# Patient Record
Sex: MALE | Race: WHITE | NOT HISPANIC OR LATINO | Employment: OTHER | URBAN - METROPOLITAN AREA
[De-identification: names, ages, dates, MRNs, and addresses within clinical notes are randomized per-mention and may not be internally consistent; named-entity substitution may affect disease eponyms.]

---

## 2020-07-09 ENCOUNTER — APPOINTMENT (OUTPATIENT)
Dept: LAB | Facility: HOSPITAL | Age: 65
End: 2020-07-09
Attending: PHYSICIAN ASSISTANT
Payer: MEDICARE

## 2020-07-09 ENCOUNTER — HOSPITAL ENCOUNTER (OUTPATIENT)
Dept: RADIOLOGY | Facility: CLINIC | Age: 65
Discharge: HOME | End: 2020-07-09
Attending: PHYSICIAN ASSISTANT
Payer: MEDICARE

## 2020-07-09 ENCOUNTER — OFFICE VISIT (OUTPATIENT)
Dept: FAMILY MEDICINE | Facility: CLINIC | Age: 65
End: 2020-07-09
Payer: MEDICARE

## 2020-07-09 VITALS
HEIGHT: 70 IN | BODY MASS INDEX: 36.8 KG/M2 | TEMPERATURE: 98 F | HEART RATE: 68 BPM | WEIGHT: 257.06 LBS | DIASTOLIC BLOOD PRESSURE: 110 MMHG | SYSTOLIC BLOOD PRESSURE: 176 MMHG

## 2020-07-09 DIAGNOSIS — M79.2 NEURITIS OF UPPER EXTREMITY: ICD-10-CM

## 2020-07-09 DIAGNOSIS — F17.200 TOBACCO USE DISORDER: ICD-10-CM

## 2020-07-09 DIAGNOSIS — Z12.5 SCREENING FOR MALIGNANT NEOPLASM OF PROSTATE: ICD-10-CM

## 2020-07-09 DIAGNOSIS — Z11.59 ENCOUNTER FOR HEPATITIS C SCREENING TEST FOR LOW RISK PATIENT: ICD-10-CM

## 2020-07-09 DIAGNOSIS — J30.2 SEASONAL ALLERGIES: ICD-10-CM

## 2020-07-09 DIAGNOSIS — I10 ESSENTIAL HYPERTENSION: Primary | ICD-10-CM

## 2020-07-09 DIAGNOSIS — R30.0 DYSURIA: ICD-10-CM

## 2020-07-09 LAB
BASOPHILS # BLD AUTO: 0.09 K/UL (ref 0–0.2)
BASOPHILS NFR BLD: 1 % (ref 0–1.9)
BILIRUB SERPL-MCNC: ABNORMAL MG/DL
BLOOD URINE, POC: ABNORMAL
CLARITY, POC UA: ABNORMAL
COLOR, POC UA: ABNORMAL
DIFFERENTIAL METHOD: ABNORMAL
EOSINOPHIL # BLD AUTO: 0.4 K/UL (ref 0–0.5)
EOSINOPHIL NFR BLD: 4.2 % (ref 0–8)
ERYTHROCYTE [DISTWIDTH] IN BLOOD BY AUTOMATED COUNT: 13.7 % (ref 11.5–14.5)
GLUCOSE UR QL STRIP: NORMAL
HCT VFR BLD AUTO: 49.6 % (ref 40–54)
HGB BLD-MCNC: 15.8 G/DL (ref 14–18)
IMM GRANULOCYTES # BLD AUTO: 0.02 K/UL (ref 0–0.04)
IMM GRANULOCYTES NFR BLD AUTO: 0.2 % (ref 0–0.5)
KETONES UR QL STRIP: ABNORMAL
LEUKOCYTE ESTERASE URINE, POC: ABNORMAL
LYMPHOCYTES # BLD AUTO: 3.2 K/UL (ref 1–4.8)
LYMPHOCYTES NFR BLD: 35.3 % (ref 18–48)
MCH RBC QN AUTO: 31.2 PG (ref 27–31)
MCHC RBC AUTO-ENTMCNC: 31.9 G/DL (ref 32–36)
MCV RBC AUTO: 98 FL (ref 82–98)
MONOCYTES # BLD AUTO: 0.8 K/UL (ref 0.3–1)
MONOCYTES NFR BLD: 9.4 % (ref 4–15)
NEUTROPHILS # BLD AUTO: 4.5 K/UL (ref 1.8–7.7)
NEUTROPHILS NFR BLD: 49.9 % (ref 38–73)
NITRITE, POC UA: ABNORMAL
NRBC BLD-RTO: 0 /100 WBC
PH, POC UA: 6
PLATELET # BLD AUTO: 210 K/UL (ref 150–350)
PMV BLD AUTO: 11.8 FL (ref 9.2–12.9)
PROTEIN, POC: ABNORMAL
RBC # BLD AUTO: 5.06 M/UL (ref 4.6–6.2)
SPECIFIC GRAVITY, POC UA: 1.01
UROBILINOGEN, POC UA: 1
WBC # BLD AUTO: 8.97 K/UL (ref 3.9–12.7)

## 2020-07-09 PROCEDURE — 36415 PR COLLECTION VENOUS BLOOD,VENIPUNCTURE: ICD-10-PCS | Mod: S$GLB,,, | Performed by: PHYSICIAN ASSISTANT

## 2020-07-09 PROCEDURE — 36415 COLL VENOUS BLD VENIPUNCTURE: CPT | Mod: S$GLB,,, | Performed by: PHYSICIAN ASSISTANT

## 2020-07-09 PROCEDURE — 72050 X-RAY EXAM NECK SPINE 4/5VWS: CPT | Mod: S$GLB,,, | Performed by: RADIOLOGY

## 2020-07-09 PROCEDURE — 84443 ASSAY THYROID STIM HORMONE: CPT

## 2020-07-09 PROCEDURE — 73130 X-RAY EXAM OF HAND: CPT | Mod: LT,S$GLB,, | Performed by: RADIOLOGY

## 2020-07-09 PROCEDURE — 84439 ASSAY OF FREE THYROXINE: CPT

## 2020-07-09 PROCEDURE — 73130 X-RAY EXAM OF HAND: CPT | Mod: RT,S$GLB,, | Performed by: RADIOLOGY

## 2020-07-09 PROCEDURE — 80061 LIPID PANEL: CPT

## 2020-07-09 PROCEDURE — 99204 OFFICE O/P NEW MOD 45 MIN: CPT | Mod: 25,S$GLB,, | Performed by: PHYSICIAN ASSISTANT

## 2020-07-09 PROCEDURE — 99204 PR OFFICE/OUTPT VISIT, NEW, LEVL IV, 45-59 MIN: ICD-10-PCS | Mod: 25,S$GLB,, | Performed by: PHYSICIAN ASSISTANT

## 2020-07-09 PROCEDURE — 81002 POCT URINE DIPSTICK WITHOUT MICROSCOPE: ICD-10-PCS | Mod: S$GLB,,, | Performed by: PHYSICIAN ASSISTANT

## 2020-07-09 PROCEDURE — 84481 FREE ASSAY (FT-3): CPT

## 2020-07-09 PROCEDURE — 81002 URINALYSIS NONAUTO W/O SCOPE: CPT | Mod: S$GLB,,, | Performed by: PHYSICIAN ASSISTANT

## 2020-07-09 PROCEDURE — 80053 COMPREHEN METABOLIC PANEL: CPT

## 2020-07-09 PROCEDURE — 73130 XR HAND COMPLETE 3 VIEW LEFT: ICD-10-PCS | Mod: LT,S$GLB,, | Performed by: RADIOLOGY

## 2020-07-09 PROCEDURE — 72050 XR CERVICAL SPINE COMPLETE 5 VIEW: ICD-10-PCS | Mod: S$GLB,,, | Performed by: RADIOLOGY

## 2020-07-09 PROCEDURE — 86803 HEPATITIS C AB TEST: CPT

## 2020-07-09 PROCEDURE — 87086 URINE CULTURE/COLONY COUNT: CPT

## 2020-07-09 PROCEDURE — 84153 ASSAY OF PSA TOTAL: CPT

## 2020-07-09 PROCEDURE — 85025 COMPLETE CBC W/AUTO DIFF WBC: CPT

## 2020-07-09 RX ORDER — OLMESARTAN MEDOXOMIL 40 MG/1
40 TABLET ORAL DAILY
Qty: 90 TABLET | Refills: 3 | Status: SHIPPED | OUTPATIENT
Start: 2020-07-09 | End: 2020-09-03

## 2020-07-09 RX ORDER — LEVOCETIRIZINE DIHYDROCHLORIDE 5 MG/1
5 TABLET, FILM COATED ORAL NIGHTLY
Qty: 90 TABLET | Refills: 3 | Status: SHIPPED | OUTPATIENT
Start: 2020-07-09 | End: 2020-09-25

## 2020-07-09 RX ORDER — ASPIRIN 81 MG/1
81 TABLET ORAL DAILY
Status: ON HOLD | COMMUNITY
End: 2023-09-12 | Stop reason: HOSPADM

## 2020-07-09 NOTE — PROGRESS NOTES
Subjective:       Patient ID: Bossman Duke is a 64 y.o. male.    Chief Complaint: Establish Care    Patient is a 65 yo male coming in today to establish care. He has several issues to discuss.     Hypertension  This is a chronic problem. The current episode started more than 1 year ago. The problem has been waxing and waning since onset. The problem is uncontrolled (He has been off medication for over a year. ). Associated symptoms include neck pain. Pertinent negatives include no anxiety, blurred vision, chest pain, headaches, malaise/fatigue, orthopnea, palpitations, peripheral edema, PND, shortness of breath or sweats. There are no associated agents to hypertension. Risk factors for coronary artery disease include family history, obesity, male gender, sedentary lifestyle and smoking/tobacco exposure. Past treatments include ACE inhibitors. Compliance problems include exercise, psychosocial issues and diet.    Neurologic Problem  The patient's primary symptoms include focal sensory loss and focal weakness. Primary symptoms comment: complains of loss of sensation from his bilateral forearms to his hands. The current episode started more than 1 month ago. The neurological problem developed insidiously. The problem has been gradually worsening since onset. There was upper extremity focality noted. Associated symptoms include neck pain. Pertinent negatives include no abdominal pain, chest pain, fatigue, headaches, palpitations or shortness of breath. Treatments tried: neurontin  The treatment provided no relief. (Has a history of neck surgery)   Dysuria   This is a new problem. The current episode started in the past 7 days. The problem has been waxing and waning. Pertinent negatives include no behavior changes, chills, discharge, flank pain, sweats, bubble bath use or constipation. Associated symptoms comments: Complains of a strong urine smell. His past medical history is significant for hypertension. Has a history  of neck surgery   URI   This is a new problem. The current episode started more than 1 month ago. The problem has been waxing and waning. There has been no fever. Associated symptoms include congestion, dysuria, neck pain, sinus pain and sneezing. Pertinent negatives include no abdominal pain, chest pain or headaches. He has tried nothing for the symptoms.     Past Medical History:   Diagnosis Date    Hypertension     PUD (peptic ulcer disease)        Review of Systems   Constitutional: Negative for activity change, chills, fatigue and malaise/fatigue.   HENT: Positive for nasal congestion, postnasal drip, sinus pressure/congestion and sneezing.    Eyes: Negative for blurred vision.   Respiratory: Negative for chest tightness and shortness of breath.    Cardiovascular: Negative for chest pain, palpitations, orthopnea and PND.   Gastrointestinal: Negative for abdominal pain and constipation.   Genitourinary: Positive for dysuria. Negative for flank pain.   Musculoskeletal: Positive for neck pain.   Neurological: Positive for focal weakness. Negative for headaches.         Objective:      Physical Exam  Vitals signs and nursing note reviewed.   Constitutional:       General: He is not in acute distress.     Appearance: Normal appearance. He is obese. He is not ill-appearing, toxic-appearing or diaphoretic.   HENT:      Head: Normocephalic and atraumatic.      Right Ear: Tympanic membrane, ear canal and external ear normal. There is no impacted cerumen.      Left Ear: Tympanic membrane, ear canal and external ear normal. There is no impacted cerumen.      Nose: Congestion present. No rhinorrhea.      Mouth/Throat:      Mouth: Mucous membranes are moist.      Pharynx: No oropharyngeal exudate or posterior oropharyngeal erythema.   Neck:      Musculoskeletal: No muscular tenderness.   Cardiovascular:      Rate and Rhythm: Normal rate and regular rhythm.      Heart sounds: No murmur. No friction rub. No gallop.     Pulmonary:      Effort: Pulmonary effort is normal. No respiratory distress.      Breath sounds: No stridor. No wheezing, rhonchi or rales.   Chest:      Chest wall: No tenderness.   Abdominal:      General: There is no distension.      Palpations: There is no mass.      Tenderness: There is no abdominal tenderness. There is no right CVA tenderness, left CVA tenderness, guarding or rebound.      Hernia: No hernia is present.   Musculoskeletal:         General: No swelling or tenderness.      Right hand: Normal.      Left hand: Normal.   Lymphadenopathy:      Cervical: No cervical adenopathy.   Skin:     General: Skin is warm and dry.   Neurological:      Mental Status: He is alert.         Assessment:       1. Essential hypertension    2. Encounter for hepatitis C screening test for low risk patient    3. Neuritis of upper extremity    4. Screening for malignant neoplasm of prostate    5. Dysuria    6. Seasonal allergies    7. Tobacco use disorder        Plan:       Essential hypertension uncontrolled  -     olmesartan (BENICAR) 40 MG tablet; Take 1 tablet (40 mg total) by mouth once daily.  Dispense: 90 tablet; Refill: 3  -     Comprehensive metabolic panel; Future; Expected date: 07/09/2020  -     Lipid Panel; Future; Expected date: 07/09/2020  -     TSH; Future; Expected date: 07/09/2020  -     T3, free; Future; Expected date: 07/09/2020  -     T4, free; Future; Expected date: 07/09/2020  -     CBC auto differential; Future; Expected date: 07/09/2020  The patient is asked to make an attempt to improve diet and exercise patterns to aid in medical management of this problem.    Encounter for hepatitis C screening test for low risk patient  -     Hepatitis C Antibody; Future; Expected date: 07/09/2020    Neuritis of upper extremity  -     X-Ray Hand 3 view Left; Future; Expected date: 07/09/2020  -     X-Ray Hand 3 view Right; Future; Expected date: 07/09/2020  -     X-Ray Cervical Spine Complete 5 view; Future;  Expected date: 07/09/2020  -     EMG W/ ULTRASOUND AND NERVE CONDUCTION TEST 2 Extremities; Future    Screening for malignant neoplasm of prostate  -     PSA, Screening; Future; Expected date: 07/09/2020    Dysuria  -     Urine culture  -     POCT urine dipstick without microscope    Seasonal allergies  -     levocetirizine (XYZAL) 5 MG tablet; Take 1 tablet (5 mg total) by mouth every evening.  Dispense: 90 tablet; Refill: 3    Tobacco use disorder  Patient counciled on smoking sucessation.

## 2020-07-09 NOTE — PROGRESS NOTES
Venipuncture performed with 23 gauge butterfly, x's 1 attempt,  to R Antecubital vein.  Specimens collected per orders.      Pressure dressing applied to site, instructed patient to remove dressing in 10-15 minutes, OK to re-adjust dressing if pressure causing any discomfort, to observe closely for numbness and/or discoloration to hand or fingers, and to notify provider if bleeding persists after applying constant pressure lasting 30 minutes.

## 2020-07-10 ENCOUNTER — TELEPHONE (OUTPATIENT)
Dept: FAMILY MEDICINE | Facility: CLINIC | Age: 65
End: 2020-07-10

## 2020-07-10 LAB
ALBUMIN SERPL BCP-MCNC: 3.9 G/DL (ref 3.5–5.2)
ALP SERPL-CCNC: 91 U/L (ref 55–135)
ALT SERPL W/O P-5'-P-CCNC: 39 U/L (ref 10–44)
ANION GAP SERPL CALC-SCNC: 9 MMOL/L (ref 8–16)
AST SERPL-CCNC: 29 U/L (ref 10–40)
BACTERIA UR CULT: NO GROWTH
BILIRUB SERPL-MCNC: 0.7 MG/DL (ref 0.1–1)
BUN SERPL-MCNC: 12 MG/DL (ref 8–23)
CALCIUM SERPL-MCNC: 9.7 MG/DL (ref 8.7–10.5)
CHLORIDE SERPL-SCNC: 102 MMOL/L (ref 95–110)
CHOLEST SERPL-MCNC: 217 MG/DL (ref 120–199)
CHOLEST/HDLC SERPL: 3.8 {RATIO} (ref 2–5)
CO2 SERPL-SCNC: 28 MMOL/L (ref 23–29)
COMPLEXED PSA SERPL-MCNC: 2.1 NG/ML (ref 0–4)
CREAT SERPL-MCNC: 0.9 MG/DL (ref 0.5–1.4)
EST. GFR  (AFRICAN AMERICAN): >60 ML/MIN/1.73 M^2
EST. GFR  (NON AFRICAN AMERICAN): >60 ML/MIN/1.73 M^2
GLUCOSE SERPL-MCNC: 93 MG/DL (ref 70–110)
HCV AB SERPL QL IA: NEGATIVE
HDLC SERPL-MCNC: 57 MG/DL (ref 40–75)
HDLC SERPL: 26.3 % (ref 20–50)
LDLC SERPL CALC-MCNC: 132.6 MG/DL (ref 63–159)
NONHDLC SERPL-MCNC: 160 MG/DL
POTASSIUM SERPL-SCNC: 4.5 MMOL/L (ref 3.5–5.1)
PROT SERPL-MCNC: 7.7 G/DL (ref 6–8.4)
SODIUM SERPL-SCNC: 139 MMOL/L (ref 136–145)
T3FREE SERPL-MCNC: 3.6 PG/ML (ref 2.3–4.2)
T4 FREE SERPL-MCNC: 0.79 NG/DL (ref 0.71–1.51)
TRIGL SERPL-MCNC: 137 MG/DL (ref 30–150)
TSH SERPL DL<=0.005 MIU/L-ACNC: 3.17 UIU/ML (ref 0.4–4)

## 2020-07-10 NOTE — TELEPHONE ENCOUNTER
----- Message from Noreen Patino sent at 7/10/2020 12:17 PM CDT -----  Type:  Patient Returning Call    Who Called:  Patient  Who Left Message for Patient:  Cynthia  Does the patient know what this is regarding?:  Urine test results  Best Call Back Number:  [phone]  Additional Information:

## 2020-07-11 ENCOUNTER — TELEPHONE (OUTPATIENT)
Dept: FAMILY MEDICINE | Facility: CLINIC | Age: 65
End: 2020-07-11

## 2020-07-11 NOTE — TELEPHONE ENCOUNTER
----- Message from Red Manuel sent at 7/10/2020  9:05 AM CDT -----  Type:  Patient Returning Call    Who Called:  Patient  Who Left Message for Patient:  Cynthia  Does the patient know what this is regarding?:    Best Call Back Number:  [phone]  Additional Information:

## 2020-07-14 ENCOUNTER — TELEPHONE (OUTPATIENT)
Dept: FAMILY MEDICINE | Facility: CLINIC | Age: 65
End: 2020-07-14

## 2020-07-14 NOTE — TELEPHONE ENCOUNTER
----- Message from Bossman Damon III, PA-C sent at 7/13/2020  7:21 AM CDT -----  Bossman Duke    The lab work shows the cholesterol to be a little elevated. The rest of the blood work is within an acceptable range. I suggest to focus on a heart healthy diet and weight loss. Follow up as scheduled.

## 2020-07-14 NOTE — TELEPHONE ENCOUNTER
Spoke with patient and results reviewed per Bossman Damon III. He verbalized understanding and has no further questions at this time.

## 2020-07-16 ENCOUNTER — TELEPHONE (OUTPATIENT)
Dept: FAMILY MEDICINE | Facility: CLINIC | Age: 65
End: 2020-07-16

## 2020-07-16 NOTE — TELEPHONE ENCOUNTER
----- Message from Sigrid Hernadez sent at 7/15/2020  1:52 PM CDT -----  Contact: pt  Type: Needs Medical Advice    Who Called:  Pt  Best Call Back Number: [phone]  Additional Information: Requesting a call back regarding pt blood pressure  Please Advise ---Thank you

## 2020-07-23 ENCOUNTER — OFFICE VISIT (OUTPATIENT)
Dept: FAMILY MEDICINE | Facility: CLINIC | Age: 65
End: 2020-07-23
Payer: MEDICARE

## 2020-07-23 VITALS
BODY MASS INDEX: 37.25 KG/M2 | OXYGEN SATURATION: 96 % | SYSTOLIC BLOOD PRESSURE: 144 MMHG | WEIGHT: 259.56 LBS | DIASTOLIC BLOOD PRESSURE: 92 MMHG | HEART RATE: 75 BPM | TEMPERATURE: 99 F

## 2020-07-23 DIAGNOSIS — I10 ESSENTIAL HYPERTENSION: Primary | ICD-10-CM

## 2020-07-23 DIAGNOSIS — Z12.11 SCREENING FOR COLON CANCER: ICD-10-CM

## 2020-07-23 PROCEDURE — 99214 OFFICE O/P EST MOD 30 MIN: CPT | Mod: S$GLB,,, | Performed by: PHYSICIAN ASSISTANT

## 2020-07-23 PROCEDURE — 99214 PR OFFICE/OUTPT VISIT, EST, LEVL IV, 30-39 MIN: ICD-10-PCS | Mod: S$GLB,,, | Performed by: PHYSICIAN ASSISTANT

## 2020-07-23 RX ORDER — AMLODIPINE BESYLATE 5 MG/1
5 TABLET ORAL DAILY
Qty: 90 TABLET | Refills: 1 | Status: SHIPPED | OUTPATIENT
Start: 2020-07-23 | End: 2020-09-03

## 2020-07-23 NOTE — PROGRESS NOTES
Subjective:       Patient ID: Bossman Duke is a 64 y.o. male.    Chief Complaint: Follow-up (2 week f/u)    Patient is a 63 yo male here for follow up. He was recently put on Benicar 40 mg. He BP has improved, but not at goal yet. He appears to be tolerating his medication     Hypertension  This is a chronic problem. The current episode started more than 1 year ago. The problem has been gradually improving since onset. The problem is uncontrolled. Pertinent negatives include no anxiety, blurred vision, chest pain, malaise/fatigue, neck pain, orthopnea, palpitations, peripheral edema, PND, shortness of breath or sweats. There are no associated agents to hypertension.     Past Medical History:   Diagnosis Date    Hypertension     PUD (peptic ulcer disease)        Review of Systems   Constitutional: Negative for chills, fatigue and malaise/fatigue.   Eyes: Negative for blurred vision.   Respiratory: Negative for chest tightness and shortness of breath.    Cardiovascular: Negative for chest pain, palpitations, orthopnea and PND.   Gastrointestinal: Negative for abdominal pain.   Musculoskeletal: Negative for neck pain.         Objective:      Physical Exam  Vitals signs reviewed.   Constitutional:       General: He is not in acute distress.     Appearance: He is not ill-appearing, toxic-appearing or diaphoretic.   Cardiovascular:      Rate and Rhythm: Normal rate and regular rhythm.      Heart sounds: No murmur. No friction rub. No gallop.    Pulmonary:      Effort: No respiratory distress.      Breath sounds: No stridor. No wheezing, rhonchi or rales.   Chest:      Chest wall: No tenderness.   Abdominal:      General: Abdomen is flat.      Palpations: Abdomen is soft.      Tenderness: There is no abdominal tenderness.   Neurological:      Mental Status: He is alert.         Assessment:       1. Essential hypertension    2. Screening for colon cancer        Plan:       Essential hypertension  amLODIPine (NORVASC) 5  MG tablet; Take 1 tablet (5 mg total) by mouth once daily.  Dispense: 90 tablet; Refill: 1    Reviewed Care GAPS    Screening for colon cancer  -     Fecal Immunochemical Test (iFOBT); Future; Expected date: 07/23/2020

## 2020-08-06 ENCOUNTER — CLINICAL SUPPORT (OUTPATIENT)
Dept: FAMILY MEDICINE | Facility: CLINIC | Age: 65
End: 2020-08-06
Payer: MEDICARE

## 2020-08-06 VITALS — HEART RATE: 64 BPM | DIASTOLIC BLOOD PRESSURE: 88 MMHG | SYSTOLIC BLOOD PRESSURE: 132 MMHG

## 2020-08-06 NOTE — PROGRESS NOTES
Bossman Hameedette 65 y.o. male is here today for Blood Pressure check.   History of HTN yes.    Review of patient's allergies indicates:  No Known Allergies  Creatinine   Date Value Ref Range Status   07/09/2020 0.9 0.5 - 1.4 mg/dL Final     Sodium   Date Value Ref Range Status   07/09/2020 139 136 - 145 mmol/L Final     Potassium   Date Value Ref Range Status   07/09/2020 4.5 3.5 - 5.1 mmol/L Final   ]  Patient verifies taking blood pressure medications on a regular basis at the same time of the day.     Current Outpatient Medications:     amLODIPine (NORVASC) 5 MG tablet, Take 1 tablet (5 mg total) by mouth once daily., Disp: 90 tablet, Rfl: 1    aspirin (ECOTRIN) 81 MG EC tablet, Take 81 mg by mouth once daily., Disp: , Rfl:     levocetirizine (XYZAL) 5 MG tablet, Take 1 tablet (5 mg total) by mouth every evening., Disp: 90 tablet, Rfl: 3    olmesartan (BENICAR) 40 MG tablet, Take 1 tablet (40 mg total) by mouth once daily., Disp: 90 tablet, Rfl: 3  Does patient have record of home blood pressure readings no. Readings have been averaging N/A.   Last dose of blood pressure medication was taken at 7:30am.  Patient is asymptomatic.   Complains of nothing.    138/88, 68 pulse    Blood pressure reading after 15 minutes was 132/88, Pulse 65.  Bossman Damon PA-C notified.

## 2020-09-02 ENCOUNTER — TELEPHONE (OUTPATIENT)
Dept: FAMILY MEDICINE | Facility: CLINIC | Age: 65
End: 2020-09-02

## 2020-09-02 NOTE — TELEPHONE ENCOUNTER
----- Message from Juani Calderón sent at 9/2/2020 12:11 PM CDT -----  Contact: Patient  Type: Needs Medical Advice  Who Called: patient  Symptoms (please be specific):    How long has patient had these symptoms:    Pharmacy name and phone #:    Best Call Back Number: [phone]  Additional Information: requesting a call back regarding blood pressure medication

## 2020-09-02 NOTE — TELEPHONE ENCOUNTER
Patient called and says he was feeling very weak and dizzy for a couple days. He stopped taking the benicar for 2 days and is feeling much better when not taking it. Asking if there is something else he can try. He asked to come in for a bp check tomorrow. Pt scheduled for nurse visit.

## 2020-09-03 ENCOUNTER — CLINICAL SUPPORT (OUTPATIENT)
Dept: FAMILY MEDICINE | Facility: CLINIC | Age: 65
End: 2020-09-03
Payer: MEDICARE

## 2020-09-03 ENCOUNTER — TELEPHONE (OUTPATIENT)
Dept: FAMILY MEDICINE | Facility: CLINIC | Age: 65
End: 2020-09-03

## 2020-09-03 VITALS — SYSTOLIC BLOOD PRESSURE: 120 MMHG | HEART RATE: 81 BPM | DIASTOLIC BLOOD PRESSURE: 80 MMHG

## 2020-09-03 PROCEDURE — 99499 UNLISTED E&M SERVICE: CPT | Mod: S$GLB,,, | Performed by: PHYSICIAN ASSISTANT

## 2020-09-03 PROCEDURE — 99499 NO LOS: ICD-10-PCS | Mod: S$GLB,,, | Performed by: PHYSICIAN ASSISTANT

## 2020-09-03 RX ORDER — AMLODIPINE BESYLATE 10 MG/1
10 TABLET ORAL DAILY
Qty: 90 TABLET | Refills: 1 | Status: SHIPPED | OUTPATIENT
Start: 2020-09-03 | End: 2020-09-27 | Stop reason: SINTOL

## 2020-09-03 NOTE — TELEPHONE ENCOUNTER
Patient changed his blood pressure medication to Norvasc 10 mg daily and stopped the Benicar due to side effects of dizziness.

## 2020-09-03 NOTE — PROGRESS NOTES
Bossman Leilani 65 y.o. male is here today for Blood Pressure check.   History of HTN yes.    Review of patient's allergies indicates:  No Known Allergies  Creatinine   Date Value Ref Range Status   07/09/2020 0.9 0.5 - 1.4 mg/dL Final     Sodium   Date Value Ref Range Status   07/09/2020 139 136 - 145 mmol/L Final     Potassium   Date Value Ref Range Status   07/09/2020 4.5 3.5 - 5.1 mmol/L Final   ]  Patient verifies taking blood pressure medications on a regular basis at the same time of the day.     Current Outpatient Medications:     amLODIPine (NORVASC) 5 MG tablet, Take 1 tablet (5 mg total) by mouth once daily., Disp: 90 tablet, Rfl: 1    aspirin (ECOTRIN) 81 MG EC tablet, Take 81 mg by mouth once daily., Disp: , Rfl:     levocetirizine (XYZAL) 5 MG tablet, Take 1 tablet (5 mg total) by mouth every evening., Disp: 90 tablet, Rfl: 3    olmesartan (BENICAR) 40 MG tablet, Take 1 tablet (40 mg total) by mouth once daily., Disp: 90 tablet, Rfl: 3 (NOT TAKING)  Does patient have record of home blood pressure readings no. Readings have been averaging N/A.   Last dose of blood pressure medication was taken at 8:30.  Patient is asymptomatic.   Complains of N/a.    BP: 120/80 , Pulse: 81 .    .  KIMBERLY Zhang notified.       Patient was seen today in nurse's visit for a bp check. Patient states he is currently not taking olmesartan 40mg he states his last dose of this medication was 3 days ago. Patient then states he is currently taking Amlodipine 5mg twice daily. Provider notified.

## 2020-09-23 ENCOUNTER — TELEPHONE (OUTPATIENT)
Dept: FAMILY MEDICINE | Facility: CLINIC | Age: 65
End: 2020-09-23

## 2020-09-23 NOTE — TELEPHONE ENCOUNTER
----- Message from Jayson Orourke sent at 9/23/2020  9:41 AM CDT -----  Type: Needs Medical Advice    Who Called:  Patient  Best Call Back Number: [phone]  Additional Information: Patient would like to discuss swelling in feet. Please call to advise. Thanks!

## 2020-09-23 NOTE — TELEPHONE ENCOUNTER
Spoke with patient. States that he has been swelling in his feet and legs and around his abd. States he is also having SOB on exertion. States he started noticed the SOB the beginning of last week. Was changed from Amlodipine 5 BID to 10 once a day at last office visit. States swelling does not resolve upon leg elevation. Offered patient an appt for tomorrow with NP . Patient declined appt stating that he will wait for Bossman on Friday. Advised to call clinic if symptoms worsen.

## 2020-09-24 ENCOUNTER — TELEPHONE (OUTPATIENT)
Dept: FAMILY MEDICINE | Facility: CLINIC | Age: 65
End: 2020-09-24

## 2020-09-24 NOTE — TELEPHONE ENCOUNTER
----- Message from Noreen Patino sent at 9/23/2020  3:43 PM CDT -----  Type: Needs Medical Advice    Who Called:  Patient  Best Call Back Number: [phone]  Additional Information:  Patient needs to speak with nurse concerning issue he is experiencing with medication: amLODIPine (NORVASC) 10 MG tablet/causing swelling in legs and occasional shortness of breath (mostly when exerts himself)please call back to advise.

## 2020-09-25 ENCOUNTER — HOSPITAL ENCOUNTER (OUTPATIENT)
Dept: RADIOLOGY | Facility: CLINIC | Age: 65
Discharge: HOME | End: 2020-09-25
Attending: PHYSICIAN ASSISTANT
Payer: MEDICARE

## 2020-09-25 ENCOUNTER — TELEPHONE (OUTPATIENT)
Dept: FAMILY MEDICINE | Facility: CLINIC | Age: 65
End: 2020-09-25

## 2020-09-25 ENCOUNTER — OFFICE VISIT (OUTPATIENT)
Dept: FAMILY MEDICINE | Facility: CLINIC | Age: 65
End: 2020-09-25
Payer: MEDICARE

## 2020-09-25 VITALS
HEART RATE: 75 BPM | WEIGHT: 275.44 LBS | DIASTOLIC BLOOD PRESSURE: 94 MMHG | BODY MASS INDEX: 39.43 KG/M2 | OXYGEN SATURATION: 97 % | HEIGHT: 70 IN | SYSTOLIC BLOOD PRESSURE: 154 MMHG | TEMPERATURE: 98 F

## 2020-09-25 DIAGNOSIS — I10 ESSENTIAL HYPERTENSION: ICD-10-CM

## 2020-09-25 DIAGNOSIS — R06.02 SHORTNESS OF BREATH: ICD-10-CM

## 2020-09-25 DIAGNOSIS — R06.02 SHORTNESS OF BREATH: Primary | ICD-10-CM

## 2020-09-25 PROCEDURE — 99214 OFFICE O/P EST MOD 30 MIN: CPT | Mod: S$GLB,,, | Performed by: PHYSICIAN ASSISTANT

## 2020-09-25 PROCEDURE — 80053 COMPREHEN METABOLIC PANEL: CPT

## 2020-09-25 PROCEDURE — 85025 COMPLETE CBC W/AUTO DIFF WBC: CPT

## 2020-09-25 PROCEDURE — 99214 PR OFFICE/OUTPT VISIT, EST, LEVL IV, 30-39 MIN: ICD-10-PCS | Mod: S$GLB,,, | Performed by: PHYSICIAN ASSISTANT

## 2020-09-25 PROCEDURE — 83880 ASSAY OF NATRIURETIC PEPTIDE: CPT

## 2020-09-25 PROCEDURE — 71046 XR CHEST PA AND LATERAL: ICD-10-PCS | Mod: S$GLB,,, | Performed by: RADIOLOGY

## 2020-09-25 PROCEDURE — 71046 X-RAY EXAM CHEST 2 VIEWS: CPT | Mod: S$GLB,,, | Performed by: RADIOLOGY

## 2020-09-25 PROCEDURE — 36415 COLL VENOUS BLD VENIPUNCTURE: CPT

## 2020-09-25 RX ORDER — AZITHROMYCIN 250 MG/1
TABLET, FILM COATED ORAL
Qty: 6 TABLET | Refills: 0 | Status: SHIPPED | OUTPATIENT
Start: 2020-09-25 | End: 2020-10-05 | Stop reason: ALTCHOICE

## 2020-09-25 RX ORDER — FUROSEMIDE 40 MG/1
40 TABLET ORAL 2 TIMES DAILY
Qty: 30 TABLET | Refills: 1 | Status: SHIPPED | OUTPATIENT
Start: 2020-09-25 | End: 2020-10-29

## 2020-09-25 NOTE — PATIENT INSTRUCTIONS
STOP AMLODIPINE    START LASIX 40 MG. TAKE 2 TABS TODAY AND TOMORROW. FOLLOW UP WITH ME Sunday. I SUGGEST TO GO TO THE ER OR CALL 911 IF THE SHORTNESS OF BREATH GETS WORSE OR YOU HAVE ANY CHEST PAIN.         Shortness of Breath (Dyspnea)  Shortness of breath is the feeling that you can't catch your breath or get enough air. It is also known as dyspnea.  Dyspnea can be caused by many different conditions. They include:  · Acute asthma attack.  · Worsening of chronic lung diseases such as chronic bronchitis and emphysema.  · Heart failure. This is when weak heart muscle allows extra fluid to collect in the lungs.  · Panic attacks or anxiety. Fear can cause rapid breathing (hyperventilation).  · Pneumonia, or an infection in the lung tissue.  · Exposure to toxic substances, fumes, smoke, or certain medicines.  · Blood clot in the lung (pulmonary embolism). This is often from a piece of blood clot in a deep vein of the leg (deep vein thrombosis) that breaks off and travels to the lungs.  · Heart attack or heart-related chest pain (angina).  · Anemia.  · Collapsed lung (pneumothorax).  · Dehydration.  · Pregnancy.  Based on your visit today, the exact cause of your shortness of breath is not certain. Your tests dont show any of the serious causes of dyspnea. You may need other tests to find out if you have a serious problem. Its important to watch for any new symptoms or symptoms that get worse. Follow up with your healthcare provider as directed.  Home care  Follow these tips to take care of yourself at home:  · When your symptoms are better, go back to your usual activities.  · If you smoke, you should stop. Join a quit-smoking program or ask your healthcare provider for help.  · Eat a healthy diet and get plenty of sleep.  · Get regular exercise. Talk with your healthcare provider before starting to exercise, especially if you have other medical problems.  · Cut down on the amount of caffeine and stimulants you  consume.  Follow-up care  Follow up with your healthcare provider, or as advised.  If tests were done, you will be told if your treatment needs to be changed. You can call as directed for the results.  (Note: If an X-ray was taken, a specialist will review it. You will be notified of any new findings that may affect your care.)  Call 911 or get immediate medical care  Shortness of breath may be a sign of a serious medical problem. For example, it may be a problem with your heart or lungs. Call 911 if you have worsening shortness of breath or trouble breathing, especially with any of the symptoms below:  · You are confused or its difficult to wake you.  · You faint or lose consciousness.  · You have a fast heartbeat, or your heartbeat is irregular.  · You are coughing up blood.  · You have pain in your chest, arm, shoulder, neck, or upper back.  · You break out in a sweat.  When to seek medical advice  Call your healthcare provider right away if any of these occur:  · Slight shortness of breath or wheezing  · Redness, pain or swelling in your leg, arm, or other body area  · Swelling in both legs or ankles  · Fast weight gain  · Dizziness or weakness  · Fever of 100.4ºF (38ºC) or higher, or as directed by your healthcare provider  Date Last Reviewed: 9/13/2015 © 2000-2017 [employer]. [address] [place] [address]. All rights reserved. This information is not intended as a substitute for professional medical care. Always follow your healthcare professional's instructions.

## 2020-09-25 NOTE — TELEPHONE ENCOUNTER
Called patient to see how he was doing and get more information on symptoms. Per MIGUEL ÁNGEL Damon PA-C patient may need to be seen in ER if he is experiencing SOB. Advised patient to call clinic back.

## 2020-09-25 NOTE — PROGRESS NOTES
Subjective:       Patient ID: Bossman Duke is a 65 y.o. male.    Chief Complaint: Shortness of Breath and Swelling (since starting Bp medication )    Shortness of Breath  This is a new problem. The current episode started 1 to 4 weeks ago. The problem occurs constantly. The problem has been gradually worsening. Associated symptoms include leg swelling, PND and sputum production. Pertinent negatives include no abdominal pain, chest pain, claudication, coryza, ear pain, hemoptysis, leg pain, orthopnea, rhinorrhea, syncope or wheezing. The symptoms are aggravated by any activity (Has been on Norvasc 10 mg for HTN). He has tried nothing for the symptoms. His past medical history is significant for allergies.     Past Medical History:   Diagnosis Date    Hypertension     PUD (peptic ulcer disease)        Review of Systems   Constitutional: Positive for activity change. Negative for appetite change.   HENT: Negative for ear pain, postnasal drip and rhinorrhea.    Respiratory: Positive for cough, sputum production and shortness of breath. Negative for apnea, hemoptysis, choking, chest tightness, wheezing and stridor.    Cardiovascular: Positive for leg swelling and PND. Negative for chest pain, palpitations, orthopnea, claudication, syncope and claudication.   Gastrointestinal: Negative for abdominal pain.   Musculoskeletal: Negative for leg pain.         Objective:      Physical Exam  Vitals signs reviewed.   Constitutional:       General: He is not in acute distress.     Appearance: He is obese. He is ill-appearing. He is not toxic-appearing or diaphoretic.   HENT:      Head: Normocephalic and atraumatic.   Cardiovascular:      Rate and Rhythm: Normal rate and regular rhythm.      Pulses: Normal pulses.      Heart sounds: Normal heart sounds. No murmur. No friction rub. No gallop.    Pulmonary:      Effort: No respiratory distress.      Breath sounds: No stridor. Wheezing, rhonchi and rales present.   Chest:       Chest wall: No tenderness.   Abdominal:      General: There is distension.      Tenderness: There is no abdominal tenderness.   Musculoskeletal:      Right lower leg: Edema present.      Left lower leg: Edema present.      Comments: Plus 2 to plus 3 bilateral edema   Neurological:      Mental Status: He is alert.         Assessment:       1. Shortness of breath    2. Essential hypertension        Plan:       Shortness of breath  -     X-Ray Chest PA And Lateral; Future; Expected date: 09/25/2020  No acute finding seen on the chest x-ray    -     IN OFFICE EKG 12-LEAD (to Muse) Findings: NSR  -     Comprehensive metabolic panel; Future; Expected date: 09/25/2020  -     CBC auto differential; Future; Expected date: 09/25/2020  -     Brain Natriuretic Peptide; Future; Expected date: 09/25/2020    Essential hypertension  -     Comprehensive metabolic panel; Future; Expected date: 09/25/2020  -     CBC auto differential; Future; Expected date: 09/25/2020  -     Brain Natriuretic Peptide; Future; Expected date: 09/25/2020    Other orders  -     azithromycin (Z-ADDIS) 250 MG tablet; Follow instructions on pack.  Dispense: 6 tablet; Refill: 0  -     furosemide (LASIX) 40 MG tablet; Take 1 tablet (40 mg total) by mouth 2 (two) times daily.  Dispense: 30 tablet; Refill: 1

## 2020-09-26 LAB
ALBUMIN SERPL BCP-MCNC: 3.5 G/DL (ref 3.5–5.2)
ALP SERPL-CCNC: 109 U/L (ref 55–135)
ALT SERPL W/O P-5'-P-CCNC: 39 U/L (ref 10–44)
ANION GAP SERPL CALC-SCNC: 8 MMOL/L (ref 8–16)
AST SERPL-CCNC: 28 U/L (ref 10–40)
BASOPHILS # BLD AUTO: 0.09 K/UL (ref 0–0.2)
BASOPHILS NFR BLD: 0.8 % (ref 0–1.9)
BILIRUB SERPL-MCNC: 0.5 MG/DL (ref 0.1–1)
BNP SERPL-MCNC: <10 PG/ML (ref 0–99)
BUN SERPL-MCNC: 11 MG/DL (ref 8–23)
CALCIUM SERPL-MCNC: 9.3 MG/DL (ref 8.7–10.5)
CHLORIDE SERPL-SCNC: 103 MMOL/L (ref 95–110)
CO2 SERPL-SCNC: 29 MMOL/L (ref 23–29)
CREAT SERPL-MCNC: 0.8 MG/DL (ref 0.5–1.4)
DIFFERENTIAL METHOD: ABNORMAL
EOSINOPHIL # BLD AUTO: 0.6 K/UL (ref 0–0.5)
EOSINOPHIL NFR BLD: 5.5 % (ref 0–8)
ERYTHROCYTE [DISTWIDTH] IN BLOOD BY AUTOMATED COUNT: 13.9 % (ref 11.5–14.5)
EST. GFR  (AFRICAN AMERICAN): >60 ML/MIN/1.73 M^2
EST. GFR  (NON AFRICAN AMERICAN): >60 ML/MIN/1.73 M^2
GLUCOSE SERPL-MCNC: 111 MG/DL (ref 70–110)
HCT VFR BLD AUTO: 44.4 % (ref 40–54)
HGB BLD-MCNC: 14.1 G/DL (ref 14–18)
IMM GRANULOCYTES # BLD AUTO: 0.04 K/UL (ref 0–0.04)
IMM GRANULOCYTES NFR BLD AUTO: 0.4 % (ref 0–0.5)
LYMPHOCYTES # BLD AUTO: 2.7 K/UL (ref 1–4.8)
LYMPHOCYTES NFR BLD: 24.7 % (ref 18–48)
MCH RBC QN AUTO: 31.2 PG (ref 27–31)
MCHC RBC AUTO-ENTMCNC: 31.8 G/DL (ref 32–36)
MCV RBC AUTO: 98 FL (ref 82–98)
MONOCYTES # BLD AUTO: 1 K/UL (ref 0.3–1)
MONOCYTES NFR BLD: 8.9 % (ref 4–15)
NEUTROPHILS # BLD AUTO: 6.4 K/UL (ref 1.8–7.7)
NEUTROPHILS NFR BLD: 59.7 % (ref 38–73)
NRBC BLD-RTO: 0 /100 WBC
PLATELET # BLD AUTO: 271 K/UL (ref 150–350)
PMV BLD AUTO: 12.7 FL (ref 9.2–12.9)
POTASSIUM SERPL-SCNC: 4.7 MMOL/L (ref 3.5–5.1)
PROT SERPL-MCNC: 7.6 G/DL (ref 6–8.4)
RBC # BLD AUTO: 4.52 M/UL (ref 4.6–6.2)
SODIUM SERPL-SCNC: 140 MMOL/L (ref 136–145)
WBC # BLD AUTO: 10.73 K/UL (ref 3.9–12.7)

## 2020-09-27 ENCOUNTER — OFFICE VISIT (OUTPATIENT)
Dept: FAMILY MEDICINE | Facility: CLINIC | Age: 65
End: 2020-09-27
Payer: MEDICARE

## 2020-09-27 VITALS
SYSTOLIC BLOOD PRESSURE: 154 MMHG | OXYGEN SATURATION: 96 % | BODY MASS INDEX: 38.37 KG/M2 | DIASTOLIC BLOOD PRESSURE: 96 MMHG | WEIGHT: 268 LBS | HEIGHT: 70 IN | HEART RATE: 67 BPM | TEMPERATURE: 99 F

## 2020-09-27 DIAGNOSIS — R60.9 EDEMA, UNSPECIFIED TYPE: Primary | ICD-10-CM

## 2020-09-27 DIAGNOSIS — R07.89 RIGHT-SIDED CHEST WALL PAIN: ICD-10-CM

## 2020-09-27 DIAGNOSIS — I10 ESSENTIAL HYPERTENSION: ICD-10-CM

## 2020-09-27 PROCEDURE — 99214 PR OFFICE/OUTPT VISIT, EST, LEVL IV, 30-39 MIN: ICD-10-PCS | Mod: S$GLB,,, | Performed by: PHYSICIAN ASSISTANT

## 2020-09-27 PROCEDURE — 99214 OFFICE O/P EST MOD 30 MIN: CPT | Mod: S$GLB,,, | Performed by: PHYSICIAN ASSISTANT

## 2020-09-27 RX ORDER — HYDROCODONE BITARTRATE AND ACETAMINOPHEN 7.5; 325 MG/1; MG/1
1 TABLET ORAL EVERY 8 HOURS PRN
Qty: 20 TABLET | Refills: 0 | Status: SHIPPED | OUTPATIENT
Start: 2020-09-27 | End: 2020-10-05

## 2020-09-27 RX ORDER — METOPROLOL SUCCINATE 50 MG/1
50 TABLET, EXTENDED RELEASE ORAL DAILY
Qty: 30 TABLET | Refills: 0 | Status: SHIPPED | OUTPATIENT
Start: 2020-09-27 | End: 2020-10-05

## 2020-09-27 NOTE — PROGRESS NOTES
Subjective:       Patient ID: Bossman Duke is a 65 y.o. male.    Chief Complaint: Follow-up (fluid overload )    Bossman Duke is a 66 yo male who recently saw me for pronounced edema of the lower ext and upper ext. He was put on lasix and is feeling slightly better. His Norvasc 10 mg was also stopped. His cough has improved.     Follow-up  The problem has been gradually improving. Associated symptoms include chest pain. Pertinent negatives include no abdominal pain, coughing, diaphoresis, fatigue or fever. The symptoms are aggravated by bending and exertion.   Chest Pain   This is a new (patent feel of a ladder and injured his righ chest wall) problem. The current episode started in the past 7 days. The onset quality is sudden. The problem occurs constantly. The problem has been waxing and waning. Pain location: right chest wall. The pain is at a severity of 7/10. The pain is moderate. The quality of the pain is described as sharp and stabbing. Associated symptoms include shortness of breath and sputum production. Pertinent negatives include no abdominal pain, back pain, claudication, cough, diaphoresis, exertional chest pressure, fever or irregular heartbeat.   His past medical history is significant for hypertension.     Patient Active Problem List   Diagnosis    Tobacco use disorder    Seasonal allergies    Essential hypertension     Review of Systems   Constitutional: Positive for activity change. Negative for appetite change, diaphoresis, fatigue and fever.   Respiratory: Positive for sputum production and shortness of breath. Negative for cough.    Cardiovascular: Positive for chest pain and leg swelling. Negative for claudication.   Gastrointestinal: Negative for abdominal pain.   Musculoskeletal: Negative for back pain.         Objective:      Physical Exam  Vitals signs reviewed.   Constitutional:       General: He is not in acute distress.     Appearance: Normal appearance. He is obese. He is not  ill-appearing, toxic-appearing or diaphoretic.   Cardiovascular:      Rate and Rhythm: Normal rate and regular rhythm.      Heart sounds: No murmur. No friction rub. No gallop.    Pulmonary:      Effort: Pulmonary effort is normal. No respiratory distress.      Breath sounds: Normal breath sounds. No stridor. No wheezing, rhonchi or rales.   Chest:      Chest wall: Tenderness present.   Neurological:      Mental Status: He is alert.         Lab Results   Component Value Date    WBC 10.73 09/25/2020    HGB 14.1 09/25/2020    HCT 44.4 09/25/2020    MCV 98 09/25/2020     09/25/2020     CMP  Sodium   Date Value Ref Range Status   09/25/2020 140 136 - 145 mmol/L Final     Potassium   Date Value Ref Range Status   09/25/2020 4.7 3.5 - 5.1 mmol/L Final     Chloride   Date Value Ref Range Status   09/25/2020 103 95 - 110 mmol/L Final     CO2   Date Value Ref Range Status   09/25/2020 29 23 - 29 mmol/L Final     Glucose   Date Value Ref Range Status   09/25/2020 111 (H) 70 - 110 mg/dL Final     BUN, Bld   Date Value Ref Range Status   09/25/2020 11 8 - 23 mg/dL Final     Creatinine   Date Value Ref Range Status   09/25/2020 0.8 0.5 - 1.4 mg/dL Final     Calcium   Date Value Ref Range Status   09/25/2020 9.3 8.7 - 10.5 mg/dL Final     Total Protein   Date Value Ref Range Status   09/25/2020 7.6 6.0 - 8.4 g/dL Final     Albumin   Date Value Ref Range Status   09/25/2020 3.5 3.5 - 5.2 g/dL Final     Total Bilirubin   Date Value Ref Range Status   09/25/2020 0.5 0.1 - 1.0 mg/dL Final     Comment:     For infants and newborns, interpretation of results should be based  on gestational age, weight and in agreement with clinical  observations.  Premature Infant recommended reference ranges:  Up to 24 hours.............<8.0 mg/dL  Up to 48 hours............<12.0 mg/dL  3-5 days..................<15.0 mg/dL  6-29 days.................<15.0 mg/dL       Alkaline Phosphatase   Date Value Ref Range Status   09/25/2020 109 55  135  U/L Final     AST   Date Value Ref Range Status   09/25/2020 28 10 - 40 U/L Final     ALT   Date Value Ref Range Status   09/25/2020 39 10 - 44 U/L Final     Anion Gap   Date Value Ref Range Status   09/25/2020 8 8 - 16 mmol/L Final     eGFR if    Date Value Ref Range Status   09/25/2020 >60.0 >60 mL/min/1.73 m^2 Final     eGFR if non    Date Value Ref Range Status   09/25/2020 >60.0 >60 mL/min/1.73 m^2 Final     Comment:     Calculation used to obtain the estimated glomerular filtration  rate (eGFR) is the CKD-EPI equation.        Lab Results   Component Value Date    CHOL 217 (H) 07/09/2020     Lab Results   Component Value Date    HDL 57 07/09/2020     Lab Results   Component Value Date    LDLCALC 132.6 07/09/2020     Lab Results   Component Value Date    TRIG 137 07/09/2020     Lab Results   Component Value Date    CHOLHDL 26.3 07/09/2020     No results found for: LABA1C, HGBA1C     Brain Natriuretic Peptide  Order: [id number]  Status:  Final result   Visible to patient:  No (not released) Next appt:  10/05/2020 at 09:20 AM in Family Medicine (Bossman Damon III, PA-C) Dx:  Shortness of breath; Essential hypert...   Ref Range & Units 2d ago   BNP 0 - 99 pg/mL <10    Comment: Values of less than 100 pg/ml are consistent with non-CHF populations.   Resulting Agency  [facility]         Specimen Collected: 09/25/20 10:11 Last Resulted: 09/26/20 02:22 Lab Flowsheet Order Details View Encounter Lab and Collection Details Routing Result History              X-Ray Chest PA And Lateral  Order: [id number]  Status:  Final result   Visible to patient:  No (not released) Next appt:  10/05/2020 at 09:20 AM in Family Medicine (Bossman Damon III, PA-C) Dx:  Shortness of breath  Details    Reading Physician Reading Date Result Priority   Stuart Constantino MD  [phone] 9/25/2020 STAT      Narrative & Impression     EXAMINATION:  XR CHEST PA AND LATERAL     CLINICAL HISTORY:  Shortness of  breath     TECHNIQUE:  PA and lateral views of the chest were performed.     COMPARISON:  None     FINDINGS:  The heart is not enlarged and the trachea is midline.  There is a linear opacity extending from the right lateral margin of the trachea to the pleural surface superiorly suggesting either fibrotic scar or azygos lobe.  The remainder of the lungs appear clear with no confluent airspace or pleural opacity.  The bony structures are intact.  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     Impression:     No evidence of acute chest disease.        Electronically signed by: Stuart Constantino  Date:                                            09/25/2020  Time:                                           09:50                Assessment:       1. Edema, unspecified type      2 HTN  2) chest wall pain  Plan:       Edema, unspecified type  Improving with lasix.   Will decrease to 40 mg daily for 7 days and follow up with me    2 HTN uncontrolled  -     metoprolol succinate (TOPROL-XL) 50 MG 24 hr tablet; Take 1 tablet (50 mg total) by mouth once daily.  Dispense: 30 tablet; Refill: 0      -For chest wall pain: HYDROcodone-acetaminophen (NORCO) 7.5-325 mg per tablet; Take 1 tablet by mouth every 8 (eight) hours as needed for Pain.  Dispense: 20 tablet; Refill: 0

## 2020-10-05 ENCOUNTER — OFFICE VISIT (OUTPATIENT)
Dept: FAMILY MEDICINE | Facility: CLINIC | Age: 65
End: 2020-10-05
Payer: MEDICARE

## 2020-10-05 VITALS
HEART RATE: 66 BPM | BODY MASS INDEX: 39.39 KG/M2 | WEIGHT: 275.13 LBS | HEIGHT: 70 IN | OXYGEN SATURATION: 96 % | TEMPERATURE: 98 F | SYSTOLIC BLOOD PRESSURE: 136 MMHG | DIASTOLIC BLOOD PRESSURE: 96 MMHG

## 2020-10-05 DIAGNOSIS — R06.00 DYSPNEA, UNSPECIFIED TYPE: Primary | ICD-10-CM

## 2020-10-05 PROCEDURE — 99214 PR OFFICE/OUTPT VISIT, EST, LEVL IV, 30-39 MIN: ICD-10-PCS | Mod: S$GLB,,, | Performed by: PHYSICIAN ASSISTANT

## 2020-10-05 PROCEDURE — 99214 OFFICE O/P EST MOD 30 MIN: CPT | Mod: S$GLB,,, | Performed by: PHYSICIAN ASSISTANT

## 2020-10-05 RX ORDER — METOPROLOL SUCCINATE 50 MG/1
100 TABLET, EXTENDED RELEASE ORAL DAILY
Qty: 30 TABLET | Refills: 1 | Status: SHIPPED | OUTPATIENT
Start: 2020-10-05 | End: 2020-10-29

## 2020-10-05 NOTE — PROGRESS NOTES
Subjective:       Patient ID: Bossman Duke is a 65 y.o. male.    Chief Complaint: Follow-up (BP medication change )    Shortness of Breath  This is a recurrent problem. The current episode started 1 to 4 weeks ago. The problem occurs constantly. The problem has been unchanged. Associated symptoms include leg swelling. Pertinent negatives include no abdominal pain, chest pain, claudication, coryza, ear pain, fever, headaches, hemoptysis, leg pain, neck pain, orthopnea, PND, rash, rhinorrhea, sore throat, sputum production, swollen glands, syncope, vomiting or wheezing. The symptoms are aggravated by any activity. Treatments tried: lasix. The treatment provided mild relief.     Review of Systems   Constitutional: Negative for fever.   HENT: Negative for ear pain, rhinorrhea and sore throat.    Respiratory: Positive for shortness of breath. Negative for hemoptysis, sputum production, chest tightness and wheezing.    Cardiovascular: Positive for leg swelling. Negative for chest pain, orthopnea, claudication, syncope and PND.   Gastrointestinal: Negative for abdominal pain and vomiting.   Musculoskeletal: Negative for leg pain and neck pain.   Integumentary:  Negative for rash.   Neurological: Negative for headaches.         Objective:      Physical Exam  Vitals signs and nursing note reviewed.   Constitutional:       General: He is not in acute distress.     Appearance: He is obese. He is not ill-appearing, toxic-appearing or diaphoretic.   Cardiovascular:      Rate and Rhythm: Normal rate and regular rhythm.      Heart sounds: No murmur. No friction rub. No gallop.    Pulmonary:      Effort: Pulmonary effort is normal. No respiratory distress.      Breath sounds: No stridor. No wheezing, rhonchi or rales.   Chest:      Chest wall: No tenderness.   Abdominal:      General: Abdomen is flat. There is distension.      Palpations: There is no mass.      Tenderness: There is no abdominal tenderness. There is no right CVA  tenderness, left CVA tenderness, guarding or rebound.      Hernia: No hernia is present.   Musculoskeletal:         General: Swelling present.   Neurological:      Mental Status: He is alert.         Assessment:       1. Dyspnea, unspecified type        Plan:       Dyspnea, unspecified type  -     Cancel: Ambulatory referral/consult to Cardiology; Future; Expected date: 10/12/2020  -     Ambulatory referral/consult to Cardiology; Future; Expected date: 10/12/2020    Other orders  -     metoprolol succinate (TOPROL-XL) 50 MG 24 hr tablet; Take 2 tablets (100 mg total) by mouth once daily.  Dispense: 30 tablet; Refill: 1

## 2020-10-23 ENCOUNTER — NURSE TRIAGE (OUTPATIENT)
Dept: ADMINISTRATIVE | Facility: CLINIC | Age: 65
End: 2020-10-23

## 2020-10-23 NOTE — TELEPHONE ENCOUNTER
He needs to be seen today or go the  ER. Also he needs to the Cardiologist like I asked him to last visit.

## 2020-10-23 NOTE — TELEPHONE ENCOUNTER
Called patient, no answer, left voicemail to go to ER if still having trouble breathing or call to make an appt to be seen.

## 2020-10-23 NOTE — TELEPHONE ENCOUNTER
Brother calling for patient initially call was for SOB by front end. Brother reports no sob just new medication Metoprolol causing breathing problems and still elevated BP. BP runs on average 160/100.     Patient denies swelling currently. Patient reports since he slacked off medication breathing improved. BP this morning. Asked patient to give me blood pressure reading and I can hear brother int he background stating that its high and that why it is not recording a reading.    I advised patient I can not schedule a new appointment for him like his brother is requesting because one I hear his BP is high and sounds SOB on the phone. I advised he can refuse the triage but I have to advise he go to the ER which brother stated was not needed earlier in the call. Transferred to the appointment scheduling, but advised patient he should be seen in the ED and that I can not make a sooner appointment for him.     Reason for Disposition   Patient sounds very sick or weak to the triager    Protocols used: DIFFICULT CALLER-A-

## 2020-10-26 ENCOUNTER — TELEPHONE (OUTPATIENT)
Dept: FAMILY MEDICINE | Facility: CLINIC | Age: 65
End: 2020-10-26

## 2020-10-26 NOTE — TELEPHONE ENCOUNTER
----- Message from Kori Landeros, Patient Care Assistant sent at 10/26/2020 12:06 PM CDT -----  Regarding: speak to ailin the nurse  Contact: patient  Type: Needs Medical Advice  Who Called:  patient  Symptoms (please be specific):  na  How long has patient had these symptoms:  na  Pharmacy name and phone #:    [id number] [address] [address]  Phone: [phone] Fax: [phone]      Best Call Back Number: [phone]    Additional Information: patient states he needs some potasium pills prescribed  to him , would like for ailin to call him back . Please call for advise, thanks

## 2020-10-26 NOTE — TELEPHONE ENCOUNTER
Spoke with patient. States he went to the ED due to BP. BP meds changed to lisinopril-hctz. States he was told by the ER Dr he will need a Rx for potassium. Advised patient that HCTZ is a potassium sparing diuretic so it should not cause a decrease in his potassium. Patient insistent that he needs potassium Rx per ER DR. No notes from visit to ED available. Potassium WNL. Please advise

## 2020-10-27 ENCOUNTER — TELEPHONE (OUTPATIENT)
Dept: FAMILY MEDICINE | Facility: CLINIC | Age: 65
End: 2020-10-27

## 2020-10-27 NOTE — TELEPHONE ENCOUNTER
Called patient to verify if he is still taking lasix. No answer, left voicemail to call clinic back.

## 2020-10-27 NOTE — TELEPHONE ENCOUNTER
If he is taking lasix and lisinopril- HCTZ daily, he will likely need potassium. Is he still taking lasix?

## 2020-10-27 NOTE — TELEPHONE ENCOUNTER
----- Message from Carolann Webber sent at 10/27/2020 12:05 PM CDT -----  Regarding: returning call  Contact: patient  Type:  Patient Returning Call    Who Called:  patient  Who Left Message for Patient:  not sure  Does the patient know what this is regarding?:  potassium pills  Best Call Back Number:  [phone] (home)   Additional Information:  Patient needs to clarify why he needs potassium. Please call patient. Thanks!

## 2020-10-29 ENCOUNTER — OFFICE VISIT (OUTPATIENT)
Dept: FAMILY MEDICINE | Facility: CLINIC | Age: 65
End: 2020-10-29
Payer: MEDICARE

## 2020-10-29 VITALS
BODY MASS INDEX: 39.79 KG/M2 | TEMPERATURE: 98 F | WEIGHT: 268.63 LBS | DIASTOLIC BLOOD PRESSURE: 86 MMHG | HEIGHT: 69 IN | HEART RATE: 80 BPM | SYSTOLIC BLOOD PRESSURE: 139 MMHG

## 2020-10-29 DIAGNOSIS — I10 HYPERTENSION, UNSPECIFIED TYPE: Primary | ICD-10-CM

## 2020-10-29 PROCEDURE — 99214 PR OFFICE/OUTPT VISIT, EST, LEVL IV, 30-39 MIN: ICD-10-PCS | Mod: S$GLB,,, | Performed by: PHYSICIAN ASSISTANT

## 2020-10-29 PROCEDURE — 99214 OFFICE O/P EST MOD 30 MIN: CPT | Mod: S$GLB,,, | Performed by: PHYSICIAN ASSISTANT

## 2020-10-29 NOTE — PROGRESS NOTES
Subjective:       Patient ID: Bossman Duke is a 65 y.o. male.    Chief Complaint: No chief complaint on file.    Bossman Duke is an 64 yo male who recently went to [facility] for elevated blood pressure. Cardiac and DVT/PE work up was negative. His Toprol and lasix was stopped. Clonidine and lisinopril/hct was added. He is supposed to see cardiology in an week.      Hypertension  This is a chronic problem. The current episode started more than 1 year ago. The problem has been waxing and waning since onset. The problem is controlled. Pertinent negatives include no anxiety, blurred vision, chest pain, headaches, malaise/fatigue, neck pain, orthopnea, palpitations, peripheral edema, PND, shortness of breath or sweats. There are no associated agents to hypertension. Risk factors for coronary artery disease include family history, obesity and male gender. Past treatments include ACE inhibitors and diuretics. The current treatment provides moderate improvement. Compliance problems include diet and exercise.      Past Medical History:   Diagnosis Date    Hypertension     PUD (peptic ulcer disease)        Review of Systems   Constitutional: Negative for activity change, appetite change and malaise/fatigue.   Eyes: Negative for blurred vision.   Respiratory: Negative for chest tightness and shortness of breath.    Cardiovascular: Negative for chest pain, palpitations, orthopnea, leg swelling and PND.   Gastrointestinal: Negative for abdominal pain.   Musculoskeletal: Negative for neck pain.   Neurological: Negative for headaches.         Objective:      Physical Exam  Vitals signs and nursing note reviewed.   Constitutional:       General: He is not in acute distress.     Appearance: He is not ill-appearing, toxic-appearing or diaphoretic.   Cardiovascular:      Rate and Rhythm: Normal rate and regular rhythm.      Pulses: Normal pulses.      Heart sounds: Normal heart sounds. No murmur. No friction rub. No gallop.     Pulmonary:      Effort: Pulmonary effort is normal. No respiratory distress.      Breath sounds: Normal breath sounds. No stridor. No wheezing, rhonchi or rales.   Chest:      Chest wall: No tenderness.   Abdominal:      General: Abdomen is flat. Bowel sounds are normal. There is no distension.      Palpations: Abdomen is soft. There is no mass.      Tenderness: There is no abdominal tenderness. There is no right CVA tenderness, left CVA tenderness, guarding or rebound.      Hernia: No hernia is present.   Musculoskeletal:         General: No swelling.   Neurological:      Mental Status: He is alert.         Assessment:       1. Hypertension, unspecified type        Plan:       Hypertension, unspecified type       Continue with the current plans, follow up with Cardiology as scheduled, follow up with me as scheduled.

## 2020-11-10 ENCOUNTER — PATIENT MESSAGE (OUTPATIENT)
Dept: ADMINISTRATIVE | Facility: OTHER | Age: 65
End: 2020-11-10

## 2020-11-10 ENCOUNTER — OFFICE VISIT (OUTPATIENT)
Dept: CARDIOLOGY | Facility: CLINIC | Age: 65
End: 2020-11-10
Payer: MEDICARE

## 2020-11-10 ENCOUNTER — TELEPHONE (OUTPATIENT)
Dept: FAMILY MEDICINE | Facility: CLINIC | Age: 65
End: 2020-11-10

## 2020-11-10 VITALS
HEIGHT: 70 IN | HEART RATE: 81 BPM | BODY MASS INDEX: 38.03 KG/M2 | WEIGHT: 265.63 LBS | SYSTOLIC BLOOD PRESSURE: 160 MMHG | DIASTOLIC BLOOD PRESSURE: 104 MMHG

## 2020-11-10 DIAGNOSIS — R06.02 SOB (SHORTNESS OF BREATH): ICD-10-CM

## 2020-11-10 DIAGNOSIS — F17.200 TOBACCO USE DISORDER: ICD-10-CM

## 2020-11-10 DIAGNOSIS — I10 ESSENTIAL HYPERTENSION: Primary | ICD-10-CM

## 2020-11-10 PROCEDURE — 99999 PR PBB SHADOW E&M-EST. PATIENT-LVL III: ICD-10-PCS | Mod: PBBFAC,,, | Performed by: INTERNAL MEDICINE

## 2020-11-10 PROCEDURE — 99204 PR OFFICE/OUTPT VISIT, NEW, LEVL IV, 45-59 MIN: ICD-10-PCS | Mod: S$PBB,,, | Performed by: INTERNAL MEDICINE

## 2020-11-10 PROCEDURE — 99204 OFFICE O/P NEW MOD 45 MIN: CPT | Mod: S$PBB,,, | Performed by: INTERNAL MEDICINE

## 2020-11-10 PROCEDURE — 99213 OFFICE O/P EST LOW 20 MIN: CPT | Mod: PBBFAC,PO | Performed by: INTERNAL MEDICINE

## 2020-11-10 PROCEDURE — 99999 PR PBB SHADOW E&M-EST. PATIENT-LVL III: CPT | Mod: PBBFAC,,, | Performed by: INTERNAL MEDICINE

## 2020-11-10 RX ORDER — LISINOPRIL AND HYDROCHLOROTHIAZIDE 12.5; 2 MG/1; MG/1
1 TABLET ORAL 2 TIMES DAILY
Qty: 180 TABLET | Refills: 3 | Status: SHIPPED | OUTPATIENT
Start: 2020-11-10 | End: 2021-01-26

## 2020-11-10 NOTE — TELEPHONE ENCOUNTER
Patient just saw cardiologist for elevated BP. Can we get him in for a Nursing BP check in 2 weeks?

## 2020-11-10 NOTE — LETTER
November 10, 2020      Bossman Damon III, NGOC  [address] [facility] [address]           [facility]  [address] [place] [address]  Phone: [phone]          Patient: Bossman Duke   MR Number: [id number]   YOB: 1955   Date of Visit: 11/10/2020       Dear Bossman Damon III:    Thank you for referring Bossman Duke to me for evaluation. Attached you will find relevant portions of my assessment and plan of care.    If you have questions, please do not hesitate to call me. I look forward to following Bossman Duke along with you.    Sincerely,    Jl Neil MD    Enclosure  CC:  No Recipients    If you would like to receive this communication electronically, please contact [email or web address] or [phone] to request more information on [employer] access.    For providers and/or their staff who would like to refer a patient to [facility], please contact us through our one-stop-shop provider referral line, [facility], at [phone].    If you feel you have received this communication in error or would no longer like to receive these types of communications, please e-mail [email or web address]

## 2020-11-10 NOTE — PROGRESS NOTES
.  Subjective:    Patient ID:  Bossman Duke is a 65 y.o. male who presents for follow-up of hypertension    HPI  He comes with difficult to control BP, SOB and tiredness for the last few months, no chest pain    Review of Systems   Constitution: Negative for decreased appetite, malaise/fatigue, weight gain and weight loss.   Cardiovascular: Negative for chest pain, dyspnea on exertion, leg swelling, palpitations and syncope.   Respiratory: Negative for cough and shortness of breath.    Gastrointestinal: Negative.    Neurological: Negative for weakness.   All other systems reviewed and are negative.       Objective:      Physical Exam   Constitutional: He is oriented to person, place, and time. He appears well-developed and well-nourished.   HENT:   Head: Normocephalic.   Eyes: Pupils are equal, round, and reactive to light.   Neck: Normal range of motion. Neck supple. No JVD present. Carotid bruit is not present. No thyromegaly present.   Cardiovascular: Normal rate, regular rhythm, normal heart sounds, intact distal pulses and normal pulses. PMI is not displaced. Exam reveals no gallop.   No murmur heard.  Pulmonary/Chest: Effort normal and breath sounds normal.   Abdominal: Soft. Normal appearance. He exhibits no mass. There is no hepatosplenomegaly. There is no abdominal tenderness.   Musculoskeletal: Normal range of motion.         General: No edema.   Neurological: He is alert and oriented to person, place, and time. He has normal strength and normal reflexes. No sensory deficit.   Skin: Skin is warm and intact.   Psychiatric: He has a normal mood and affect.   Nursing note and vitals reviewed.        Assessment:       1. Essential hypertension    2. Tobacco use disorder    3. SOB (shortness of breath)         Plan:   Quit smoking  Increase lisinopril/hct to bid  Regular exercise program  Weight loss  Echo, nuke; call with results  6 m f/u with pilo heredia

## 2020-11-11 ENCOUNTER — PATIENT OUTREACH (OUTPATIENT)
Dept: OTHER | Facility: OTHER | Age: 65
End: 2020-11-11
Payer: MEDICARE

## 2020-11-11 NOTE — PROGRESS NOTES
Digital Medicine: Health  Introduction    Introduced Bossman Duke to Digital Medicine. Discussed health  role and recommended lifestyle modifications.    The history is provided by the patient.           Additional Enrollment Details: Patient is a 65 year old year old male who is newly enrolled in the Digital Medicine Program. Reviewed details of digital coaching and explained automated text messages.    Patient takes medication in the morning and evening     HYPERTENSION  Explained hypertension digital medicine goals including BP goal less than or equal to 130/80mmHg, improved convenience of BP management and reduced risk of heart attack, kidney failure, stroke, eye disease, dementia, and death.      Explained non-pharmacologic therapies like low salt diet and physical activity can reduce blood pressure. .      Explained that we expect patient to submit several blood pressure readings per week at random times of the day, but at least 30 minutes after taking blood pressure medications. Instructed patient not to allow anyone else to use their blood pressure monitor and phone as data submitted is directly entered into medical record. Reviewed and confirmed appropriate blood pressure monitoring technique.         Patient's BP goal is less than or equal to 130/80.Patient's BP average is 160/95 mmHg, which is above goal, per 2017 ACC/AHA Hypertension Guidelines.    Explained to the patient that the Digital Medicine team is not available for emergencies. Advised patient call [facility] ([phone] or [phone]) or [phone] if needed.                 Diet-Not assessed          Physical Activity-Not assessed    Medication Adherence-Medication Adherence not addressed.      Substance, Sleep, Stress-Not assessed      Provided patient education.  Reviewed Device Techniques.     Patient verbalizes understanding.      Explained the importance of self-monitoring and medication adherence. Encouraged the patient to  communicate with their health  for lifestyle modifications to help improve or maintain a healthy lifestyle.        Sent link to [facility] webpages and my contact information via [employer] for future questions.        Explained to the patient that the Digital Medicine team is not available for emergencies. Advised patient call [facility] ([phone] or [phone]) or [phone] if needed.            There are no preventive care reminders to display for this patient.      Last 5 Patient Entered Readings                                      Current 30 Day Average: 160/95     Recent Readings 11/11/2020 11/10/2020    SBP (mmHg) 166 153    DBP (mmHg) 100 90    Pulse 72 74

## 2020-11-11 NOTE — LETTER
November 11, 2020     Bossman Duke  [address] [facility] [place] [address]       Dear Bossman,    Welcome to [facility]! Our goal is to make care effective, proactive and convenient by using data you send us from home to better treat your chronic conditions.                My name is Marsha Lee, and I am your dedicated Digital Medicine clinician. As an expert in medication management, I will help ensure that the medications you are taking continue to provide the intended benefits and help you reach your goals. You can reach me directly at [phone] or by sending me a message directly through your [facility] account.      I am Jovana Kaiser and I will be your health . My job is to help you identify lifestyle changes to improve your disease control. We will talk about nutrition, exercise, and other ways you may be able to adjust your current habits to better your health. Additionally, we will help ensure you are completing the tests and screenings that are necessary to help manage your conditions. You can reach me directly at [phone] or by sending me a message directly through your [facility] account.    Most importantly, YOU are at the center of this team. Together, we will work to improve your overall health and encourage you to meet your goals for a healthier lifestyle.     What we expect from YOU:  · Please take frequent home blood pressure measurements. We ask that you take at least 1 blood pressure reading per week, but more information will better help us get you know you. Be sure you rest for a few minutes before taking the reading in a quiet, comfortable place.     Be available to receive phone calls or [employer] messages, when appropriate, from your care team. Please let us know if there are any specific days or times that work best for us to reach you via phone.     Complete routine tests and screenings. Dont worry, we will help keep you on track!           What you should expect  from your Digital Medicine Care Team:   We will work with you to create a personalized plan of care and provide you with encouragement and education, including regarding lifestyle changes, that could help you manage your disease states.     We will adjust your current medications, if needed, and continue to monitor your long-term progress.     We will provide you and your physician with monthly progress reports after you have been in the program for more than 30 days.     We will send you reminders through [employer] and text messages to help ensure you do not miss any testing deadlines to help manage your disease states.    You will be able to reach us by phone or through your [facility] account by clicking our names under Care Team on the right side of the home screen.    We look forward to working with you to help manage your health,    Sincerely,    Your Digital Medicine Team    Please visit our websites to learn more:   · Hypertension: [email or web address]      Remember, we are not available for emergencies. If you have an emergency, please contact your doctors office directly or call [facility] ([phone] or [phone]) or [phone].

## 2020-11-24 ENCOUNTER — CLINICAL SUPPORT (OUTPATIENT)
Dept: FAMILY MEDICINE | Facility: CLINIC | Age: 65
End: 2020-11-24
Payer: MEDICARE

## 2020-11-24 ENCOUNTER — PATIENT OUTREACH (OUTPATIENT)
Dept: OTHER | Facility: OTHER | Age: 65
End: 2020-11-24

## 2020-11-24 VITALS — DIASTOLIC BLOOD PRESSURE: 78 MMHG | SYSTOLIC BLOOD PRESSURE: 132 MMHG

## 2020-11-24 DIAGNOSIS — I10 HYPERTENSION, UNSPECIFIED TYPE: Primary | ICD-10-CM

## 2020-11-24 PROCEDURE — 99499 UNLISTED E&M SERVICE: CPT | Mod: S$GLB,,, | Performed by: PHYSICIAN ASSISTANT

## 2020-11-24 PROCEDURE — 99499 NO LOS: ICD-10-PCS | Mod: S$GLB,,, | Performed by: PHYSICIAN ASSISTANT

## 2020-11-24 NOTE — PROGRESS NOTES
Digital Medicine: Clinician Introduction    Bossman Duke is a 65 y.o. male who is newly enrolled in the Digital Medicine Clinic.    Patient returned my call to welcome him to the [facility] program.    The history is provided by the patient.      Review of patient's allergies indicates:   -- Benicar (olmesartan) -- Other (See Comments)    --  Gave him severe fatigue and muscle cramps.   -- Metoprolol -- Shortness Of Breath  Completed Medication Reconciliation      HYPERTENSION  Explained hypertension digital medicine goals including BP goal less than or equal to 130/80mmHg, improved convenience of BP management and reduced risk of heart attack, kidney failure, stroke, eye disease, dementia, and death.     Explained non-pharmacologic therapies like low salt diet and physical activity can reduce blood pressure.       Explained that we expect patient to submit several blood pressure readings per week at random times of the day, but at least 30 minutes after taking blood pressure medications. Instructed patient not to allow anyone else to use their blood pressure monitor and phone as data submitted is directly entered into medical record. Reviewed and confirmed appropriate blood pressure monitoring technique.         Reviewed signs/symptoms of hypertension (headache, changes in vision, chest pain, shortness of breath)   Reviewed signs/symptoms of hypotension (lightheaded, dizziness, weakness)     Patient's BP goal is less than or equal to 130/80. Patients BP average is 144/91 mmHg, which is above goal, per 2017 ACC/AHA Hypertension Guidelines. Patient attributes current blood pressure to: Smoking history.   He is currently reducing his cigarette use and is now consuming six cigarettes a day. He plans to continue to reduce his intake on his own. He did not want a referral to our Smoking Cessation Program and knows this is an option at anytime if he feels the need.        Last 5 Patient Entered Readings                                       Current 30 Day Average: 144/91     Recent Readings 11/24/2020 11/24/2020 11/23/2020 11/23/2020 11/23/2020    SBP (mmHg) 148 141 151 155 186    DBP (mmHg) 85 79 90 96 99    Pulse 72 75 74 70 74                Depression Screening  Bossman Duke did not answer the depression screening. He is not in treatment for depression   Denies depression symptoms.    Sleep Apnea Screening  Patient not previously diagnosed with BIB   Denies BIB symptoms. 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     Medication Affordability Screening  Did not address medication affordability screening.     Medication Adherence-Medication adherence was assessed.  Patient continue taking medication as prescribed.            ASSESSMENT(S)  Patients BP average is 144/91 mmHg, which is above goal. Patient's BP goal is less than or equal to 130/80.     Hypertension Plan  Continue current therapy.  Patient wanted to maintain his current HTN medication regimen until he undergoes his cardiovascular work up on 12/9.     Addressed patient questions and patient has my contact information if needed prior to next outreach. Patient verbalizes understanding.      Sent link to [facility] webpages and my contact information via [employer] for future questions.        Explained to the patient that the Digital Medicine team is not available for emergencies. Advised patient call [facility] ([phone] or [phone]) or [phone] if needed.            There are no preventive care reminders to display for this patient.       Current Medication Regimen:  Hypertension Medications                       lisinopriL-hydrochlorothiazide (PRINZIDE,ZESTORETIC) 20-12.5 mg per tablet Take 1 tablet by mouth 2 (two) times a day.

## 2020-11-24 NOTE — PROGRESS NOTES
Bossman Duke 65 y.o. male is here today for Blood Pressure check.   History of HTN yes.    Review of patient's allergies indicates:   Allergen Reactions    Benicar [olmesartan] Other (See Comments)     Gave him severe fatigue and muscle cramps.     Metoprolol Shortness Of Breath     Creatinine   Date Value Ref Range Status   10/23/2020 0.70 0.50 - 1.40 mg/dL Final     Sodium   Date Value Ref Range Status   10/23/2020 136 136 - 145 mmol/L Final     Potassium   Date Value Ref Range Status   10/23/2020 4.2 3.5 - 5.1 mmol/L Final   ]  Patient verifies taking blood pressure medications on a regular basis at the same time of the day.     Current Outpatient Medications:     aspirin (ECOTRIN) 81 MG EC tablet, Take 81 mg by mouth once daily., Disp: , Rfl:     cloNIDine (CATAPRES) 0.1 MG tablet, Take 1 tablet (0.1 mg total) by mouth once daily. Take one tablet po q 1hr for SBP greater than 160 and DBP greater than 90, Disp: 30 tablet, Rfl: 0    Lactobac no.41/Bifidobact no.7 (PROBIOTIC-10 ORAL), Take by mouth., Disp: , Rfl:     lisinopriL-hydrochlorothiazide (PRINZIDE,ZESTORETIC) 20-12.5 mg per tablet, Take 1 tablet by mouth once daily., Disp: 90 tablet, Rfl: 3    lisinopriL-hydrochlorothiazide (PRINZIDE,ZESTORETIC) 20-12.5 mg per tablet, Take 1 tablet by mouth 2 (two) times a day., Disp: 180 tablet, Rfl: 3    mecobalamin (B12 ACTIVE ORAL), Take by mouth., Disp: , Rfl:   Does patient have record of home blood pressure readings yes. Readings have been averaging 160/95 per digital med last note. Home readings inconsistent.   Last dose of blood pressure medication was taken at this morning .  Patient is asymptomatic.   Complains of no symptoms .  States he took his BP meds at 8am.        Blood pressure reading after 15 minutes was 140/80,.Rechecked after 10 minutes, 132/78.    Bossman Damon notified.

## 2020-11-24 NOTE — PROGRESS NOTES
DANIELB to welcome him to the [facility] program and discuss his increase in lisinopril/HCTZ to BID by his PCP on 11/10

## 2020-12-09 ENCOUNTER — CLINICAL SUPPORT (OUTPATIENT)
Dept: CARDIOLOGY | Facility: CLINIC | Age: 65
End: 2020-12-09
Attending: INTERNAL MEDICINE
Payer: MEDICARE

## 2020-12-09 ENCOUNTER — HOSPITAL ENCOUNTER (OUTPATIENT)
Dept: RADIOLOGY | Facility: HOSPITAL | Age: 65
Discharge: HOME | End: 2020-12-09
Attending: INTERNAL MEDICINE
Payer: MEDICARE

## 2020-12-09 VITALS — BODY MASS INDEX: 37.94 KG/M2 | WEIGHT: 265 LBS | HEIGHT: 70 IN

## 2020-12-09 VITALS — WEIGHT: 265 LBS | BODY MASS INDEX: 37.94 KG/M2 | HEIGHT: 70 IN

## 2020-12-09 DIAGNOSIS — R06.02 SOB (SHORTNESS OF BREATH): ICD-10-CM

## 2020-12-09 DIAGNOSIS — F17.200 TOBACCO USE DISORDER: ICD-10-CM

## 2020-12-09 DIAGNOSIS — I10 ESSENTIAL HYPERTENSION: ICD-10-CM

## 2020-12-09 LAB
CV STRESS BASE HR: 72 BPM
DIASTOLIC BLOOD PRESSURE: 82 MMHG
NUC REST EJECTION FRACTION: 60
OHS CV CPX 1 MINUTE RECOVERY HEART RATE: 96 BPM
OHS CV CPX 85 PERCENT MAX PREDICTED HEART RATE MALE: 132
OHS CV CPX MAX PREDICTED HEART RATE: 155
OHS CV CPX PATIENT IS FEMALE: 0
OHS CV CPX PATIENT IS MALE: 1
OHS CV CPX PEAK DIASTOLIC BLOOD PRESSURE: 88 MMHG
OHS CV CPX PEAK HEAR RATE: 106 BPM
OHS CV CPX PEAK RATE PRESSURE PRODUCT: NORMAL
OHS CV CPX PEAK SYSTOLIC BLOOD PRESSURE: 162 MMHG
OHS CV CPX PERCENT MAX PREDICTED HEART RATE ACHIEVED: 68
OHS CV CPX RATE PRESSURE PRODUCT PRESENTING: 9720
SYSTOLIC BLOOD PRESSURE: 135 MMHG

## 2020-12-09 PROCEDURE — 99211 OFF/OP EST MAY X REQ PHY/QHP: CPT | Mod: PBBFAC,25,PO

## 2020-12-09 PROCEDURE — 99211 OFF/OP EST MAY X REQ PHY/QHP: CPT | Mod: PBBFAC,25,27,PO

## 2020-12-09 PROCEDURE — 78452 HT MUSCLE IMAGE SPECT MULT: CPT | Mod: 26,,, | Performed by: INTERNAL MEDICINE

## 2020-12-09 PROCEDURE — 93016 CV STRESS TEST SUPVJ ONLY: CPT | Mod: ,,, | Performed by: INTERNAL MEDICINE

## 2020-12-09 PROCEDURE — 99999 PR PBB SHADOW E&M-EST. PATIENT-LVL I: ICD-10-PCS | Mod: PBBFAC,,,

## 2020-12-09 PROCEDURE — 99999 PR PBB SHADOW E&M-EST. PATIENT-LVL I: CPT | Mod: PBBFAC,,,

## 2020-12-09 PROCEDURE — 93306 TTE W/DOPPLER COMPLETE: CPT | Mod: PBBFAC,PO | Performed by: INTERNAL MEDICINE

## 2020-12-09 PROCEDURE — 93018 CV STRESS TEST I&R ONLY: CPT | Mod: ,,, | Performed by: INTERNAL MEDICINE

## 2020-12-09 PROCEDURE — 78452 STRESS TEST WITH MYOCARDIAL PERFUSION (CUPID ONLY): ICD-10-PCS | Mod: 26,,, | Performed by: INTERNAL MEDICINE

## 2020-12-09 PROCEDURE — 93306 ECHO (CUPID ONLY): ICD-10-PCS | Mod: 26,S$PBB,, | Performed by: INTERNAL MEDICINE

## 2020-12-09 PROCEDURE — 93017 CV STRESS TEST TRACING ONLY: CPT | Mod: PO

## 2020-12-09 PROCEDURE — 93018 PR CARDIAC STRESS TST,INTERP/REPT ONLY: ICD-10-PCS | Mod: ,,, | Performed by: INTERNAL MEDICINE

## 2020-12-09 PROCEDURE — 93016 STRESS TEST WITH MYOCARDIAL PERFUSION (CUPID ONLY): ICD-10-PCS | Mod: ,,, | Performed by: INTERNAL MEDICINE

## 2020-12-10 ENCOUNTER — PATIENT OUTREACH (OUTPATIENT)
Dept: OTHER | Facility: OTHER | Age: 65
End: 2020-12-10

## 2020-12-10 DIAGNOSIS — I10 ESSENTIAL HYPERTENSION: Primary | ICD-10-CM

## 2020-12-11 LAB
ASCENDING AORTA: 2.98 CM
AV INDEX (PROSTH): 0.74
AV MEAN GRADIENT: 4 MMHG
AV PEAK GRADIENT: 7 MMHG
AV VALVE AREA: 3.27 CM2
AV VELOCITY RATIO: 0.69
BSA FOR ECHO PROCEDURE: 2.44 M2
CV ECHO LV RWT: 0.48 CM
DOP CALC AO PEAK VEL: 1.36 M/S
DOP CALC AO VTI: 26.64 CM
DOP CALC LVOT AREA: 4.4 CM2
DOP CALC LVOT DIAMETER: 2.38 CM
DOP CALC LVOT PEAK VEL: 0.94 M/S
DOP CALC LVOT STROKE VOLUME: 87.24 CM3
DOP CALCLVOT PEAK VEL VTI: 19.62 CM
E WAVE DECELERATION TIME: 185.55 MSEC
E/A RATIO: 0.88
E/E' RATIO: 8.15 M/S
ECHO LV POSTERIOR WALL: 1.04 CM (ref 0.6–1.1)
FRACTIONAL SHORTENING: 38 % (ref 28–44)
INTERVENTRICULAR SEPTUM: 1.09 CM (ref 0.6–1.1)
IVRT: 125.59 MSEC
LA MAJOR: 4.6 CM
LA MINOR: 4.2 CM
LA WIDTH: 2.78 CM
LEFT ATRIUM SIZE: 2.81 CM
LEFT ATRIUM VOLUME INDEX: 12.4 ML/M2
LEFT ATRIUM VOLUME: 29.16 CM3
LEFT INTERNAL DIMENSION IN SYSTOLE: 2.7 CM (ref 2.1–4)
LEFT VENTRICLE DIASTOLIC VOLUME INDEX: 35.83 ML/M2
LEFT VENTRICLE DIASTOLIC VOLUME: 84.29 ML
LEFT VENTRICLE MASS INDEX: 67 G/M2
LEFT VENTRICLE SYSTOLIC VOLUME INDEX: 11.5 ML/M2
LEFT VENTRICLE SYSTOLIC VOLUME: 27.05 ML
LEFT VENTRICULAR INTERNAL DIMENSION IN DIASTOLE: 4.33 CM (ref 3.5–6)
LEFT VENTRICULAR MASS: 157.35 G
LV LATERAL E/E' RATIO: 7.57 M/S
LV SEPTAL E/E' RATIO: 8.83 M/S
MV A" WAVE DURATION": 9.51 MSEC
MV PEAK A VEL: 0.6 M/S
MV PEAK E VEL: 0.53 M/S
PISA TR MAX VEL: 2.05 M/S
PULM VEIN S/D RATIO: 1.5
PV PEAK D VEL: 0.44 M/S
PV PEAK S VEL: 0.66 M/S
RA MAJOR: 4.44 CM
RA PRESSURE: 3 MMHG
RA WIDTH: 3.06 CM
RIGHT VENTRICULAR END-DIASTOLIC DIMENSION: 3.64 CM
RV TISSUE DOPPLER FREE WALL SYSTOLIC VELOCITY 1 (APICAL 4 CHAMBER VIEW): 14.03 CM/S
SINUS: 3.34 CM
STJ: 2.82 CM
TDI LATERAL: 0.07 M/S
TDI SEPTAL: 0.06 M/S
TDI: 0.07 M/S
TR MAX PG: 17 MMHG
TRICUSPID ANNULAR PLANE SYSTOLIC EXCURSION: 2.74 CM
TV REST PULMONARY ARTERY PRESSURE: 20 MMHG

## 2020-12-11 RX ORDER — NIFEDIPINE 30 MG/1
30 TABLET, EXTENDED RELEASE ORAL NIGHTLY
Qty: 30 TABLET | Refills: 5 | Status: SHIPPED | OUTPATIENT
Start: 2020-12-11 | End: 2021-01-07 | Stop reason: SDUPTHER

## 2020-12-11 NOTE — PROGRESS NOTES
Digital Medicine: Clinician Follow-Up    Patient called to discuss his cardiovascular work-up the past two days and discuss adding nifedipine.    The history is provided by the patient.      Review of patient's allergies indicates:   -- Benicar (olmesartan) -- Other (See Comments)    --  Gave him severe fatigue and muscle cramps.   -- Metoprolol -- Shortness Of Breath  Follow-up reason(s): routine follow up.     Hypertension    Patient's blood pressure is stable.   Patient is not experiencing signs/symptoms of hypotension.  Patient is not experiencing signs/symptoms of hypertension.      Additional Follow-up details: He is working to reduce his cigarette intake. His goal is to complete cessation.     His heart work-up came back normal. He is ready to make further HTN medication changes.    He has a stationary bike that he plans to use three times a week for 15 minutes.          Last 5 Patient Entered Readings                                      Current 30 Day Average: 139/90     Recent Readings 12/8/2020 12/8/2020 12/7/2020 12/6/2020 12/6/2020    SBP (mmHg) 146 146 139 148 153    DBP (mmHg) 91 92 88 113 100    Pulse 70 71 75 77 83                 Depression Screening  Did not address depression screening.    Sleep Apnea Screening    Did not address sleep apnea screening.     Medication Affordability Screening  Did not address medication affordability screening.     Medication Adherence-Medication adherence was assessed.          ASSESSMENT(S)  Patients BP average is 139/90 mmHg, which is above goal. Patient's BP goal is less than or equal to 130/80.     He starting using the proper BP technique.      Hypertension Plan  Hypertension Medication Change. Add nifedipine 30mg nightly.   Provided patient education.       Addressed patient questions and patient has my contact information if needed prior to next outreach. Patient verbalizes understanding.             There are no preventive care reminders to display for  this patient.  There are no preventive care reminders to display for this patient.      Hypertension Medications             lisinopriL-hydrochlorothiazide (PRINZIDE,ZESTORETIC) 20-12.5 mg per tablet Take 1 tablet by mouth 2 (two) times a day.

## 2020-12-22 ENCOUNTER — PATIENT OUTREACH (OUTPATIENT)
Dept: OTHER | Facility: OTHER | Age: 65
End: 2020-12-22

## 2020-12-22 ENCOUNTER — OFFICE VISIT (OUTPATIENT)
Dept: FAMILY MEDICINE | Facility: CLINIC | Age: 65
End: 2020-12-22
Payer: MEDICARE

## 2020-12-22 ENCOUNTER — HOSPITAL ENCOUNTER (OUTPATIENT)
Dept: RADIOLOGY | Facility: CLINIC | Age: 65
Discharge: HOME | End: 2020-12-22
Attending: PHYSICIAN ASSISTANT
Payer: MEDICARE

## 2020-12-22 VITALS
BODY MASS INDEX: 39.29 KG/M2 | TEMPERATURE: 98 F | HEART RATE: 90 BPM | DIASTOLIC BLOOD PRESSURE: 74 MMHG | SYSTOLIC BLOOD PRESSURE: 138 MMHG | WEIGHT: 273.81 LBS | OXYGEN SATURATION: 95 %

## 2020-12-22 DIAGNOSIS — M79.641 HAND PAIN, RIGHT: Primary | ICD-10-CM

## 2020-12-22 DIAGNOSIS — Z13.6 SCREENING FOR AAA (ABDOMINAL AORTIC ANEURYSM): ICD-10-CM

## 2020-12-22 DIAGNOSIS — M79.641 HAND PAIN, RIGHT: ICD-10-CM

## 2020-12-22 PROCEDURE — 99214 PR OFFICE/OUTPT VISIT, EST, LEVL IV, 30-39 MIN: ICD-10-PCS | Mod: S$GLB,,, | Performed by: PHYSICIAN ASSISTANT

## 2020-12-22 PROCEDURE — 73130 XR HAND COMPLETE 3 VIEW RIGHT: ICD-10-PCS | Mod: RT,S$GLB,, | Performed by: RADIOLOGY

## 2020-12-22 PROCEDURE — 73130 X-RAY EXAM OF HAND: CPT | Mod: RT,S$GLB,, | Performed by: RADIOLOGY

## 2020-12-22 PROCEDURE — 99214 OFFICE O/P EST MOD 30 MIN: CPT | Mod: S$GLB,,, | Performed by: PHYSICIAN ASSISTANT

## 2020-12-22 RX ORDER — MELOXICAM 15 MG/1
15 TABLET ORAL DAILY
Qty: 30 TABLET | Refills: 0 | Status: SHIPPED | OUTPATIENT
Start: 2020-12-22 | End: 2021-01-21

## 2020-12-22 NOTE — PROGRESS NOTES
Subjective:       Patient ID: Bossman Duke is a 65 y.o. male.    Chief Complaint: Wrist Pain (right wrist pain )    Hand Pain   The incident occurred more than 1 week ago. There was no injury mechanism. The pain is present in the right hand and right wrist. The quality of the pain is described as aching and shooting. The pain radiates to the right arm. The pain is moderate. The pain has been fluctuating since the incident. Associated symptoms include muscle weakness, numbness and tingling. Pertinent negatives include no chest pain. The symptoms are aggravated by movement, lifting and palpation. He has tried immobilization for the symptoms. The treatment provided mild relief.     Past Medical History:   Diagnosis Date    Hypertension     PUD (peptic ulcer disease)      Review of Systems   Constitutional: Negative for activity change and appetite change.   Respiratory: Negative for chest tightness and shortness of breath.    Cardiovascular: Negative for chest pain.   Gastrointestinal: Negative for abdominal pain.   Neurological: Positive for tingling and numbness.         Objective:      Physical Exam  Vitals signs reviewed.   Constitutional:       General: He is not in acute distress.     Appearance: Normal appearance. He is obese. He is not ill-appearing, toxic-appearing or diaphoretic.   Cardiovascular:      Rate and Rhythm: Normal rate and regular rhythm.      Heart sounds: No murmur. No friction rub. No gallop.    Pulmonary:      Effort: Pulmonary effort is normal. No respiratory distress.      Breath sounds: Normal breath sounds. No stridor. No wheezing, rhonchi or rales.   Chest:      Chest wall: No tenderness.   Musculoskeletal:      Right wrist: He exhibits decreased range of motion and tenderness.      Right hand: He exhibits tenderness.        Hands:    Neurological:      Mental Status: He is alert.         Assessment:       1. Hand pain, right    2. Screening for AAA (abdominal aortic aneurysm)         Plan:       Hand pain, right  -     X-Ray Hand 3 view Right; Future; Expected date: 12/22/2020    Screening for AAA (abdominal aortic aneurysm)  -     US Abdominal Aorta; Future; Expected date: 12/22/2020    Other orders  -     meloxicam (MOBIC) 15 MG tablet; Take 1 tablet (15 mg total) by mouth once daily.  Dispense: 30 tablet; Refill: 0

## 2020-12-22 NOTE — PATIENT INSTRUCTIONS
Acute Pain, Uncertain Cause  Pain can be caused by many conditions that range from very minor to very serious. In some cases, though, pain comes and goes with no apparent cause.  We were not able to find the exact cause for your pain. At this time there is no sign of any serious illness causing your pain. More tests may be needed to determine the cause. In many cases, pain like this goes away by itself.  Home care  Take any medicines as prescribed. If another medicine was not prescribed for pain, you can take an over-the-counter pain medicine such as ibuprofen or acetaminophen. Use these as directed on the label.    Follow-up care  Follow up with your healthcare provider or our staff as directed.  When to seek medical advice  Call your healthcare provider for any of the following:  · Pain changes in pattern  · Pain doesn't lessen or gets worse  · New symptoms appear  · Fever of 100.4ºF (38ºC) or higher, or as directed by your healthcare provider  Date Last Reviewed: 7/26/2015  © 5074-8475 [employer]. [address] [place] [address], [place] [address]. All rights reserved. This information is not intended as a substitute for professional medical care. Always follow your healthcare professional's instructions.

## 2020-12-23 NOTE — PROGRESS NOTES
Digital Medicine: Clinician Follow-Up    The history is provided by the patient.      Review of patient's allergies indicates:   -- Benicar (olmesartan) -- Other (See Comments)    --  Gave him severe fatigue and muscle cramps.   -- Metoprolol -- Shortness Of Breath  Follow-up reason(s): addressing patient questions/concerns.     Hypertension    Patient's blood pressure readings are labile.   Patient is not experiencing signs/symptoms of hypotension.  Patient is not experiencing signs/symptoms of hypertension.      Additional Follow-up details: Patient stated he is having trouble breathing and more worn out that he is experiencing since starting nifedipine a couple weeks ago. He denies any other symptoms such as chest pain or SOB. He is having arthritis pain and is starting Mobic PRN.     He has an abdominal ultrasound scheduled next week.          Last 5 Patient Entered Readings                                      Current 30 Day Average: 136/85     Recent Readings 12/23/2020 12/22/2020 12/21/2020 12/21/2020 12/21/2020    SBP (mmHg) 161 135 145 143 161    DBP (mmHg) 67 77 74 82 104    Pulse 81 84 95 92 94                 Depression Screening  Did not address depression screening.    Sleep Apnea Screening    Did not address sleep apnea screening.     Medication Affordability Screening  Did not address medication affordability screening.     Medication Adherence-Medication adherence was assessed.          ASSESSMENT(S)  Patients BP average is 136/85 mmHg, which is above goal. Patient's BP goal is less than or equal to 130/80.     Hypertension Plan  Additional monitoring needed. Will anticipate a higher BP since he is starting Mobic.  Continue current therapy.  Provided patient education. Keep me informed on any other symptoms.       Addressed patient questions and patient has my contact information if needed prior to next outreach. Patient verbalizes understanding.             There are no preventive care reminders  to display for this patient.  There are no preventive care reminders to display for this patient.      Hypertension Medications             lisinopriL-hydrochlorothiazide (PRINZIDE,ZESTORETIC) 20-12.5 mg per tablet Take 1 tablet by mouth 2 (two) times a day.    NIFEdipine (PROCARDIA-XL) 30 MG (OSM) 24 hr tablet Take 1 tablet (30 mg total) by mouth every evening.

## 2020-12-29 ENCOUNTER — HOSPITAL ENCOUNTER (OUTPATIENT)
Dept: RADIOLOGY | Facility: HOSPITAL | Age: 65
Discharge: HOME | End: 2020-12-29
Attending: PHYSICIAN ASSISTANT
Payer: MEDICARE

## 2020-12-29 DIAGNOSIS — Z13.6 SCREENING FOR AAA (ABDOMINAL AORTIC ANEURYSM): ICD-10-CM

## 2020-12-29 PROCEDURE — 76775 US ABDOMINAL AORTA: ICD-10-PCS | Mod: 26,,, | Performed by: RADIOLOGY

## 2020-12-29 PROCEDURE — 76775 US EXAM ABDO BACK WALL LIM: CPT | Mod: TC,PO

## 2020-12-29 PROCEDURE — 76775 US EXAM ABDO BACK WALL LIM: CPT | Mod: 26,,, | Performed by: RADIOLOGY

## 2020-12-30 ENCOUNTER — TELEPHONE (OUTPATIENT)
Dept: FAMILY MEDICINE | Facility: CLINIC | Age: 65
End: 2020-12-30

## 2020-12-30 NOTE — TELEPHONE ENCOUNTER
----- Message from Red Manuel sent at 12/30/2020 10:03 AM CST -----  Type: Needs Medical Advice  Who Called:  Patient    Best Call Back Number: [phone]  Additional Information: Pt states that he would like a callback regarding his test results

## 2021-01-04 ENCOUNTER — TELEPHONE (OUTPATIENT)
Dept: FAMILY MEDICINE | Facility: CLINIC | Age: 66
End: 2021-01-04

## 2021-01-04 DIAGNOSIS — M25.50 ARTHRALGIA, UNSPECIFIED JOINT: Primary | ICD-10-CM

## 2021-01-21 ENCOUNTER — OFFICE VISIT (OUTPATIENT)
Dept: FAMILY MEDICINE | Facility: CLINIC | Age: 66
End: 2021-01-21
Payer: MEDICARE

## 2021-01-21 VITALS
TEMPERATURE: 98 F | DIASTOLIC BLOOD PRESSURE: 76 MMHG | WEIGHT: 273.25 LBS | HEART RATE: 64 BPM | BODY MASS INDEX: 39.12 KG/M2 | HEIGHT: 70 IN | SYSTOLIC BLOOD PRESSURE: 120 MMHG

## 2021-01-21 DIAGNOSIS — J44.1 COPD EXACERBATION: ICD-10-CM

## 2021-01-21 DIAGNOSIS — I73.9 CLAUDICATION: ICD-10-CM

## 2021-01-21 DIAGNOSIS — M54.32 BILATERAL SCIATICA: ICD-10-CM

## 2021-01-21 DIAGNOSIS — M54.31 BILATERAL SCIATICA: ICD-10-CM

## 2021-01-21 DIAGNOSIS — M50.90 CERVICAL NECK PAIN WITH EVIDENCE OF DISC DISEASE: Primary | ICD-10-CM

## 2021-01-21 PROCEDURE — 36415 COLL VENOUS BLD VENIPUNCTURE: CPT | Mod: S$GLB,,, | Performed by: PHYSICIAN ASSISTANT

## 2021-01-21 PROCEDURE — 36415 PR COLLECTION VENOUS BLOOD,VENIPUNCTURE: ICD-10-PCS | Mod: S$GLB,,, | Performed by: PHYSICIAN ASSISTANT

## 2021-01-21 PROCEDURE — 99214 PR OFFICE/OUTPT VISIT, EST, LEVL IV, 30-39 MIN: ICD-10-PCS | Mod: S$GLB,,, | Performed by: PHYSICIAN ASSISTANT

## 2021-01-21 PROCEDURE — 80053 COMPREHEN METABOLIC PANEL: CPT

## 2021-01-21 PROCEDURE — 83735 ASSAY OF MAGNESIUM: CPT

## 2021-01-21 PROCEDURE — 99214 OFFICE O/P EST MOD 30 MIN: CPT | Mod: S$GLB,,, | Performed by: PHYSICIAN ASSISTANT

## 2021-01-22 LAB
ALBUMIN SERPL BCP-MCNC: 3.9 G/DL (ref 3.5–5.2)
ALP SERPL-CCNC: 79 U/L (ref 55–135)
ALT SERPL W/O P-5'-P-CCNC: 43 U/L (ref 10–44)
ANION GAP SERPL CALC-SCNC: 10 MMOL/L (ref 8–16)
AST SERPL-CCNC: 29 U/L (ref 10–40)
BILIRUB SERPL-MCNC: 0.4 MG/DL (ref 0.1–1)
BUN SERPL-MCNC: 14 MG/DL (ref 8–23)
CALCIUM SERPL-MCNC: 9.5 MG/DL (ref 8.7–10.5)
CHLORIDE SERPL-SCNC: 99 MMOL/L (ref 95–110)
CO2 SERPL-SCNC: 24 MMOL/L (ref 23–29)
CREAT SERPL-MCNC: 0.8 MG/DL (ref 0.5–1.4)
EST. GFR  (AFRICAN AMERICAN): >60 ML/MIN/1.73 M^2
EST. GFR  (NON AFRICAN AMERICAN): >60 ML/MIN/1.73 M^2
GLUCOSE SERPL-MCNC: 103 MG/DL (ref 70–110)
MAGNESIUM SERPL-MCNC: 2.1 MG/DL (ref 1.6–2.6)
POTASSIUM SERPL-SCNC: 4.3 MMOL/L (ref 3.5–5.1)
PROT SERPL-MCNC: 7.6 G/DL (ref 6–8.4)
SODIUM SERPL-SCNC: 133 MMOL/L (ref 136–145)

## 2021-01-26 ENCOUNTER — HOSPITAL ENCOUNTER (OUTPATIENT)
Dept: RADIOLOGY | Facility: HOSPITAL | Age: 66
Discharge: HOME | End: 2021-01-26
Attending: NURSE PRACTITIONER
Payer: MEDICARE

## 2021-01-26 ENCOUNTER — HOSPITAL ENCOUNTER (OUTPATIENT)
Dept: RADIOLOGY | Facility: HOSPITAL | Age: 66
Discharge: HOME | End: 2021-01-26
Attending: PHYSICIAN ASSISTANT
Payer: MEDICARE

## 2021-01-26 DIAGNOSIS — J90 BILATERAL PLEURAL EFFUSION: ICD-10-CM

## 2021-01-26 DIAGNOSIS — J44.1 COPD EXACERBATION: ICD-10-CM

## 2021-01-26 DIAGNOSIS — I73.9 CLAUDICATION: ICD-10-CM

## 2021-01-26 DIAGNOSIS — R60.0 BILATERAL LEG EDEMA: ICD-10-CM

## 2021-01-26 DIAGNOSIS — R79.89 ELEVATED D-DIMER: ICD-10-CM

## 2021-01-26 PROCEDURE — 71046 X-RAY EXAM CHEST 2 VIEWS: CPT | Mod: TC,FY,PO

## 2021-01-26 PROCEDURE — 93970 EXTREMITY STUDY: CPT | Mod: TC,PO

## 2021-01-26 PROCEDURE — 93922 US ARTERIAL LOWER EXTREMITY BILAT WITH ABI (XPD): ICD-10-PCS | Mod: 26,,, | Performed by: RADIOLOGY

## 2021-01-26 PROCEDURE — 93922 UPR/L XTREMITY ART 2 LEVELS: CPT | Mod: TC,PO

## 2021-01-26 PROCEDURE — 93970 US LOWER EXTREMITY VEINS BILATERAL: ICD-10-PCS | Mod: 26,,, | Performed by: RADIOLOGY

## 2021-01-26 PROCEDURE — 71046 XR CHEST PA AND LATERAL: ICD-10-PCS | Mod: 26,,, | Performed by: RADIOLOGY

## 2021-01-26 PROCEDURE — 71046 X-RAY EXAM CHEST 2 VIEWS: CPT | Mod: 26,,, | Performed by: RADIOLOGY

## 2021-01-26 PROCEDURE — 93970 EXTREMITY STUDY: CPT | Mod: 26,,, | Performed by: RADIOLOGY

## 2021-01-26 PROCEDURE — 93925 US ARTERIAL LOWER EXTREMITY BILAT WITH ABI (XPD): ICD-10-PCS | Mod: 26,,, | Performed by: RADIOLOGY

## 2021-01-26 PROCEDURE — 93922 UPR/L XTREMITY ART 2 LEVELS: CPT | Mod: 26,,, | Performed by: RADIOLOGY

## 2021-01-26 PROCEDURE — 93925 LOWER EXTREMITY STUDY: CPT | Mod: 26,,, | Performed by: RADIOLOGY

## 2021-01-27 ENCOUNTER — TELEPHONE (OUTPATIENT)
Dept: CARDIOLOGY | Facility: CLINIC | Age: 66
End: 2021-01-27

## 2021-01-28 ENCOUNTER — OFFICE VISIT (OUTPATIENT)
Dept: CARDIOLOGY | Facility: CLINIC | Age: 66
End: 2021-01-28
Payer: MEDICARE

## 2021-01-28 VITALS
DIASTOLIC BLOOD PRESSURE: 100 MMHG | HEIGHT: 70 IN | WEIGHT: 276.25 LBS | BODY MASS INDEX: 39.55 KG/M2 | HEART RATE: 88 BPM | SYSTOLIC BLOOD PRESSURE: 177 MMHG

## 2021-01-28 DIAGNOSIS — Z03.818 ENCNTR FOR OBS FOR SUSP EXPSR TO OTH BIOLG AGENTS RULED OUT: Primary | ICD-10-CM

## 2021-01-28 DIAGNOSIS — I10 ESSENTIAL HYPERTENSION: Primary | ICD-10-CM

## 2021-01-28 DIAGNOSIS — Z01.818 PRE-OP TESTING: ICD-10-CM

## 2021-01-28 DIAGNOSIS — F17.200 TOBACCO USE DISORDER: ICD-10-CM

## 2021-01-28 DIAGNOSIS — Z20.822 ENCOUNTER FOR LABORATORY TESTING FOR COVID-19 VIRUS: ICD-10-CM

## 2021-01-28 DIAGNOSIS — I73.9 PAD (PERIPHERAL ARTERY DISEASE): ICD-10-CM

## 2021-01-28 PROCEDURE — 99214 OFFICE O/P EST MOD 30 MIN: CPT | Mod: PBBFAC,PO | Performed by: PHYSICIAN ASSISTANT

## 2021-01-28 PROCEDURE — 99999 PR PBB SHADOW E&M-EST. PATIENT-LVL IV: CPT | Mod: PBBFAC,,, | Performed by: PHYSICIAN ASSISTANT

## 2021-01-28 PROCEDURE — 99214 PR OFFICE/OUTPT VISIT, EST, LEVL IV, 30-39 MIN: ICD-10-PCS | Mod: S$PBB,,, | Performed by: PHYSICIAN ASSISTANT

## 2021-01-28 PROCEDURE — 99214 OFFICE O/P EST MOD 30 MIN: CPT | Mod: S$PBB,,, | Performed by: PHYSICIAN ASSISTANT

## 2021-01-28 PROCEDURE — 99999 PR PBB SHADOW E&M-EST. PATIENT-LVL IV: ICD-10-PCS | Mod: PBBFAC,,, | Performed by: PHYSICIAN ASSISTANT

## 2021-01-28 RX ORDER — SODIUM CHLORIDE 9 MG/ML
INJECTION, SOLUTION INTRAVENOUS CONTINUOUS
Status: CANCELLED | OUTPATIENT
Start: 2021-01-28

## 2021-01-28 RX ORDER — CLOPIDOGREL BISULFATE 75 MG/1
TABLET ORAL
Qty: 5 TABLET | Refills: 0 | Status: SHIPPED | OUTPATIENT
Start: 2021-01-28 | End: 2021-05-11

## 2021-01-29 ENCOUNTER — PATIENT MESSAGE (OUTPATIENT)
Dept: ADMINISTRATIVE | Facility: HOSPITAL | Age: 66
End: 2021-01-29

## 2021-01-29 DIAGNOSIS — I73.9 CLAUDICATION: Primary | ICD-10-CM

## 2021-01-30 ENCOUNTER — TELEPHONE (OUTPATIENT)
Dept: FAMILY MEDICINE | Facility: CLINIC | Age: 66
End: 2021-01-30

## 2021-02-01 ENCOUNTER — PATIENT OUTREACH (OUTPATIENT)
Dept: ADMINISTRATIVE | Facility: OTHER | Age: 66
End: 2021-02-01

## 2021-02-03 ENCOUNTER — OFFICE VISIT (OUTPATIENT)
Dept: PAIN MEDICINE | Facility: CLINIC | Age: 66
End: 2021-02-03
Payer: MEDICARE

## 2021-02-03 ENCOUNTER — HOSPITAL ENCOUNTER (OUTPATIENT)
Dept: RADIOLOGY | Facility: HOSPITAL | Age: 66
Discharge: HOME | End: 2021-02-03
Attending: ANESTHESIOLOGY
Payer: MEDICARE

## 2021-02-03 VITALS
OXYGEN SATURATION: 96 % | WEIGHT: 271.5 LBS | HEART RATE: 100 BPM | TEMPERATURE: 97 F | DIASTOLIC BLOOD PRESSURE: 78 MMHG | SYSTOLIC BLOOD PRESSURE: 148 MMHG | BODY MASS INDEX: 38.87 KG/M2 | HEIGHT: 70 IN

## 2021-02-03 DIAGNOSIS — M54.9 DORSALGIA, UNSPECIFIED: ICD-10-CM

## 2021-02-03 DIAGNOSIS — M47.816 LUMBAR SPONDYLOSIS: Primary | ICD-10-CM

## 2021-02-03 DIAGNOSIS — Z13.9 SCREENING PROCEDURE: ICD-10-CM

## 2021-02-03 DIAGNOSIS — G95.9 CERVICAL MYELOPATHY: ICD-10-CM

## 2021-02-03 PROCEDURE — 99204 OFFICE O/P NEW MOD 45 MIN: CPT | Mod: S$PBB,,, | Performed by: ANESTHESIOLOGY

## 2021-02-03 PROCEDURE — 99999 PR PBB SHADOW E&M-EST. PATIENT-LVL V: ICD-10-PCS | Mod: PBBFAC,,, | Performed by: ANESTHESIOLOGY

## 2021-02-03 PROCEDURE — 99215 OFFICE O/P EST HI 40 MIN: CPT | Mod: PBBFAC,25,PN | Performed by: ANESTHESIOLOGY

## 2021-02-03 PROCEDURE — 99999 PR PBB SHADOW E&M-EST. PATIENT-LVL V: CPT | Mod: PBBFAC,,, | Performed by: ANESTHESIOLOGY

## 2021-02-03 PROCEDURE — 72110 X-RAY EXAM L-2 SPINE 4/>VWS: CPT | Mod: 26,,, | Performed by: RADIOLOGY

## 2021-02-03 PROCEDURE — 72110 XR LUMBAR SPINE 5 VIEW WITH FLEX AND EXT: ICD-10-PCS | Mod: 26,,, | Performed by: RADIOLOGY

## 2021-02-03 PROCEDURE — 72110 X-RAY EXAM L-2 SPINE 4/>VWS: CPT | Mod: TC,FY,PO

## 2021-02-03 PROCEDURE — 99204 PR OFFICE/OUTPT VISIT, NEW, LEVL IV, 45-59 MIN: ICD-10-PCS | Mod: S$PBB,,, | Performed by: ANESTHESIOLOGY

## 2021-02-03 RX ORDER — SODIUM CHLORIDE, SODIUM LACTATE, POTASSIUM CHLORIDE, CALCIUM CHLORIDE 600; 310; 30; 20 MG/100ML; MG/100ML; MG/100ML; MG/100ML
INJECTION, SOLUTION INTRAVENOUS CONTINUOUS
Status: CANCELLED | OUTPATIENT
Start: 2021-02-09

## 2021-02-04 ENCOUNTER — TELEPHONE (OUTPATIENT)
Dept: PAIN MEDICINE | Facility: CLINIC | Age: 66
End: 2021-02-04

## 2021-02-04 DIAGNOSIS — G95.9 CERVICAL MYELOPATHY: Primary | ICD-10-CM

## 2021-02-05 ENCOUNTER — TELEPHONE (OUTPATIENT)
Dept: NEUROSURGERY | Facility: CLINIC | Age: 66
End: 2021-02-05

## 2021-02-06 ENCOUNTER — LAB VISIT (OUTPATIENT)
Dept: FAMILY MEDICINE | Facility: CLINIC | Age: 66
End: 2021-02-06
Payer: MEDICARE

## 2021-02-06 DIAGNOSIS — Z13.9 SCREENING PROCEDURE: ICD-10-CM

## 2021-02-06 PROCEDURE — U0003 INFECTIOUS AGENT DETECTION BY NUCLEIC ACID (DNA OR RNA); SEVERE ACUTE RESPIRATORY SYNDROME CORONAVIRUS 2 (SARS-COV-2) (CORONAVIRUS DISEASE [COVID-19]), AMPLIFIED PROBE TECHNIQUE, MAKING USE OF HIGH THROUGHPUT TECHNOLOGIES AS DESCRIBED BY CMS-2020-01-R: HCPCS

## 2021-02-07 LAB — SARS-COV-2 RNA RESP QL NAA+PROBE: NOT DETECTED

## 2021-02-08 ENCOUNTER — OFFICE VISIT (OUTPATIENT)
Dept: FAMILY MEDICINE | Facility: CLINIC | Age: 66
End: 2021-02-08
Payer: MEDICARE

## 2021-02-08 ENCOUNTER — TELEPHONE (OUTPATIENT)
Dept: PAIN MEDICINE | Facility: CLINIC | Age: 66
End: 2021-02-08

## 2021-02-08 VITALS
WEIGHT: 273.56 LBS | DIASTOLIC BLOOD PRESSURE: 80 MMHG | TEMPERATURE: 98 F | SYSTOLIC BLOOD PRESSURE: 130 MMHG | BODY MASS INDEX: 39.16 KG/M2 | HEIGHT: 70 IN | HEART RATE: 76 BPM

## 2021-02-08 DIAGNOSIS — I10 ESSENTIAL HYPERTENSION: Primary | ICD-10-CM

## 2021-02-08 PROCEDURE — 99214 PR OFFICE/OUTPT VISIT, EST, LEVL IV, 30-39 MIN: ICD-10-PCS | Mod: S$GLB,,, | Performed by: PHYSICIAN ASSISTANT

## 2021-02-08 PROCEDURE — 99214 OFFICE O/P EST MOD 30 MIN: CPT | Mod: S$GLB,,, | Performed by: PHYSICIAN ASSISTANT

## 2021-02-09 ENCOUNTER — HOSPITAL ENCOUNTER (OUTPATIENT)
Dept: RADIOLOGY | Facility: HOSPITAL | Age: 66
Discharge: HOME | End: 2021-02-09
Attending: ANESTHESIOLOGY
Payer: MEDICARE

## 2021-02-09 ENCOUNTER — HOSPITAL ENCOUNTER (OUTPATIENT)
Facility: HOSPITAL | Age: 66
Discharge: HOME | End: 2021-02-09
Attending: ANESTHESIOLOGY | Admitting: ANESTHESIOLOGY
Payer: MEDICARE

## 2021-02-09 VITALS
RESPIRATION RATE: 18 BRPM | SYSTOLIC BLOOD PRESSURE: 168 MMHG | OXYGEN SATURATION: 98 % | TEMPERATURE: 98 F | HEART RATE: 74 BPM | DIASTOLIC BLOOD PRESSURE: 72 MMHG

## 2021-02-09 DIAGNOSIS — M47.816 LUMBAR SPONDYLOSIS: Primary | ICD-10-CM

## 2021-02-09 DIAGNOSIS — M47.816 LUMBAR SPONDYLOSIS: ICD-10-CM

## 2021-02-09 PROCEDURE — 25000003 PHARM REV CODE 250: Mod: PO | Performed by: ANESTHESIOLOGY

## 2021-02-09 PROCEDURE — 64494 PR INJ DX/THER AGNT PARAVERT FACET JOINT,IMG GUIDE,LUMBAR/SAC, 2ND LEVEL: ICD-10-PCS | Mod: 50,,, | Performed by: ANESTHESIOLOGY

## 2021-02-09 PROCEDURE — 99152 MOD SED SAME PHYS/QHP 5/>YRS: CPT | Mod: ,,, | Performed by: ANESTHESIOLOGY

## 2021-02-09 PROCEDURE — 64494 INJ PARAVERT F JNT L/S 2 LEV: CPT | Mod: 50,PO | Performed by: ANESTHESIOLOGY

## 2021-02-09 PROCEDURE — 64493 INJ PARAVERT F JNT L/S 1 LEV: CPT | Mod: 50,,, | Performed by: ANESTHESIOLOGY

## 2021-02-09 PROCEDURE — 64493 PR INJ DX/THER AGNT PARAVERT FACET JOINT,IMG GUIDE,LUMBAR/SAC,1ST LVL: ICD-10-PCS | Mod: 50,,, | Performed by: ANESTHESIOLOGY

## 2021-02-09 PROCEDURE — 64494 INJ PARAVERT F JNT L/S 2 LEV: CPT | Mod: 50,,, | Performed by: ANESTHESIOLOGY

## 2021-02-09 PROCEDURE — 64495 INJ PARAVERT F JNT L/S 3 LEV: CPT | Mod: 50,PO | Performed by: ANESTHESIOLOGY

## 2021-02-09 PROCEDURE — 63600175 PHARM REV CODE 636 W HCPCS: Mod: PO | Performed by: ANESTHESIOLOGY

## 2021-02-09 PROCEDURE — 64493 INJ PARAVERT F JNT L/S 1 LEV: CPT | Mod: 50,PO | Performed by: ANESTHESIOLOGY

## 2021-02-09 PROCEDURE — 99152 PR MOD CONSCIOUS SEDATION, SAME PHYS, 5+ YRS, FIRST 15 MIN: ICD-10-PCS | Mod: ,,, | Performed by: ANESTHESIOLOGY

## 2021-02-09 PROCEDURE — 76000 FLUOROSCOPY <1 HR PHYS/QHP: CPT | Mod: TC,PO

## 2021-02-09 RX ORDER — SODIUM BICARBONATE 42 MG/ML
INJECTION, SOLUTION INTRAVENOUS
Status: DISCONTINUED | OUTPATIENT
Start: 2021-02-09 | End: 2021-02-09 | Stop reason: HOSPADM

## 2021-02-09 RX ORDER — MIDAZOLAM HYDROCHLORIDE 1 MG/ML
INJECTION INTRAMUSCULAR; INTRAVENOUS
Status: DISCONTINUED | OUTPATIENT
Start: 2021-02-09 | End: 2021-02-09 | Stop reason: HOSPADM

## 2021-02-09 RX ORDER — LIDOCAINE HYDROCHLORIDE 10 MG/ML
INJECTION, SOLUTION EPIDURAL; INFILTRATION; INTRACAUDAL; PERINEURAL
Status: DISCONTINUED | OUTPATIENT
Start: 2021-02-09 | End: 2021-02-09 | Stop reason: HOSPADM

## 2021-02-09 RX ORDER — BUPIVACAINE HYDROCHLORIDE 2.5 MG/ML
INJECTION, SOLUTION EPIDURAL; INFILTRATION; INTRACAUDAL
Status: DISCONTINUED | OUTPATIENT
Start: 2021-02-09 | End: 2021-02-09 | Stop reason: HOSPADM

## 2021-02-10 ENCOUNTER — TELEPHONE (OUTPATIENT)
Dept: PAIN MEDICINE | Facility: CLINIC | Age: 66
End: 2021-02-10

## 2021-02-10 DIAGNOSIS — Z11.9 SCREENING EXAMINATION FOR INFECTIOUS DISEASE: ICD-10-CM

## 2021-02-10 DIAGNOSIS — M47.816 LUMBAR SPONDYLOSIS: Primary | ICD-10-CM

## 2021-02-10 RX ORDER — SODIUM CHLORIDE, SODIUM LACTATE, POTASSIUM CHLORIDE, CALCIUM CHLORIDE 600; 310; 30; 20 MG/100ML; MG/100ML; MG/100ML; MG/100ML
INJECTION, SOLUTION INTRAVENOUS CONTINUOUS
Status: CANCELLED | OUTPATIENT
Start: 2021-02-10

## 2021-02-11 ENCOUNTER — TELEPHONE (OUTPATIENT)
Dept: NEUROSURGERY | Facility: CLINIC | Age: 66
End: 2021-02-11

## 2021-02-11 ENCOUNTER — OFFICE VISIT (OUTPATIENT)
Dept: NEUROSURGERY | Facility: CLINIC | Age: 66
End: 2021-02-11
Payer: MEDICARE

## 2021-02-11 VITALS
HEIGHT: 70 IN | SYSTOLIC BLOOD PRESSURE: 135 MMHG | DIASTOLIC BLOOD PRESSURE: 88 MMHG | HEART RATE: 79 BPM | WEIGHT: 276 LBS | BODY MASS INDEX: 39.51 KG/M2 | RESPIRATION RATE: 16 BRPM

## 2021-02-11 DIAGNOSIS — G95.9 CERVICAL MYELOPATHY: ICD-10-CM

## 2021-02-11 PROCEDURE — 99213 OFFICE O/P EST LOW 20 MIN: CPT | Mod: PBBFAC,PN | Performed by: STUDENT IN AN ORGANIZED HEALTH CARE EDUCATION/TRAINING PROGRAM

## 2021-02-11 PROCEDURE — 99205 PR OFFICE/OUTPT VISIT, NEW, LEVL V, 60-74 MIN: ICD-10-PCS | Mod: S$PBB,,, | Performed by: STUDENT IN AN ORGANIZED HEALTH CARE EDUCATION/TRAINING PROGRAM

## 2021-02-11 PROCEDURE — 99999 PR PBB SHADOW E&M-EST. PATIENT-LVL III: CPT | Mod: PBBFAC,,, | Performed by: STUDENT IN AN ORGANIZED HEALTH CARE EDUCATION/TRAINING PROGRAM

## 2021-02-11 PROCEDURE — 99205 OFFICE O/P NEW HI 60 MIN: CPT | Mod: S$PBB,,, | Performed by: STUDENT IN AN ORGANIZED HEALTH CARE EDUCATION/TRAINING PROGRAM

## 2021-02-11 PROCEDURE — 99999 PR PBB SHADOW E&M-EST. PATIENT-LVL III: ICD-10-PCS | Mod: PBBFAC,,, | Performed by: STUDENT IN AN ORGANIZED HEALTH CARE EDUCATION/TRAINING PROGRAM

## 2021-02-12 ENCOUNTER — LAB VISIT (OUTPATIENT)
Dept: FAMILY MEDICINE | Facility: CLINIC | Age: 66
End: 2021-02-12
Payer: MEDICARE

## 2021-02-12 DIAGNOSIS — Z03.818 ENCNTR FOR OBS FOR SUSP EXPSR TO OTH BIOLG AGENTS RULED OUT: ICD-10-CM

## 2021-02-12 PROCEDURE — U0003 INFECTIOUS AGENT DETECTION BY NUCLEIC ACID (DNA OR RNA); SEVERE ACUTE RESPIRATORY SYNDROME CORONAVIRUS 2 (SARS-COV-2) (CORONAVIRUS DISEASE [COVID-19]), AMPLIFIED PROBE TECHNIQUE, MAKING USE OF HIGH THROUGHPUT TECHNOLOGIES AS DESCRIBED BY CMS-2020-01-R: HCPCS

## 2021-02-12 PROCEDURE — U0005 INFEC AGEN DETEC AMPLI PROBE: HCPCS

## 2021-02-13 LAB — SARS-COV-2 RNA RESP QL NAA+PROBE: NOT DETECTED

## 2021-02-25 ENCOUNTER — OFFICE VISIT (OUTPATIENT)
Dept: CARDIOLOGY | Facility: CLINIC | Age: 66
End: 2021-02-25
Payer: MEDICARE

## 2021-02-25 VITALS
HEART RATE: 99 BPM | BODY MASS INDEX: 38.89 KG/M2 | SYSTOLIC BLOOD PRESSURE: 148 MMHG | HEIGHT: 70 IN | DIASTOLIC BLOOD PRESSURE: 95 MMHG | WEIGHT: 271.63 LBS

## 2021-02-25 DIAGNOSIS — I73.9 PAD (PERIPHERAL ARTERY DISEASE): Primary | ICD-10-CM

## 2021-02-25 DIAGNOSIS — F17.200 TOBACCO USE DISORDER: ICD-10-CM

## 2021-02-25 DIAGNOSIS — I10 ESSENTIAL HYPERTENSION: ICD-10-CM

## 2021-02-25 PROCEDURE — 99214 OFFICE O/P EST MOD 30 MIN: CPT | Mod: S$PBB,,, | Performed by: PHYSICIAN ASSISTANT

## 2021-02-25 PROCEDURE — 99999 PR PBB SHADOW E&M-EST. PATIENT-LVL III: CPT | Mod: PBBFAC,,, | Performed by: PHYSICIAN ASSISTANT

## 2021-02-25 PROCEDURE — 99214 PR OFFICE/OUTPT VISIT, EST, LEVL IV, 30-39 MIN: ICD-10-PCS | Mod: S$PBB,,, | Performed by: PHYSICIAN ASSISTANT

## 2021-02-25 PROCEDURE — 99213 OFFICE O/P EST LOW 20 MIN: CPT | Mod: PBBFAC,PO | Performed by: PHYSICIAN ASSISTANT

## 2021-02-25 PROCEDURE — 99999 PR PBB SHADOW E&M-EST. PATIENT-LVL III: ICD-10-PCS | Mod: PBBFAC,,, | Performed by: PHYSICIAN ASSISTANT

## 2021-02-26 ENCOUNTER — LAB VISIT (OUTPATIENT)
Dept: FAMILY MEDICINE | Facility: CLINIC | Age: 66
End: 2021-02-26
Payer: MEDICARE

## 2021-02-26 DIAGNOSIS — Z11.9 SCREENING EXAMINATION FOR INFECTIOUS DISEASE: ICD-10-CM

## 2021-02-26 PROCEDURE — U0005 INFEC AGEN DETEC AMPLI PROBE: HCPCS

## 2021-02-26 PROCEDURE — U0003 INFECTIOUS AGENT DETECTION BY NUCLEIC ACID (DNA OR RNA); SEVERE ACUTE RESPIRATORY SYNDROME CORONAVIRUS 2 (SARS-COV-2) (CORONAVIRUS DISEASE [COVID-19]), AMPLIFIED PROBE TECHNIQUE, MAKING USE OF HIGH THROUGHPUT TECHNOLOGIES AS DESCRIBED BY CMS-2020-01-R: HCPCS

## 2021-02-27 LAB — SARS-COV-2 RNA RESP QL NAA+PROBE: NOT DETECTED

## 2021-03-01 ENCOUNTER — HOSPITAL ENCOUNTER (OUTPATIENT)
Dept: RADIOLOGY | Facility: HOSPITAL | Age: 66
Discharge: HOME | End: 2021-03-01
Attending: ANESTHESIOLOGY
Payer: MEDICARE

## 2021-03-01 ENCOUNTER — HOSPITAL ENCOUNTER (OUTPATIENT)
Facility: HOSPITAL | Age: 66
Discharge: HOME | End: 2021-03-01
Attending: ANESTHESIOLOGY | Admitting: ANESTHESIOLOGY
Payer: MEDICARE

## 2021-03-01 VITALS
WEIGHT: 270 LBS | SYSTOLIC BLOOD PRESSURE: 143 MMHG | HEIGHT: 70 IN | TEMPERATURE: 97 F | RESPIRATION RATE: 16 BRPM | OXYGEN SATURATION: 95 % | BODY MASS INDEX: 38.65 KG/M2 | HEART RATE: 82 BPM | DIASTOLIC BLOOD PRESSURE: 72 MMHG

## 2021-03-01 DIAGNOSIS — M47.816 LUMBAR SPONDYLOSIS: Primary | ICD-10-CM

## 2021-03-01 DIAGNOSIS — M51.36 DDD (DEGENERATIVE DISC DISEASE), LUMBAR: ICD-10-CM

## 2021-03-01 PROCEDURE — 99152 PR MOD CONSCIOUS SEDATION, SAME PHYS, 5+ YRS, FIRST 15 MIN: ICD-10-PCS | Mod: ,,, | Performed by: ANESTHESIOLOGY

## 2021-03-01 PROCEDURE — 25000003 PHARM REV CODE 250: Mod: PO | Performed by: ANESTHESIOLOGY

## 2021-03-01 PROCEDURE — 64636 DESTROY L/S FACET JNT ADDL: CPT | Mod: RT,,, | Performed by: ANESTHESIOLOGY

## 2021-03-01 PROCEDURE — 99152 MOD SED SAME PHYS/QHP 5/>YRS: CPT | Mod: ,,, | Performed by: ANESTHESIOLOGY

## 2021-03-01 PROCEDURE — 64636 DESTROY L/S FACET JNT ADDL: CPT | Mod: PO | Performed by: ANESTHESIOLOGY

## 2021-03-01 PROCEDURE — 64636 PR DESTROY L/S FACET JNT ADDL: ICD-10-PCS | Mod: RT,,, | Performed by: ANESTHESIOLOGY

## 2021-03-01 PROCEDURE — 64635 DESTROY LUMB/SAC FACET JNT: CPT | Mod: RT,,, | Performed by: ANESTHESIOLOGY

## 2021-03-01 PROCEDURE — 63600175 PHARM REV CODE 636 W HCPCS: Mod: PO | Performed by: ANESTHESIOLOGY

## 2021-03-01 PROCEDURE — 76000 FLUOROSCOPY <1 HR PHYS/QHP: CPT | Mod: TC,PO

## 2021-03-01 PROCEDURE — 64635 PR DESTROY LUMB/SAC FACET JNT: ICD-10-PCS | Mod: RT,,, | Performed by: ANESTHESIOLOGY

## 2021-03-01 PROCEDURE — 64635 DESTROY LUMB/SAC FACET JNT: CPT | Mod: PO | Performed by: ANESTHESIOLOGY

## 2021-03-01 RX ORDER — MIDAZOLAM HYDROCHLORIDE 2 MG/2ML
INJECTION, SOLUTION INTRAMUSCULAR; INTRAVENOUS
Status: DISCONTINUED | OUTPATIENT
Start: 2021-03-01 | End: 2021-03-01 | Stop reason: HOSPADM

## 2021-03-01 RX ORDER — LIDOCAINE HYDROCHLORIDE 10 MG/ML
INJECTION, SOLUTION EPIDURAL; INFILTRATION; INTRACAUDAL; PERINEURAL
Status: DISCONTINUED | OUTPATIENT
Start: 2021-03-01 | End: 2021-03-01 | Stop reason: HOSPADM

## 2021-03-01 RX ORDER — SODIUM CHLORIDE, SODIUM LACTATE, POTASSIUM CHLORIDE, CALCIUM CHLORIDE 600; 310; 30; 20 MG/100ML; MG/100ML; MG/100ML; MG/100ML
INJECTION, SOLUTION INTRAVENOUS CONTINUOUS
Status: DISCONTINUED | OUTPATIENT
Start: 2021-03-01 | End: 2021-03-01 | Stop reason: HOSPADM

## 2021-03-01 RX ORDER — FENTANYL CITRATE 50 UG/ML
INJECTION, SOLUTION INTRAMUSCULAR; INTRAVENOUS
Status: DISCONTINUED | OUTPATIENT
Start: 2021-03-01 | End: 2021-03-01 | Stop reason: HOSPADM

## 2021-03-01 RX ORDER — LIDOCAINE HYDROCHLORIDE 20 MG/ML
INJECTION, SOLUTION EPIDURAL; INFILTRATION; INTRACAUDAL; PERINEURAL
Status: DISCONTINUED | OUTPATIENT
Start: 2021-03-01 | End: 2021-03-01 | Stop reason: HOSPADM

## 2021-03-01 RX ORDER — TRIAMCINOLONE ACETONIDE 40 MG/ML
INJECTION, SUSPENSION INTRA-ARTICULAR; INTRAMUSCULAR
Status: DISCONTINUED | OUTPATIENT
Start: 2021-03-01 | End: 2021-03-01 | Stop reason: HOSPADM

## 2021-03-01 RX ADMIN — SODIUM CHLORIDE, SODIUM LACTATE, POTASSIUM CHLORIDE, AND CALCIUM CHLORIDE: .6; .31; .03; .02 INJECTION, SOLUTION INTRAVENOUS at 09:03

## 2021-03-07 ENCOUNTER — LAB VISIT (OUTPATIENT)
Dept: FAMILY MEDICINE | Facility: CLINIC | Age: 66
End: 2021-03-07
Payer: MEDICARE

## 2021-03-07 DIAGNOSIS — Z11.9 SCREENING EXAMINATION FOR INFECTIOUS DISEASE: ICD-10-CM

## 2021-03-07 LAB — SARS-COV-2 RNA RESP QL NAA+PROBE: NOT DETECTED

## 2021-03-07 PROCEDURE — U0005 INFEC AGEN DETEC AMPLI PROBE: HCPCS | Performed by: ANESTHESIOLOGY

## 2021-03-07 PROCEDURE — U0003 INFECTIOUS AGENT DETECTION BY NUCLEIC ACID (DNA OR RNA); SEVERE ACUTE RESPIRATORY SYNDROME CORONAVIRUS 2 (SARS-COV-2) (CORONAVIRUS DISEASE [COVID-19]), AMPLIFIED PROBE TECHNIQUE, MAKING USE OF HIGH THROUGHPUT TECHNOLOGIES AS DESCRIBED BY CMS-2020-01-R: HCPCS | Performed by: ANESTHESIOLOGY

## 2021-03-10 ENCOUNTER — HOSPITAL ENCOUNTER (OUTPATIENT)
Dept: RADIOLOGY | Facility: HOSPITAL | Age: 66
Discharge: HOME | End: 2021-03-10
Attending: ANESTHESIOLOGY | Admitting: ANESTHESIOLOGY
Payer: MEDICARE

## 2021-03-10 ENCOUNTER — HOSPITAL ENCOUNTER (OUTPATIENT)
Facility: HOSPITAL | Age: 66
Discharge: HOME | End: 2021-03-10
Attending: ANESTHESIOLOGY | Admitting: ANESTHESIOLOGY
Payer: MEDICARE

## 2021-03-10 VITALS
HEART RATE: 70 BPM | RESPIRATION RATE: 20 BRPM | DIASTOLIC BLOOD PRESSURE: 63 MMHG | SYSTOLIC BLOOD PRESSURE: 138 MMHG | TEMPERATURE: 98 F | OXYGEN SATURATION: 95 % | WEIGHT: 270 LBS | BODY MASS INDEX: 38.65 KG/M2 | HEIGHT: 70 IN

## 2021-03-10 DIAGNOSIS — M47.816 LUMBAR SPONDYLOSIS: Primary | ICD-10-CM

## 2021-03-10 DIAGNOSIS — M47.816 LUMBAR SPONDYLOSIS: ICD-10-CM

## 2021-03-10 PROCEDURE — 63600175 PHARM REV CODE 636 W HCPCS: Mod: PO | Performed by: ANESTHESIOLOGY

## 2021-03-10 PROCEDURE — 99152 MOD SED SAME PHYS/QHP 5/>YRS: CPT | Mod: ,,, | Performed by: ANESTHESIOLOGY

## 2021-03-10 PROCEDURE — 64636 PR DESTROY L/S FACET JNT ADDL: ICD-10-PCS | Mod: LT,,, | Performed by: ANESTHESIOLOGY

## 2021-03-10 PROCEDURE — 99152 PR MOD CONSCIOUS SEDATION, SAME PHYS, 5+ YRS, FIRST 15 MIN: ICD-10-PCS | Mod: ,,, | Performed by: ANESTHESIOLOGY

## 2021-03-10 PROCEDURE — 76000 FLUOROSCOPY <1 HR PHYS/QHP: CPT | Mod: TC,PO

## 2021-03-10 PROCEDURE — 64636 DESTROY L/S FACET JNT ADDL: CPT | Mod: LT,,, | Performed by: ANESTHESIOLOGY

## 2021-03-10 PROCEDURE — 64635 DESTROY LUMB/SAC FACET JNT: CPT | Mod: LT,,, | Performed by: ANESTHESIOLOGY

## 2021-03-10 PROCEDURE — 64636 DESTROY L/S FACET JNT ADDL: CPT | Mod: PO | Performed by: ANESTHESIOLOGY

## 2021-03-10 PROCEDURE — 25000003 PHARM REV CODE 250: Mod: PO | Performed by: ANESTHESIOLOGY

## 2021-03-10 PROCEDURE — 64635 PR DESTROY LUMB/SAC FACET JNT: ICD-10-PCS | Mod: LT,,, | Performed by: ANESTHESIOLOGY

## 2021-03-10 PROCEDURE — 64635 DESTROY LUMB/SAC FACET JNT: CPT | Mod: PO | Performed by: ANESTHESIOLOGY

## 2021-03-10 RX ORDER — BUPIVACAINE HYDROCHLORIDE 2.5 MG/ML
INJECTION, SOLUTION EPIDURAL; INFILTRATION; INTRACAUDAL
Status: DISCONTINUED | OUTPATIENT
Start: 2021-03-10 | End: 2021-03-10 | Stop reason: HOSPADM

## 2021-03-10 RX ORDER — FENTANYL CITRATE 50 UG/ML
INJECTION, SOLUTION INTRAMUSCULAR; INTRAVENOUS
Status: DISCONTINUED | OUTPATIENT
Start: 2021-03-10 | End: 2021-03-10 | Stop reason: HOSPADM

## 2021-03-10 RX ORDER — SODIUM BICARBONATE 42 MG/ML
INJECTION, SOLUTION INTRAVENOUS
Status: DISCONTINUED | OUTPATIENT
Start: 2021-03-10 | End: 2021-03-10 | Stop reason: HOSPADM

## 2021-03-10 RX ORDER — MIDAZOLAM HYDROCHLORIDE 2 MG/2ML
INJECTION, SOLUTION INTRAMUSCULAR; INTRAVENOUS
Status: DISCONTINUED | OUTPATIENT
Start: 2021-03-10 | End: 2021-03-10 | Stop reason: HOSPADM

## 2021-03-10 RX ORDER — TRIAMCINOLONE ACETONIDE 40 MG/ML
INJECTION, SUSPENSION INTRA-ARTICULAR; INTRAMUSCULAR
Status: DISCONTINUED | OUTPATIENT
Start: 2021-03-10 | End: 2021-03-10 | Stop reason: HOSPADM

## 2021-03-10 RX ORDER — SODIUM CHLORIDE, SODIUM LACTATE, POTASSIUM CHLORIDE, CALCIUM CHLORIDE 600; 310; 30; 20 MG/100ML; MG/100ML; MG/100ML; MG/100ML
INJECTION, SOLUTION INTRAVENOUS CONTINUOUS
Status: DISCONTINUED | OUTPATIENT
Start: 2021-03-10 | End: 2021-03-10 | Stop reason: HOSPADM

## 2021-03-10 RX ORDER — LIDOCAINE HYDROCHLORIDE 10 MG/ML
INJECTION, SOLUTION EPIDURAL; INFILTRATION; INTRACAUDAL; PERINEURAL
Status: DISCONTINUED | OUTPATIENT
Start: 2021-03-10 | End: 2021-03-10 | Stop reason: HOSPADM

## 2021-03-10 RX ADMIN — SODIUM CHLORIDE, SODIUM LACTATE, POTASSIUM CHLORIDE, AND CALCIUM CHLORIDE: .6; .31; .03; .02 INJECTION, SOLUTION INTRAVENOUS at 01:03

## 2021-03-24 ENCOUNTER — PATIENT OUTREACH (OUTPATIENT)
Dept: ADMINISTRATIVE | Facility: OTHER | Age: 66
End: 2021-03-24

## 2021-03-26 ENCOUNTER — OFFICE VISIT (OUTPATIENT)
Dept: PAIN MEDICINE | Facility: CLINIC | Age: 66
End: 2021-03-26
Payer: MEDICARE

## 2021-03-26 VITALS
WEIGHT: 272.81 LBS | RESPIRATION RATE: 20 BRPM | OXYGEN SATURATION: 96 % | SYSTOLIC BLOOD PRESSURE: 138 MMHG | HEART RATE: 76 BPM | TEMPERATURE: 98 F | DIASTOLIC BLOOD PRESSURE: 68 MMHG | BODY MASS INDEX: 39.15 KG/M2

## 2021-03-26 DIAGNOSIS — M54.9 DORSALGIA, UNSPECIFIED: ICD-10-CM

## 2021-03-26 DIAGNOSIS — M47.816 LUMBAR SPONDYLOSIS: Primary | ICD-10-CM

## 2021-03-26 PROCEDURE — 99214 PR OFFICE/OUTPT VISIT, EST, LEVL IV, 30-39 MIN: ICD-10-PCS | Mod: S$PBB,,, | Performed by: ANESTHESIOLOGY

## 2021-03-26 PROCEDURE — 99214 OFFICE O/P EST MOD 30 MIN: CPT | Mod: PBBFAC,PN | Performed by: ANESTHESIOLOGY

## 2021-03-26 PROCEDURE — 99999 PR PBB SHADOW E&M-EST. PATIENT-LVL IV: CPT | Mod: PBBFAC,,, | Performed by: ANESTHESIOLOGY

## 2021-03-26 PROCEDURE — 99999 PR PBB SHADOW E&M-EST. PATIENT-LVL IV: ICD-10-PCS | Mod: PBBFAC,,, | Performed by: ANESTHESIOLOGY

## 2021-03-26 PROCEDURE — 99214 OFFICE O/P EST MOD 30 MIN: CPT | Mod: S$PBB,,, | Performed by: ANESTHESIOLOGY

## 2021-03-29 ENCOUNTER — TELEPHONE (OUTPATIENT)
Dept: PAIN MEDICINE | Facility: CLINIC | Age: 66
End: 2021-03-29

## 2021-05-11 ENCOUNTER — OFFICE VISIT (OUTPATIENT)
Dept: FAMILY MEDICINE | Facility: CLINIC | Age: 66
End: 2021-05-11
Payer: MEDICARE

## 2021-05-11 VITALS
SYSTOLIC BLOOD PRESSURE: 148 MMHG | TEMPERATURE: 98 F | OXYGEN SATURATION: 95 % | DIASTOLIC BLOOD PRESSURE: 92 MMHG | HEART RATE: 86 BPM | WEIGHT: 279 LBS | RESPIRATION RATE: 20 BRPM | BODY MASS INDEX: 40.03 KG/M2

## 2021-05-11 DIAGNOSIS — G47.00 INSOMNIA, UNSPECIFIED TYPE: Primary | ICD-10-CM

## 2021-05-11 PROCEDURE — 99214 PR OFFICE/OUTPT VISIT, EST, LEVL IV, 30-39 MIN: ICD-10-PCS | Mod: S$GLB,,, | Performed by: PHYSICIAN ASSISTANT

## 2021-05-11 PROCEDURE — 99214 OFFICE O/P EST MOD 30 MIN: CPT | Mod: S$GLB,,, | Performed by: PHYSICIAN ASSISTANT

## 2021-05-11 RX ORDER — TRAZODONE HYDROCHLORIDE 50 MG/1
50 TABLET ORAL NIGHTLY
Qty: 30 TABLET | Refills: 1 | Status: SHIPPED | OUTPATIENT
Start: 2021-05-11 | End: 2022-03-07

## 2021-05-26 ENCOUNTER — TELEPHONE (OUTPATIENT)
Dept: PAIN MEDICINE | Facility: CLINIC | Age: 66
End: 2021-05-26

## 2021-05-26 RX ORDER — CYCLOBENZAPRINE HCL 5 MG
5 TABLET ORAL 2 TIMES DAILY PRN
Qty: 40 TABLET | Refills: 0 | Status: SHIPPED | OUTPATIENT
Start: 2021-05-26 | End: 2022-03-03

## 2021-06-08 ENCOUNTER — OFFICE VISIT (OUTPATIENT)
Dept: FAMILY MEDICINE | Facility: CLINIC | Age: 66
End: 2021-06-08
Payer: MEDICARE

## 2021-06-08 VITALS
HEART RATE: 85 BPM | DIASTOLIC BLOOD PRESSURE: 88 MMHG | HEIGHT: 70 IN | RESPIRATION RATE: 20 BRPM | BODY MASS INDEX: 40.05 KG/M2 | TEMPERATURE: 98 F | SYSTOLIC BLOOD PRESSURE: 136 MMHG | WEIGHT: 279.75 LBS | OXYGEN SATURATION: 95 %

## 2021-06-08 DIAGNOSIS — G47.00 INSOMNIA, UNSPECIFIED TYPE: Primary | ICD-10-CM

## 2021-06-08 PROCEDURE — 99213 PR OFFICE/OUTPT VISIT, EST, LEVL III, 20-29 MIN: ICD-10-PCS | Mod: S$GLB,,, | Performed by: PHYSICIAN ASSISTANT

## 2021-06-08 PROCEDURE — 99213 OFFICE O/P EST LOW 20 MIN: CPT | Mod: S$GLB,,, | Performed by: PHYSICIAN ASSISTANT

## 2022-01-16 ENCOUNTER — OFFICE VISIT (OUTPATIENT)
Dept: FAMILY MEDICINE | Facility: CLINIC | Age: 67
End: 2022-01-16
Payer: MEDICARE

## 2022-01-16 VITALS
DIASTOLIC BLOOD PRESSURE: 80 MMHG | OXYGEN SATURATION: 89 % | TEMPERATURE: 99 F | BODY MASS INDEX: 37.69 KG/M2 | HEIGHT: 70 IN | SYSTOLIC BLOOD PRESSURE: 120 MMHG | WEIGHT: 263.25 LBS | HEART RATE: 102 BPM

## 2022-01-16 DIAGNOSIS — J18.9 PNEUMONIA OF BOTH LUNGS DUE TO INFECTIOUS ORGANISM, UNSPECIFIED PART OF LUNG: ICD-10-CM

## 2022-01-16 DIAGNOSIS — R09.02 HYPOXIA: ICD-10-CM

## 2022-01-16 DIAGNOSIS — R82.2 BILIRUBINURIA: ICD-10-CM

## 2022-01-16 DIAGNOSIS — R10.9 FLANK PAIN: Primary | ICD-10-CM

## 2022-01-16 LAB
BILIRUB SERPL-MCNC: ABNORMAL MG/DL
BLOOD URINE, POC: ABNORMAL
CLARITY, POC UA: ABNORMAL
COLOR, POC UA: ABNORMAL
GLUCOSE UR QL STRIP: NORMAL
KETONES UR QL STRIP: ABNORMAL
LEUKOCYTE ESTERASE URINE, POC: ABNORMAL
NITRITE, POC UA: ABNORMAL
PH, POC UA: 6
PROTEIN, POC: ABNORMAL
SPECIFIC GRAVITY, POC UA: 1.01
UROBILINOGEN, POC UA: 12

## 2022-01-16 PROCEDURE — 99214 PR OFFICE/OUTPT VISIT, EST, LEVL IV, 30-39 MIN: ICD-10-PCS | Mod: S$GLB,,, | Performed by: PHYSICIAN ASSISTANT

## 2022-01-16 PROCEDURE — 99214 OFFICE O/P EST MOD 30 MIN: CPT | Mod: S$GLB,,, | Performed by: PHYSICIAN ASSISTANT

## 2022-01-16 PROCEDURE — 81002 URINALYSIS NONAUTO W/O SCOPE: CPT | Mod: S$GLB,,, | Performed by: PHYSICIAN ASSISTANT

## 2022-01-16 PROCEDURE — 81002 POCT URINE DIPSTICK WITHOUT MICROSCOPE: ICD-10-PCS | Mod: S$GLB,,, | Performed by: PHYSICIAN ASSISTANT

## 2022-01-16 NOTE — PROGRESS NOTES
Subjective:       Patient ID: Bossman Duke is a 66 y.o. male       Chief Complaint: Urinary Tract Infection (Flank pain) and Cough (productive)    HPI  Patient's  PCP: Bossman Damon III, PA-C.  The patient is new to me  Patient's past medical history includes:  COPD, nicotine dependence, hypertension    The patient presents today CO dysuria and a cough. His cough began about 2 weeks ago.  He admits that he is feeling short of breath and having back and flank pain today.      Review of Systems   Constitutional: Positive for fatigue. Negative for chills and fever.   HENT: Positive for congestion. Negative for sore throat.    Eyes: Negative for visual disturbance.   Respiratory: Positive for cough, chest tightness and shortness of breath.    Cardiovascular: Negative for chest pain.   Gastrointestinal: Negative for diarrhea, nausea and vomiting.   Genitourinary: Positive for dysuria.   Musculoskeletal: Negative for arthralgias.   Skin: Negative for rash.   Neurological: Negative for headaches.   Psychiatric/Behavioral: Negative for sleep disturbance.        Objective:   Physical Exam  Constitutional:       General: He is not in acute distress.     Appearance: He is well-developed and well-nourished. He is obese.   HENT:      Head: Normocephalic and atraumatic.      Right Ear: External ear normal.      Left Ear: External ear normal.      Nose: Nose normal.      Mouth/Throat:      Mouth: Oropharynx is clear and moist.   Eyes:      Conjunctiva/sclera: Conjunctivae normal.      Pupils: Pupils are equal, round, and reactive to light.   Cardiovascular:      Rate and Rhythm: Normal rate and regular rhythm.      Heart sounds: Normal heart sounds. No murmur heard.      Pulmonary:      Effort: Pulmonary effort is normal. No respiratory distress.      Breath sounds: Wheezing and rhonchi (bilaterally) present.      Comments: Decreased breath sounds   Abdominal:      General: Bowel sounds are normal.      Palpations: Abdomen is  soft.      Tenderness: There is no abdominal tenderness.   Musculoskeletal:         General: Normal range of motion.      Cervical back: Normal range of motion and neck supple.   Skin:     General: Skin is warm and dry.      Findings: No rash.   Neurological:      Mental Status: He is alert and oriented to person, place, and time.   Psychiatric:         Mood and Affect: Mood and affect normal.         Behavior: Behavior normal.          Assessment:       1. Flank pain  POCT URINE DIPSTICK WITHOUT MICROSCOPE   2. Pneumonia of both lungs due to infectious organism, unspecified part of lung     3. Bilirubinuria     4. Hypoxia          Plan:       Flank pain  -     POCT URINE DIPSTICK WITHOUT MICROSCOPE    Pneumonia of both lungs due to infectious organism, unspecified part of lung    Bilirubinuria    Hypoxia      I recommended that the patient go to to ER now for further evaluation and treatment of his pneumonia and hypoxia.  He voiced understanding and agrees to go now.      Leisa Hanley PA-C   01/16/2022   1:13 PM

## 2022-03-03 ENCOUNTER — CLINICAL SUPPORT (OUTPATIENT)
Dept: FAMILY MEDICINE | Facility: CLINIC | Age: 67
End: 2022-03-03
Payer: MEDICARE

## 2022-03-03 ENCOUNTER — LAB VISIT (OUTPATIENT)
Dept: FAMILY MEDICINE | Facility: CLINIC | Age: 67
End: 2022-03-03
Payer: MEDICARE

## 2022-03-03 VITALS — SYSTOLIC BLOOD PRESSURE: 138 MMHG | DIASTOLIC BLOOD PRESSURE: 80 MMHG | HEART RATE: 68 BPM

## 2022-03-03 DIAGNOSIS — Z12.5 SCREENING FOR MALIGNANT NEOPLASM OF PROSTATE: ICD-10-CM

## 2022-03-03 DIAGNOSIS — I10 ESSENTIAL HYPERTENSION: Primary | ICD-10-CM

## 2022-03-03 DIAGNOSIS — Z01.30 BP CHECK: Primary | ICD-10-CM

## 2022-03-03 LAB
ALBUMIN SERPL BCP-MCNC: 3.5 G/DL (ref 3.5–5.2)
ALP SERPL-CCNC: 83 U/L (ref 55–135)
ALT SERPL W/O P-5'-P-CCNC: 36 U/L (ref 10–44)
ANION GAP SERPL CALC-SCNC: 13 MMOL/L (ref 8–16)
AST SERPL-CCNC: 24 U/L (ref 10–40)
BASOPHILS # BLD AUTO: 0.1 K/UL (ref 0–0.2)
BASOPHILS NFR BLD: 1 % (ref 0–1.9)
BILIRUB SERPL-MCNC: 0.5 MG/DL (ref 0.1–1)
BUN SERPL-MCNC: 10 MG/DL (ref 8–23)
CALCIUM SERPL-MCNC: 9.1 MG/DL (ref 8.7–10.5)
CHLORIDE SERPL-SCNC: 106 MMOL/L (ref 95–110)
CHOLEST SERPL-MCNC: 220 MG/DL (ref 120–199)
CHOLEST/HDLC SERPL: 2.9 {RATIO} (ref 2–5)
CO2 SERPL-SCNC: 26 MMOL/L (ref 23–29)
COMPLEXED PSA SERPL-MCNC: 2.9 NG/ML (ref 0–4)
CREAT SERPL-MCNC: 0.7 MG/DL (ref 0.5–1.4)
DIFFERENTIAL METHOD: ABNORMAL
EOSINOPHIL # BLD AUTO: 0.4 K/UL (ref 0–0.5)
EOSINOPHIL NFR BLD: 4 % (ref 0–8)
ERYTHROCYTE [DISTWIDTH] IN BLOOD BY AUTOMATED COUNT: 15.9 % (ref 11.5–14.5)
EST. GFR  (AFRICAN AMERICAN): >60 ML/MIN/1.73 M^2
EST. GFR  (NON AFRICAN AMERICAN): >60 ML/MIN/1.73 M^2
GLUCOSE SERPL-MCNC: 126 MG/DL (ref 70–110)
HCT VFR BLD AUTO: 43.6 % (ref 40–54)
HDLC SERPL-MCNC: 75 MG/DL (ref 40–75)
HDLC SERPL: 34.1 % (ref 20–50)
HGB BLD-MCNC: 14.2 G/DL (ref 14–18)
IMM GRANULOCYTES # BLD AUTO: 0.05 K/UL (ref 0–0.04)
IMM GRANULOCYTES NFR BLD AUTO: 0.5 % (ref 0–0.5)
LDLC SERPL CALC-MCNC: 133.4 MG/DL (ref 63–159)
LYMPHOCYTES # BLD AUTO: 3.4 K/UL (ref 1–4.8)
LYMPHOCYTES NFR BLD: 33.7 % (ref 18–48)
MCH RBC QN AUTO: 30.9 PG (ref 27–31)
MCHC RBC AUTO-ENTMCNC: 32.6 G/DL (ref 32–36)
MCV RBC AUTO: 95 FL (ref 82–98)
MONOCYTES # BLD AUTO: 1.1 K/UL (ref 0.3–1)
MONOCYTES NFR BLD: 10.5 % (ref 4–15)
NEUTROPHILS # BLD AUTO: 5.1 K/UL (ref 1.8–7.7)
NEUTROPHILS NFR BLD: 50.3 % (ref 38–73)
NONHDLC SERPL-MCNC: 145 MG/DL
NRBC BLD-RTO: 0 /100 WBC
PLATELET # BLD AUTO: 224 K/UL (ref 150–450)
PMV BLD AUTO: 13.1 FL (ref 9.2–12.9)
POTASSIUM SERPL-SCNC: 4.4 MMOL/L (ref 3.5–5.1)
PROT SERPL-MCNC: 6.9 G/DL (ref 6–8.4)
RBC # BLD AUTO: 4.6 M/UL (ref 4.6–6.2)
SODIUM SERPL-SCNC: 145 MMOL/L (ref 136–145)
TRIGL SERPL-MCNC: 58 MG/DL (ref 30–150)
WBC # BLD AUTO: 10.21 K/UL (ref 3.9–12.7)

## 2022-03-03 PROCEDURE — 80053 COMPREHEN METABOLIC PANEL: CPT | Performed by: PHYSICIAN ASSISTANT

## 2022-03-03 PROCEDURE — 85025 COMPLETE CBC W/AUTO DIFF WBC: CPT | Performed by: PHYSICIAN ASSISTANT

## 2022-03-03 PROCEDURE — 84153 ASSAY OF PSA TOTAL: CPT | Performed by: PHYSICIAN ASSISTANT

## 2022-03-03 PROCEDURE — 80061 LIPID PANEL: CPT | Performed by: PHYSICIAN ASSISTANT

## 2022-03-03 RX ORDER — DILTIAZEM HYDROCHLORIDE 240 MG/1
240 CAPSULE, EXTENDED RELEASE ORAL DAILY
COMMUNITY
Start: 2022-02-15 | End: 2022-06-28 | Stop reason: DRUGHIGH

## 2022-03-03 RX ORDER — FAMOTIDINE 20 MG/1
20 TABLET, FILM COATED ORAL
COMMUNITY
Start: 2022-02-25 | End: 2023-07-26

## 2022-03-03 NOTE — PROGRESS NOTES
,      Blood pressure reading after 15 minutes was 138/80, Pulse 68.  Bossman Damon III, PA-C notified.    Bossman Duke 66 y.o. male is here today for Blood Pressure check.   History of HTN yes.    Review of patient's allergies indicates:   Allergen Reactions    Benicar [olmesartan] Other (See Comments)     Gave him severe fatigue and muscle cramps.     Lisinopril Other (See Comments)     Fatigue and insomnia    Metoprolol Shortness Of Breath     Creatinine   Date Value Ref Range Status   01/21/2021 0.8 0.5 - 1.4 mg/dL Final     Sodium   Date Value Ref Range Status   01/21/2021 133 (L) 136 - 145 mmol/L Final     Potassium   Date Value Ref Range Status   01/21/2021 4.3 3.5 - 5.1 mmol/L Final   ]  Patient verifies taking blood pressure medications on a regular basis at the same time of the day.     Current Outpatient Medications:     aspirin (ECOTRIN) 81 MG EC tablet, Take 81 mg by mouth once daily., Disp: , Rfl:     diltiaZEM (TIAZAC) 360 MG Cs24, Take 360 mg by mouth once daily., Disp: , Rfl:     famotidine (PEPCID) 20 MG tablet, Take 20 mg by mouth 2 (two) times daily., Disp: , Rfl:     fluticasone furoate-vilanteroL (BREO ELLIPTA) 200-25 mcg/dose DsDv diskus inhaler, Inhale 1 puff into the lungs once daily. Controller, Disp: 1 each, Rfl: 5    fluticasone-umeclidin-vilanter (TRELEGY ELLIPTA) 100-62.5-25 mcg DsDv, Inhale 1 puff into the lungs once daily., Disp: 60 each, Rfl: 5    multivit-mins no.63/iron/folic (M-VIT ORAL), Take 1 tablet by mouth once daily., Disp: , Rfl:     rivaroxaban (XARELTO) 20 mg Tab, Take 20 mg by mouth daily with dinner or evening meal., Disp: , Rfl:     traZODone (DESYREL) 50 MG tablet, Take 1 tablet (50 mg total) by mouth every evening., Disp: 30 tablet, Rfl: 1    valsartan (DIOVAN) 320 MG tablet, Take 1 tablet (320 mg total) by mouth once daily., Disp: 90 tablet, Rfl: 1  Does patient have record of home blood pressure readings no. Readings have been averaging n/a.   Last  dose of blood pressure medication was taken at 7:30a-8a today 3/3/22.  Patient is asymptomatic.   Complains of n/a.

## 2022-03-03 NOTE — PROGRESS NOTES
Venipuncture performed with 23 gauge butterfly, x's 1 attempt.  Successful venipuncture to R Antecubital vein.  Specimens collected per orders.      Pressure dressing applied to site, instructed patient to remove dressing in 10-15 minutes, OK to re-adjust dressing if pressure causing any discomfort, to observe closely for numbness and/or discoloration to hand or fingers, and to notify provider if bleeding persists after applying constant pressure lasting 30 minutes.

## 2022-03-07 ENCOUNTER — OFFICE VISIT (OUTPATIENT)
Dept: FAMILY MEDICINE | Facility: CLINIC | Age: 67
End: 2022-03-07
Payer: MEDICARE

## 2022-03-07 VITALS
HEIGHT: 70 IN | HEART RATE: 72 BPM | SYSTOLIC BLOOD PRESSURE: 140 MMHG | BODY MASS INDEX: 39.58 KG/M2 | WEIGHT: 276.44 LBS | OXYGEN SATURATION: 95 % | DIASTOLIC BLOOD PRESSURE: 88 MMHG

## 2022-03-07 DIAGNOSIS — I48.91 ATRIAL FIBRILLATION, UNSPECIFIED TYPE: ICD-10-CM

## 2022-03-07 DIAGNOSIS — J44.9 CHRONIC OBSTRUCTIVE PULMONARY DISEASE, UNSPECIFIED COPD TYPE: ICD-10-CM

## 2022-03-07 DIAGNOSIS — R73.09 ELEVATED GLUCOSE LEVEL: ICD-10-CM

## 2022-03-07 DIAGNOSIS — Z12.11 SCREENING FOR MALIGNANT NEOPLASM OF COLON: ICD-10-CM

## 2022-03-07 DIAGNOSIS — I10 ESSENTIAL HYPERTENSION: Primary | ICD-10-CM

## 2022-03-07 PROCEDURE — 3079F PR MOST RECENT DIASTOLIC BLOOD PRESSURE 80-89 MM HG: ICD-10-PCS | Mod: CPTII,S$GLB,, | Performed by: PHYSICIAN ASSISTANT

## 2022-03-07 PROCEDURE — 3288F FALL RISK ASSESSMENT DOCD: CPT | Mod: CPTII,S$GLB,, | Performed by: PHYSICIAN ASSISTANT

## 2022-03-07 PROCEDURE — 1159F MED LIST DOCD IN RCRD: CPT | Mod: CPTII,S$GLB,, | Performed by: PHYSICIAN ASSISTANT

## 2022-03-07 PROCEDURE — 36415 COLL VENOUS BLD VENIPUNCTURE: CPT | Mod: S$GLB,,, | Performed by: PHYSICIAN ASSISTANT

## 2022-03-07 PROCEDURE — 1101F PT FALLS ASSESS-DOCD LE1/YR: CPT | Mod: CPTII,S$GLB,, | Performed by: PHYSICIAN ASSISTANT

## 2022-03-07 PROCEDURE — 83036 HEMOGLOBIN GLYCOSYLATED A1C: CPT | Performed by: PHYSICIAN ASSISTANT

## 2022-03-07 PROCEDURE — 1160F PR REVIEW ALL MEDS BY PRESCRIBER/CLIN PHARMACIST DOCUMENTED: ICD-10-PCS | Mod: CPTII,S$GLB,, | Performed by: PHYSICIAN ASSISTANT

## 2022-03-07 PROCEDURE — 99214 OFFICE O/P EST MOD 30 MIN: CPT | Mod: S$GLB,,, | Performed by: PHYSICIAN ASSISTANT

## 2022-03-07 PROCEDURE — 99499 RISK ADDL DX/OHS AUDIT: ICD-10-PCS | Mod: HCNC,S$GLB,, | Performed by: PHYSICIAN ASSISTANT

## 2022-03-07 PROCEDURE — 3077F PR MOST RECENT SYSTOLIC BLOOD PRESSURE >= 140 MM HG: ICD-10-PCS | Mod: CPTII,S$GLB,, | Performed by: PHYSICIAN ASSISTANT

## 2022-03-07 PROCEDURE — 1126F AMNT PAIN NOTED NONE PRSNT: CPT | Mod: CPTII,S$GLB,, | Performed by: PHYSICIAN ASSISTANT

## 2022-03-07 PROCEDURE — 99499 UNLISTED E&M SERVICE: CPT | Mod: HCNC,S$GLB,, | Performed by: PHYSICIAN ASSISTANT

## 2022-03-07 PROCEDURE — 99214 PR OFFICE/OUTPT VISIT, EST, LEVL IV, 30-39 MIN: ICD-10-PCS | Mod: S$GLB,,, | Performed by: PHYSICIAN ASSISTANT

## 2022-03-07 PROCEDURE — 1159F PR MEDICATION LIST DOCUMENTED IN MEDICAL RECORD: ICD-10-PCS | Mod: CPTII,S$GLB,, | Performed by: PHYSICIAN ASSISTANT

## 2022-03-07 PROCEDURE — 3288F PR FALLS RISK ASSESSMENT DOCUMENTED: ICD-10-PCS | Mod: CPTII,S$GLB,, | Performed by: PHYSICIAN ASSISTANT

## 2022-03-07 PROCEDURE — 1126F PR PAIN SEVERITY QUANTIFIED, NO PAIN PRESENT: ICD-10-PCS | Mod: CPTII,S$GLB,, | Performed by: PHYSICIAN ASSISTANT

## 2022-03-07 PROCEDURE — 1160F RVW MEDS BY RX/DR IN RCRD: CPT | Mod: CPTII,S$GLB,, | Performed by: PHYSICIAN ASSISTANT

## 2022-03-07 PROCEDURE — 3008F PR BODY MASS INDEX (BMI) DOCUMENTED: ICD-10-PCS | Mod: CPTII,S$GLB,, | Performed by: PHYSICIAN ASSISTANT

## 2022-03-07 PROCEDURE — 4010F PR ACE/ARB THEARPY RXD/TAKEN: ICD-10-PCS | Mod: CPTII,S$GLB,, | Performed by: PHYSICIAN ASSISTANT

## 2022-03-07 PROCEDURE — 3077F SYST BP >= 140 MM HG: CPT | Mod: CPTII,S$GLB,, | Performed by: PHYSICIAN ASSISTANT

## 2022-03-07 PROCEDURE — 1101F PR PT FALLS ASSESS DOC 0-1 FALLS W/OUT INJ PAST YR: ICD-10-PCS | Mod: CPTII,S$GLB,, | Performed by: PHYSICIAN ASSISTANT

## 2022-03-07 PROCEDURE — 4010F ACE/ARB THERAPY RXD/TAKEN: CPT | Mod: CPTII,S$GLB,, | Performed by: PHYSICIAN ASSISTANT

## 2022-03-07 PROCEDURE — 3079F DIAST BP 80-89 MM HG: CPT | Mod: CPTII,S$GLB,, | Performed by: PHYSICIAN ASSISTANT

## 2022-03-07 PROCEDURE — 3008F BODY MASS INDEX DOCD: CPT | Mod: CPTII,S$GLB,, | Performed by: PHYSICIAN ASSISTANT

## 2022-03-07 PROCEDURE — 36415 PR COLLECTION VENOUS BLOOD,VENIPUNCTURE: ICD-10-PCS | Mod: S$GLB,,, | Performed by: PHYSICIAN ASSISTANT

## 2022-03-07 NOTE — PROGRESS NOTES
"Subjective:       Patient ID: Bossman Duke is a 66 y.o. male.    Chief Complaint: Annual Exam (Lab results)    Patient is a 67 yo male well known to me. He states that he was recently admitted to [facility] for pneumonia and atrial fib.     Patient Active Problem List:     Tobacco use disorder: has quit smoking        Seasonal allergies: currently stable       Essential hypertension: not controlled today       SOB (shortness of breath)     PAD (peripheral artery disease)     Lumbar spondylosis: Has plan to see a Chiropractor. Declines PT today       COPD (chronic obstructive pulmonary disease): Followed by pulmonary. However he recently stopped his medication because it was too expensive. He has plans to follow up with them        Atrial fibrillation: followed by Dr Gomez.     Past Medical History:  No date: Anticoagulant long-term use  No date: Arthritis  No date: COPD (chronic obstructive pulmonary disease)  No date: Hypertension  No date: PAD (peripheral artery disease)  No date: PUD (peptic ulcer disease)    Past Surgical History:  No date: APPENDECTOMY  No date: CERVICAL SPINE SURGERY  No date: EYE SURGERY; Left      Comment:  "as  a child"  2/9/2021: INJECTION OF ANESTHETIC AGENT AROUND MEDIAL BRANCH NERVES   INNERVATING LUMBAR FACET JOINT; Bilateral      Comment:  Procedure: Block-nerve-medial branch-lumbar L3/4, L4/5,                L5/S1;  Surgeon: Linwood Aponte MD;  Location: [facility] OR;                Service: Pain Management;  Laterality: Bilateral;  No date: LUMBAR DISC SURGERY  2/15/2021: PERCUTANEOUS TRANSLUMINAL ANGIOPLASTY; N/A      Comment:  Procedure: Pta (Angioplasty, Percutaneous,                Transluminal);  Surgeon: Jl Neil MD;                 Location: [facility] CATH;  Service: Cardiology;  Laterality:                N/A;  3/1/2021: RADIOFREQUENCY ABLATION; Right      Comment:  Procedure: Radiofrequency Ablation L3/4, L4/5;  Surgeon:               Linwood Aponte MD; "  Location: [facility] OR;  Service: Pain                Management;  Laterality: Right;  3/10/2021: RADIOFREQUENCY ABLATION; Left      Comment:  Procedure: Radiofrequency Ablation L3/4, L4/5;  Surgeon:               Linwood Aponte MD;  Location: [facility] OR;  Service: Pain                Management;  Laterality: Left;    Review of patient's family history indicates:  Problem: Diabetes      Relation: Mother          Age of Onset: (Not Specified)  Problem: Heart disease      Relation: Mother          Age of Onset: (Not Specified)  Problem: Heart disease      Relation: Father          Age of Onset: (Not Specified)      Social History    Socioeconomic History      Marital status:     Tobacco Use      Smoking status: Former Smoker        Packs/day: 0.25      Smokeless tobacco: Never Used      Tobacco comment: Quit about 2 months ago in Feb 2021    Substance and Sexual Activity      Alcohol use: Yes        Alcohol/week: 12.0 standard drinks        Types: 12 Cans of beer per week        Comment: 12 beers per week      Drug use: Never      Review of patient's allergies indicates:   -- Benicar (olmesartan) -- Other (See Comments)    --  Gave him severe fatigue and muscle cramps.   -- Lisinopril -- Other (See Comments)    --  Fatigue and insomnia   -- Metoprolol -- Shortness Of Breath    Current Outpatient Medications:   aspirin (ECOTRIN) 81 MG EC tablet, Take 81 mg by mouth once daily., Disp: , Rfl:   diltiaZEM (TIAZAC) 360 MG Cs24, Take 360 mg by mouth once daily., Disp: , Rfl:   famotidine (PEPCID) 20 MG tablet, Take 20 mg by mouth 2 (two) times daily., Disp: , Rfl:   multivit-mins no.63/iron/folic (M-VIT ORAL), Take 1 tablet by mouth once daily., Disp: , Rfl:   rivaroxaban (XARELTO) 20 mg Tab, Take 20 mg by mouth daily with dinner or evening meal., Disp: , Rfl:   valsartan (DIOVAN) 320 MG tablet, Take 1 tablet (320 mg total) by mouth once daily., Disp: 90 tablet, Rfl: 1    BP (!) 140/88 (BP Location: Left arm, Patient  "Position: Sitting, BP Method: Large (Manual))   Pulse 72   Ht 5' 10" (1.778 m)   Wt 125.4 kg (276 lb 7.3 oz)   SpO2 95%   BMI 39.67 kg/m²         Review of Systems   Constitutional: Negative for activity change, appetite change, fatigue and fever.   Respiratory: Negative for chest tightness and shortness of breath.    Cardiovascular: Positive for leg swelling. Negative for chest pain.   Gastrointestinal: Negative for abdominal pain.   Genitourinary: Negative.          Objective:      Physical Exam  Constitutional:       General: He is not in acute distress.     Appearance: Normal appearance. He is not ill-appearing, toxic-appearing or diaphoretic.   HENT:      Head: Normocephalic and atraumatic.   Cardiovascular:      Rate and Rhythm: Normal rate. Rhythm irregular.      Pulses: Normal pulses.      Heart sounds: Normal heart sounds. No murmur heard.    No friction rub. No gallop.   Pulmonary:      Effort: Pulmonary effort is normal. No respiratory distress.      Breath sounds: Normal breath sounds. No stridor. No wheezing, rhonchi or rales.   Chest:      Chest wall: No tenderness.   Abdominal:      Tenderness: There is no abdominal tenderness.      Hernia: No hernia is present.   Musculoskeletal:         General: Swelling present.   Neurological:      General: No focal deficit present.      Mental Status: He is alert.   Psychiatric:         Mood and Affect: Mood normal.         Lab Results   Component Value Date    WBC 10.21 03/03/2022    HGB 14.2 03/03/2022    HCT 43.6 03/03/2022    MCV 95 03/03/2022     03/03/2022     CMP  Sodium   Date Value Ref Range Status   03/03/2022 145 136 - 145 mmol/L Final     Potassium   Date Value Ref Range Status   03/03/2022 4.4 3.5 - 5.1 mmol/L Final     Chloride   Date Value Ref Range Status   03/03/2022 106 95 - 110 mmol/L Final     CO2   Date Value Ref Range Status   03/03/2022 26 23 - 29 mmol/L Final     Glucose   Date Value Ref Range Status   03/03/2022 126 (H) 70 - " 110 mg/dL Final     BUN   Date Value Ref Range Status   03/03/2022 10 8 - 23 mg/dL Final     Creatinine   Date Value Ref Range Status   03/03/2022 0.7 0.5 - 1.4 mg/dL Final     Calcium   Date Value Ref Range Status   03/03/2022 9.1 8.7 - 10.5 mg/dL Final     Total Protein   Date Value Ref Range Status   03/03/2022 6.9 6.0 - 8.4 g/dL Final     Albumin   Date Value Ref Range Status   03/03/2022 3.5 3.5 - 5.2 g/dL Final     Total Bilirubin   Date Value Ref Range Status   03/03/2022 0.5 0.1 - 1.0 mg/dL Final     Comment:     For infants and newborns, interpretation of results should be based  on gestational age, weight and in agreement with clinical  observations.    Premature Infant recommended reference ranges:  Up to 24 hours.............<8.0 mg/dL  Up to 48 hours............<12.0 mg/dL  3-5 days..................<15.0 mg/dL  6-29 days.................<15.0 mg/dL       Alkaline Phosphatase   Date Value Ref Range Status   03/03/2022 83 55 - 135 U/L Final     AST   Date Value Ref Range Status   03/03/2022 24 10 - 40 U/L Final     ALT   Date Value Ref Range Status   03/03/2022 36 10 - 44 U/L Final     Anion Gap   Date Value Ref Range Status   03/03/2022 13 8 - 16 mmol/L Final     eGFR if    Date Value Ref Range Status   03/03/2022 >60.0 >60 mL/min/1.73 m^2 Final     eGFR if non    Date Value Ref Range Status   03/03/2022 >60.0 >60 mL/min/1.73 m^2 Final     Comment:     Calculation used to obtain the estimated glomerular filtration  rate (eGFR) is the CKD-EPI equation.        Lab Results   Component Value Date    CHOL 220 (H) 03/03/2022     Lab Results   Component Value Date    HDL 75 03/03/2022     Lab Results   Component Value Date    LDLCALC 133.4 03/03/2022     Lab Results   Component Value Date    TRIG 58 03/03/2022     Lab Results   Component Value Date    CHOLHDL 34.1 03/03/2022     No results found for: LABA1C, HGBA1C  Assessment:       Problem List Items Addressed This Visit      Essential hypertension - Primary    COPD (chronic obstructive pulmonary disease)    Atrial fibrillation      Other Visit Diagnoses     Elevated glucose level        Relevant Orders    Hemoglobin A1C    Screening for malignant neoplasm of colon        Relevant Orders    Cologuard Screening (Multitarget Stool DNA)          Plan:       Essential hypertension  This problem is currently not controlled. Please follow up with your PCP as planned to discuss adjustments to your treatment plan.  The patient is asked to make an attempt to improve diet and exercise patterns to aid in medical management of this problem.    Atrial fibrillation, unspecified type  The current medical regimen is effective;  continue present plan and medications.    Chronic obstructive pulmonary disease, unspecified COPD type  Recommend to follow up with PCP    Elevated glucose level  -     Hemoglobin A1C; Future; Expected date: 03/07/2022    Screening for malignant neoplasm of colon  -     Cologuard Screening (Multitarget Stool DNA); Future; Expected date: 03/07/2022

## 2022-03-08 ENCOUNTER — TELEPHONE (OUTPATIENT)
Dept: FAMILY MEDICINE | Facility: CLINIC | Age: 67
End: 2022-03-08
Payer: MEDICARE

## 2022-03-08 LAB
ESTIMATED AVG GLUCOSE: 123 MG/DL (ref 68–131)
HBA1C MFR BLD: 5.9 % (ref 4–5.6)

## 2022-03-08 NOTE — TELEPHONE ENCOUNTER
----- Message from Bossman Damon III, PA-C sent at 3/8/2022  7:08 AM CST -----  Bossman Duke    The blood test result is consistent with prediabetes. Recommend a low sugar diet, daily exercise and weight loss. Will have to start medication in the the next 3 to 6 months if the numbers increase.

## 2022-03-08 NOTE — TELEPHONE ENCOUNTER
----- Message from Arlene Montano sent at 3/8/2022  1:38 PM CST -----  Patient Returning Call    Who Called:PT     Who Left Message for Patient: Mallorie    Does the patient know what this is regarding?: blood test     Best Call Back Number:[phone]

## 2022-03-15 ENCOUNTER — TELEPHONE (OUTPATIENT)
Dept: OPHTHALMOLOGY | Facility: CLINIC | Age: 67
End: 2022-03-15
Payer: MEDICARE

## 2022-03-20 ENCOUNTER — TELEPHONE (OUTPATIENT)
Dept: FAMILY MEDICINE | Facility: CLINIC | Age: 67
End: 2022-03-20
Payer: MEDICARE

## 2022-03-21 ENCOUNTER — PATIENT OUTREACH (OUTPATIENT)
Dept: ADMINISTRATIVE | Facility: OTHER | Age: 67
End: 2022-03-21
Payer: MEDICARE

## 2022-03-21 NOTE — PROGRESS NOTES
LINKS immunization registry updated  Care Everywhere updated  Health Maintenance updated  Chart reviewed for overdue Proactive [facility] Encounters ([facility]) health maintenance testing (CRS, Breast Ca, Diabetic Eye Exam)   Orders entered:N/A

## 2022-03-24 ENCOUNTER — OFFICE VISIT (OUTPATIENT)
Dept: CARDIOLOGY | Facility: CLINIC | Age: 67
End: 2022-03-24
Payer: MEDICARE

## 2022-03-24 VITALS
DIASTOLIC BLOOD PRESSURE: 86 MMHG | WEIGHT: 278.44 LBS | BODY MASS INDEX: 39.86 KG/M2 | HEIGHT: 70 IN | SYSTOLIC BLOOD PRESSURE: 158 MMHG | HEART RATE: 93 BPM

## 2022-03-24 DIAGNOSIS — I48.91 ATRIAL FIBRILLATION, UNSPECIFIED TYPE: Primary | ICD-10-CM

## 2022-03-24 DIAGNOSIS — J44.9 CHRONIC OBSTRUCTIVE PULMONARY DISEASE, UNSPECIFIED COPD TYPE: ICD-10-CM

## 2022-03-24 DIAGNOSIS — I73.9 PAD (PERIPHERAL ARTERY DISEASE): ICD-10-CM

## 2022-03-24 DIAGNOSIS — F17.200 TOBACCO USE DISORDER: ICD-10-CM

## 2022-03-24 PROCEDURE — 93005 ELECTROCARDIOGRAM TRACING: CPT | Mod: PO

## 2022-03-24 PROCEDURE — 1160F PR REVIEW ALL MEDS BY PRESCRIBER/CLIN PHARMACIST DOCUMENTED: ICD-10-PCS | Mod: CPTII,S$GLB,, | Performed by: PHYSICIAN ASSISTANT

## 2022-03-24 PROCEDURE — 3079F PR MOST RECENT DIASTOLIC BLOOD PRESSURE 80-89 MM HG: ICD-10-PCS | Mod: CPTII,S$GLB,, | Performed by: PHYSICIAN ASSISTANT

## 2022-03-24 PROCEDURE — 1126F AMNT PAIN NOTED NONE PRSNT: CPT | Mod: CPTII,S$GLB,, | Performed by: PHYSICIAN ASSISTANT

## 2022-03-24 PROCEDURE — 99999 PR PBB SHADOW E&M-EST. PATIENT-LVL III: CPT | Mod: PBBFAC,,, | Performed by: PHYSICIAN ASSISTANT

## 2022-03-24 PROCEDURE — 93010 ELECTROCARDIOGRAM REPORT: CPT | Mod: S$GLB,,, | Performed by: INTERNAL MEDICINE

## 2022-03-24 PROCEDURE — 3008F PR BODY MASS INDEX (BMI) DOCUMENTED: ICD-10-PCS | Mod: CPTII,S$GLB,, | Performed by: PHYSICIAN ASSISTANT

## 2022-03-24 PROCEDURE — 1160F RVW MEDS BY RX/DR IN RCRD: CPT | Mod: CPTII,S$GLB,, | Performed by: PHYSICIAN ASSISTANT

## 2022-03-24 PROCEDURE — 99214 OFFICE O/P EST MOD 30 MIN: CPT | Mod: S$GLB,,, | Performed by: PHYSICIAN ASSISTANT

## 2022-03-24 PROCEDURE — 99999 PR PBB SHADOW E&M-EST. PATIENT-LVL III: ICD-10-PCS | Mod: PBBFAC,,, | Performed by: PHYSICIAN ASSISTANT

## 2022-03-24 PROCEDURE — 1159F PR MEDICATION LIST DOCUMENTED IN MEDICAL RECORD: ICD-10-PCS | Mod: CPTII,S$GLB,, | Performed by: PHYSICIAN ASSISTANT

## 2022-03-24 PROCEDURE — 1126F PR PAIN SEVERITY QUANTIFIED, NO PAIN PRESENT: ICD-10-PCS | Mod: CPTII,S$GLB,, | Performed by: PHYSICIAN ASSISTANT

## 2022-03-24 PROCEDURE — 1159F MED LIST DOCD IN RCRD: CPT | Mod: CPTII,S$GLB,, | Performed by: PHYSICIAN ASSISTANT

## 2022-03-24 PROCEDURE — 93010 EKG 12-LEAD: ICD-10-PCS | Mod: S$GLB,,, | Performed by: INTERNAL MEDICINE

## 2022-03-24 PROCEDURE — 99214 PR OFFICE/OUTPT VISIT, EST, LEVL IV, 30-39 MIN: ICD-10-PCS | Mod: S$GLB,,, | Performed by: PHYSICIAN ASSISTANT

## 2022-03-24 PROCEDURE — 3077F PR MOST RECENT SYSTOLIC BLOOD PRESSURE >= 140 MM HG: ICD-10-PCS | Mod: CPTII,S$GLB,, | Performed by: PHYSICIAN ASSISTANT

## 2022-03-24 PROCEDURE — 3008F BODY MASS INDEX DOCD: CPT | Mod: CPTII,S$GLB,, | Performed by: PHYSICIAN ASSISTANT

## 2022-03-24 PROCEDURE — 3044F HG A1C LEVEL LT 7.0%: CPT | Mod: CPTII,S$GLB,, | Performed by: PHYSICIAN ASSISTANT

## 2022-03-24 PROCEDURE — 3044F PR MOST RECENT HEMOGLOBIN A1C LEVEL <7.0%: ICD-10-PCS | Mod: CPTII,S$GLB,, | Performed by: PHYSICIAN ASSISTANT

## 2022-03-24 PROCEDURE — 4010F PR ACE/ARB THEARPY RXD/TAKEN: ICD-10-PCS | Mod: CPTII,S$GLB,, | Performed by: PHYSICIAN ASSISTANT

## 2022-03-24 PROCEDURE — 4010F ACE/ARB THERAPY RXD/TAKEN: CPT | Mod: CPTII,S$GLB,, | Performed by: PHYSICIAN ASSISTANT

## 2022-03-24 PROCEDURE — 3079F DIAST BP 80-89 MM HG: CPT | Mod: CPTII,S$GLB,, | Performed by: PHYSICIAN ASSISTANT

## 2022-03-24 PROCEDURE — 3077F SYST BP >= 140 MM HG: CPT | Mod: CPTII,S$GLB,, | Performed by: PHYSICIAN ASSISTANT

## 2022-03-24 RX ORDER — PNV NO.95/FERROUS FUM/FOLIC AC 28MG-0.8MG
100 TABLET ORAL DAILY
COMMUNITY
End: 2023-07-26

## 2022-03-24 RX ORDER — CLOPIDOGREL BISULFATE 75 MG/1
75 TABLET ORAL DAILY
COMMUNITY
End: 2022-04-16

## 2022-03-24 NOTE — PROGRESS NOTES
Subjective:    Patient ID:  Bossman Duke is a 66 y.o. male who presents for follow-up of       HPI  Mr. Duke is a very pleasant gentleman who follows with Dr. Neil. He presents today for follow up after a hospitalization at [facility] with PNA. He states he also had a-fib during that hospitalization. I do not have records from that hospitalization at this time.      He was discharged on Xarelto and Cardizem, but he started bleeding from his gums while brushing his teeth and having BRBPR, so he stopped the Xarelto and went back to Plavix. He also resumed his nifedipine, but when he takes the Cardizem and the nifedipine together, his BP gets too low, so he stopped the Cardizem.     Review of Systems   Constitutional: Negative for chills, diaphoresis, fever, weight gain and weight loss.   HENT: Negative for sore throat.    Eyes: Negative for blurred vision, vision loss in left eye, vision loss in right eye and visual disturbance.   Cardiovascular: Negative for chest pain, claudication, dyspnea on exertion, leg swelling, near-syncope, orthopnea, palpitations, paroxysmal nocturnal dyspnea and syncope.   Respiratory: Negative for cough, hemoptysis, shortness of breath, sputum production and wheezing.    Endocrine: Negative for cold intolerance and heat intolerance.   Hematologic/Lymphatic: Negative for adenopathy. Does not bruise/bleed easily.   Skin: Negative for rash.   Musculoskeletal: Negative for falls, muscle weakness and myalgias.   Gastrointestinal: Negative for abdominal pain, change in bowel habit, constipation, diarrhea, melena and nausea.   Genitourinary: Negative for bladder incontinence.   Neurological: Negative for dizziness, focal weakness, headaches, light-headedness, numbness and weakness.   Psychiatric/Behavioral: Negative for altered mental status.         Vitals:    03/24/22 1507   BP: (!) 158/86   BP Location: Left arm   Patient Position: Sitting   BP Method: Large (Automatic)  "  Pulse: 93   Weight: 126.3 kg (278 lb 7.1 oz)   Height: 5' 10" (1.778 m)   Body mass index is 39.95 kg/m².    Objective:    Physical Exam  Constitutional:       Appearance: He is well-developed.   HENT:      Head: Normocephalic and atraumatic.   Eyes:      Extraocular Movements: Extraocular movements intact.      Pupils: Pupils are equal, round, and reactive to light.   Neck:      Thyroid: No thyromegaly.      Vascular: No JVD.      Trachea: No tracheal deviation.   Cardiovascular:      Rate and Rhythm: Normal rate and regular rhythm.      Chest Wall: PMI is not displaced.      Pulses: Normal pulses and intact distal pulses.      Heart sounds: S1 normal and S2 normal. No murmur heard.    No friction rub. No gallop.   Pulmonary:      Effort: Pulmonary effort is normal. No respiratory distress.      Breath sounds: Normal breath sounds. No wheezing or rales.   Chest:      Chest wall: No tenderness.   Abdominal:      General: Bowel sounds are normal. There is no distension.      Palpations: Abdomen is soft. There is no mass.      Tenderness: There is no abdominal tenderness.   Musculoskeletal:         General: No tenderness. Normal range of motion.      Cervical back: Neck supple.   Skin:     General: Skin is warm and dry.      Findings: No rash.   Neurological:      General: No focal deficit present.      Mental Status: He is alert and oriented to person, place, and time.   Psychiatric:         Mood and Affect: Mood normal.         Behavior: Behavior normal.     EKG: NSR with PACs      Assessment:       Problem List Items Addressed This Visit        Cardiology Problems    Atrial fibrillation - Primary    Relevant Orders    IN OFFICE EKG 12-LEAD (to Muse)    Cardiac event monitor    Echo    PAD (peripheral artery disease)       Other    COPD (chronic obstructive pulmonary disease)    Tobacco use disorder           Plan:       ? A-fib in setting of PNA. Will obtain records from [place].   Echo to evaluate for " structural heart disease.   Event monitor to evaluate for a-fib.   D/C Nifedipine, resume Cardizem 360mg daily.   Monitor BP and HR.   Continue other cardiac medications.   We discussed diet and exercise.   LDL not at goal -- may consider addition of statin in the future.   Encouraged smoking cessation.   F/U with Dr. Neil in 4 months.

## 2022-03-30 ENCOUNTER — OFFICE VISIT (OUTPATIENT)
Dept: OPTOMETRY | Facility: CLINIC | Age: 67
End: 2022-03-30
Payer: MEDICARE

## 2022-03-30 DIAGNOSIS — H54.40 BLINDNESS OF LEFT EYE WITH NORMAL VISION IN CONTRALATERAL EYE: ICD-10-CM

## 2022-03-30 DIAGNOSIS — H25.11 NUCLEAR SCLEROSIS OF RIGHT EYE: Primary | ICD-10-CM

## 2022-03-30 DIAGNOSIS — H52.7 REFRACTIVE ERROR: ICD-10-CM

## 2022-03-30 PROCEDURE — 1126F PR PAIN SEVERITY QUANTIFIED, NO PAIN PRESENT: ICD-10-PCS | Mod: CPTII,S$GLB,, | Performed by: OPTOMETRIST

## 2022-03-30 PROCEDURE — 1159F PR MEDICATION LIST DOCUMENTED IN MEDICAL RECORD: ICD-10-PCS | Mod: CPTII,S$GLB,, | Performed by: OPTOMETRIST

## 2022-03-30 PROCEDURE — 92004 PR EYE EXAM, NEW PATIENT,COMPREHESV: ICD-10-PCS | Mod: S$GLB,,, | Performed by: OPTOMETRIST

## 2022-03-30 PROCEDURE — 99999 PR PBB SHADOW E&M-EST. PATIENT-LVL III: ICD-10-PCS | Mod: PBBFAC,,, | Performed by: OPTOMETRIST

## 2022-03-30 PROCEDURE — 99999 PR PBB SHADOW E&M-EST. PATIENT-LVL III: CPT | Mod: PBBFAC,,, | Performed by: OPTOMETRIST

## 2022-03-30 PROCEDURE — 1100F PR PT FALLS ASSESS DOC 2+ FALLS/FALL W/INJURY/YR: ICD-10-PCS | Mod: CPTII,S$GLB,, | Performed by: OPTOMETRIST

## 2022-03-30 PROCEDURE — 92015 DETERMINE REFRACTIVE STATE: CPT | Mod: S$GLB,,, | Performed by: OPTOMETRIST

## 2022-03-30 PROCEDURE — 92015 PR REFRACTION: ICD-10-PCS | Mod: S$GLB,,, | Performed by: OPTOMETRIST

## 2022-03-30 PROCEDURE — 4010F ACE/ARB THERAPY RXD/TAKEN: CPT | Mod: CPTII,S$GLB,, | Performed by: OPTOMETRIST

## 2022-03-30 PROCEDURE — 92004 COMPRE OPH EXAM NEW PT 1/>: CPT | Mod: S$GLB,,, | Performed by: OPTOMETRIST

## 2022-03-30 PROCEDURE — 1159F MED LIST DOCD IN RCRD: CPT | Mod: CPTII,S$GLB,, | Performed by: OPTOMETRIST

## 2022-03-30 PROCEDURE — 1100F PTFALLS ASSESS-DOCD GE2>/YR: CPT | Mod: CPTII,S$GLB,, | Performed by: OPTOMETRIST

## 2022-03-30 PROCEDURE — 4010F PR ACE/ARB THEARPY RXD/TAKEN: ICD-10-PCS | Mod: CPTII,S$GLB,, | Performed by: OPTOMETRIST

## 2022-03-30 PROCEDURE — 3044F PR MOST RECENT HEMOGLOBIN A1C LEVEL <7.0%: ICD-10-PCS | Mod: CPTII,S$GLB,, | Performed by: OPTOMETRIST

## 2022-03-30 PROCEDURE — 3288F FALL RISK ASSESSMENT DOCD: CPT | Mod: CPTII,S$GLB,, | Performed by: OPTOMETRIST

## 2022-03-30 PROCEDURE — 1160F RVW MEDS BY RX/DR IN RCRD: CPT | Mod: CPTII,S$GLB,, | Performed by: OPTOMETRIST

## 2022-03-30 PROCEDURE — 1126F AMNT PAIN NOTED NONE PRSNT: CPT | Mod: CPTII,S$GLB,, | Performed by: OPTOMETRIST

## 2022-03-30 PROCEDURE — 3044F HG A1C LEVEL LT 7.0%: CPT | Mod: CPTII,S$GLB,, | Performed by: OPTOMETRIST

## 2022-03-30 PROCEDURE — 1160F PR REVIEW ALL MEDS BY PRESCRIBER/CLIN PHARMACIST DOCUMENTED: ICD-10-PCS | Mod: CPTII,S$GLB,, | Performed by: OPTOMETRIST

## 2022-03-30 PROCEDURE — 3288F PR FALLS RISK ASSESSMENT DOCUMENTED: ICD-10-PCS | Mod: CPTII,S$GLB,, | Performed by: OPTOMETRIST
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
## 2022-04-06 ENCOUNTER — CLINICAL SUPPORT (OUTPATIENT)
Dept: CARDIOLOGY | Facility: HOSPITAL | Age: 67
End: 2022-04-06
Attending: PHYSICIAN ASSISTANT
Payer: MEDICARE

## 2022-04-06 VITALS — WEIGHT: 278 LBS | BODY MASS INDEX: 39.8 KG/M2 | HEIGHT: 70 IN

## 2022-04-06 DIAGNOSIS — I48.91 ATRIAL FIBRILLATION, UNSPECIFIED TYPE: ICD-10-CM

## 2022-04-06 LAB
ASCENDING AORTA: 3.37 CM
BSA FOR ECHO PROCEDURE: 2.5 M2
CV ECHO LV RWT: 0.54 CM
DOP CALC LVOT AREA: 4 CM2
DOP CALC LVOT DIAMETER: 2.25 CM
E WAVE DECELERATION TIME: 238.63 MSEC
E/A RATIO: 0.9
E/E' RATIO: 7 M/S
ECHO LV POSTERIOR WALL: 1.1 CM (ref 0.6–1.1)
EJECTION FRACTION: 55 %
FRACTIONAL SHORTENING: 32 % (ref 28–44)
INTERVENTRICULAR SEPTUM: 1.25 CM (ref 0.6–1.1)
IVRT: 111.32 MSEC
LA MAJOR: 5.19 CM
LA WIDTH: 4.06 CM
LEFT ATRIUM SIZE: 3.74 CM
LEFT INTERNAL DIMENSION IN SYSTOLE: 2.76 CM (ref 2.1–4)
LEFT VENTRICLE DIASTOLIC VOLUME INDEX: 29.79 ML/M2
LEFT VENTRICLE DIASTOLIC VOLUME: 71.49 ML
LEFT VENTRICLE MASS INDEX: 68 G/M2
LEFT VENTRICLE SYSTOLIC VOLUME INDEX: 11.9 ML/M2
LEFT VENTRICLE SYSTOLIC VOLUME: 28.57 ML
LEFT VENTRICULAR INTERNAL DIMENSION IN DIASTOLE: 4.04 CM (ref 3.5–6)
LEFT VENTRICULAR MASS: 162.82 G
LV LATERAL E/E' RATIO: 6.3 M/S
LV SEPTAL E/E' RATIO: 7.88 M/S
MV A" WAVE DURATION": 13.99 MSEC
MV PEAK A VEL: 0.7 M/S
MV PEAK E VEL: 0.63 M/S
MV STENOSIS PRESSURE HALF TIME: 69.2 MS
MV VALVE AREA P 1/2 METHOD: 3.18 CM2
PULM VEIN S/D RATIO: 1.35
PV PEAK D VEL: 0.4 M/S
PV PEAK S VEL: 0.54 M/S
RA MAJOR: 4.28 CM
RA PRESSURE: 3 MMHG
RA WIDTH: 3.8 CM
RIGHT VENTRICULAR END-DIASTOLIC DIMENSION: 4.67 CM
RV TISSUE DOPPLER FREE WALL SYSTOLIC VELOCITY 1 (APICAL 4 CHAMBER VIEW): 15.82 CM/S
SINUS: 3.89 CM
STJ: 3.28 CM
TDI LATERAL: 0.1 M/S
TDI SEPTAL: 0.08 M/S
TDI: 0.09 M/S
TRICUSPID ANNULAR PLANE SYSTOLIC EXCURSION: 2.38 CM

## 2022-04-06 PROCEDURE — 93306 TTE W/DOPPLER COMPLETE: CPT | Mod: PO

## 2022-04-06 PROCEDURE — 93306 ECHO (CUPID ONLY): ICD-10-PCS | Mod: 26,,, | Performed by: INTERNAL MEDICINE

## 2022-04-06 PROCEDURE — 93306 TTE W/DOPPLER COMPLETE: CPT | Mod: 26,,, | Performed by: INTERNAL MEDICINE

## 2022-04-07 ENCOUNTER — PATIENT MESSAGE (OUTPATIENT)
Dept: ADMINISTRATIVE | Facility: HOSPITAL | Age: 67
End: 2022-04-07
Payer: MEDICARE

## 2022-04-11 LAB — NONINV COLON CA DNA+OCC BLD SCRN STL QL: NEGATIVE

## 2022-04-22 ENCOUNTER — TELEPHONE (OUTPATIENT)
Dept: FAMILY MEDICINE | Facility: CLINIC | Age: 67
End: 2022-04-22
Payer: MEDICARE

## 2022-04-22 NOTE — TELEPHONE ENCOUNTER
----- Message from Keisha Gibson sent at 4/22/2022  3:32 PM CDT -----  Contact: pt  Type: Needs Medical Advice    Who Called: pt  Best Call Back Number: [phone]  Inquiry/Question: pt calling to see if cologaurd results have come in, please advise pt  Thank you~

## 2022-05-03 ENCOUNTER — OFFICE VISIT (OUTPATIENT)
Dept: FAMILY MEDICINE | Facility: CLINIC | Age: 67
End: 2022-05-03
Payer: MEDICARE

## 2022-05-03 VITALS
BODY MASS INDEX: 40.43 KG/M2 | SYSTOLIC BLOOD PRESSURE: 128 MMHG | DIASTOLIC BLOOD PRESSURE: 72 MMHG | WEIGHT: 281.75 LBS | HEART RATE: 77 BPM | OXYGEN SATURATION: 95 %

## 2022-05-03 DIAGNOSIS — I73.9 PVD (PERIPHERAL VASCULAR DISEASE): Primary | ICD-10-CM

## 2022-05-03 DIAGNOSIS — M54.42 LEFT-SIDED LOW BACK PAIN WITH LEFT-SIDED SCIATICA, UNSPECIFIED CHRONICITY: ICD-10-CM

## 2022-05-03 DIAGNOSIS — R60.0 LEG EDEMA: ICD-10-CM

## 2022-05-03 PROCEDURE — 1160F RVW MEDS BY RX/DR IN RCRD: CPT | Mod: CPTII,S$GLB,, | Performed by: PHYSICIAN ASSISTANT

## 2022-05-03 PROCEDURE — 1125F AMNT PAIN NOTED PAIN PRSNT: CPT | Mod: CPTII,S$GLB,, | Performed by: PHYSICIAN ASSISTANT

## 2022-05-03 PROCEDURE — 1125F PR PAIN SEVERITY QUANTIFIED, PAIN PRESENT: ICD-10-PCS | Mod: CPTII,S$GLB,, | Performed by: PHYSICIAN ASSISTANT

## 2022-05-03 PROCEDURE — 99214 PR OFFICE/OUTPT VISIT, EST, LEVL IV, 30-39 MIN: ICD-10-PCS | Mod: S$GLB,,, | Performed by: PHYSICIAN ASSISTANT

## 2022-05-03 PROCEDURE — 3078F DIAST BP <80 MM HG: CPT | Mod: CPTII,S$GLB,, | Performed by: PHYSICIAN ASSISTANT

## 2022-05-03 PROCEDURE — 1159F PR MEDICATION LIST DOCUMENTED IN MEDICAL RECORD: ICD-10-PCS | Mod: CPTII,S$GLB,, | Performed by: PHYSICIAN ASSISTANT

## 2022-05-03 PROCEDURE — 3288F PR FALLS RISK ASSESSMENT DOCUMENTED: ICD-10-PCS | Mod: CPTII,S$GLB,, | Performed by: PHYSICIAN ASSISTANT

## 2022-05-03 PROCEDURE — 1159F MED LIST DOCD IN RCRD: CPT | Mod: CPTII,S$GLB,, | Performed by: PHYSICIAN ASSISTANT

## 2022-05-03 PROCEDURE — 99214 OFFICE O/P EST MOD 30 MIN: CPT | Mod: S$GLB,,, | Performed by: PHYSICIAN ASSISTANT

## 2022-05-03 PROCEDURE — 3288F FALL RISK ASSESSMENT DOCD: CPT | Mod: CPTII,S$GLB,, | Performed by: PHYSICIAN ASSISTANT

## 2022-05-03 PROCEDURE — 4010F ACE/ARB THERAPY RXD/TAKEN: CPT | Mod: CPTII,S$GLB,, | Performed by: PHYSICIAN ASSISTANT

## 2022-05-03 PROCEDURE — 1160F PR REVIEW ALL MEDS BY PRESCRIBER/CLIN PHARMACIST DOCUMENTED: ICD-10-PCS | Mod: CPTII,S$GLB,, | Performed by: PHYSICIAN ASSISTANT

## 2022-05-03 PROCEDURE — 3078F PR MOST RECENT DIASTOLIC BLOOD PRESSURE < 80 MM HG: ICD-10-PCS | Mod: CPTII,S$GLB,, | Performed by: PHYSICIAN ASSISTANT

## 2022-05-03 PROCEDURE — 3074F SYST BP LT 130 MM HG: CPT | Mod: CPTII,S$GLB,, | Performed by: PHYSICIAN ASSISTANT

## 2022-05-03 PROCEDURE — 3008F BODY MASS INDEX DOCD: CPT | Mod: CPTII,S$GLB,, | Performed by: PHYSICIAN ASSISTANT

## 2022-05-03 PROCEDURE — 3008F PR BODY MASS INDEX (BMI) DOCUMENTED: ICD-10-PCS | Mod: CPTII,S$GLB,, | Performed by: PHYSICIAN ASSISTANT

## 2022-05-03 PROCEDURE — 3044F PR MOST RECENT HEMOGLOBIN A1C LEVEL <7.0%: ICD-10-PCS | Mod: CPTII,S$GLB,, | Performed by: PHYSICIAN ASSISTANT

## 2022-05-03 PROCEDURE — 4010F PR ACE/ARB THEARPY RXD/TAKEN: ICD-10-PCS | Mod: CPTII,S$GLB,, | Performed by: PHYSICIAN ASSISTANT

## 2022-05-03 PROCEDURE — 3044F HG A1C LEVEL LT 7.0%: CPT | Mod: CPTII,S$GLB,, | Performed by: PHYSICIAN ASSISTANT

## 2022-05-03 PROCEDURE — 1101F PR PT FALLS ASSESS DOC 0-1 FALLS W/OUT INJ PAST YR: ICD-10-PCS | Mod: CPTII,S$GLB,, | Performed by: PHYSICIAN ASSISTANT

## 2022-05-03 PROCEDURE — 1101F PT FALLS ASSESS-DOCD LE1/YR: CPT | Mod: CPTII,S$GLB,, | Performed by: PHYSICIAN ASSISTANT

## 2022-05-03 PROCEDURE — 3074F PR MOST RECENT SYSTOLIC BLOOD PRESSURE < 130 MM HG: ICD-10-PCS | Mod: CPTII,S$GLB,, | Performed by: PHYSICIAN ASSISTANT

## 2022-05-03 RX ORDER — FUROSEMIDE 40 MG/1
40 TABLET ORAL DAILY PRN
COMMUNITY
End: 2022-05-03 | Stop reason: SDUPTHER

## 2022-05-03 RX ORDER — FUROSEMIDE 40 MG/1
40 TABLET ORAL DAILY PRN
Qty: 90 TABLET | Refills: 3 | Status: SHIPPED | OUTPATIENT
Start: 2022-05-03 | End: 2023-03-15

## 2022-05-03 RX ORDER — HYDROCODONE BITARTRATE AND ACETAMINOPHEN 7.5; 325 MG/1; MG/1
1 TABLET ORAL EVERY 6 HOURS PRN
Qty: 25 TABLET | Refills: 0 | Status: SHIPPED | OUTPATIENT
Start: 2022-05-03 | End: 2022-06-28

## 2022-05-03 NOTE — PROGRESS NOTES
Subjective:       Patient ID: Bossman Duke is a 66 y.o. male.    Chief Complaint: Back Spasms    Low-back Pain  This is a recurrent problem. The current episode started 1 to 4 weeks ago. The problem occurs constantly. The problem has been waxing and waning. Associated symptoms include numbness. Pertinent negatives include no abdominal pain, chest pain, chills, congestion, diaphoresis, fatigue, fever, headaches, nausea, vomiting or weakness. The symptoms are aggravated by bending, stress and twisting. Treatments tried: has been to the back and spine clinic.  The treatment provided mild relief.   Edema  This is a recurrent problem. The current episode started 1 to 4 weeks ago. The problem occurs constantly. The problem has been unchanged. Associated symptoms include numbness. Pertinent negatives include no abdominal pain, chest pain, chills, congestion, diaphoresis, fatigue, fever, headaches, nausea, vomiting or weakness. Exacerbated by: sitting. Treatments tried: has started lasix. The treatment provided mild relief.     Past Medical History:   Diagnosis Date    Anticoagulant long-term use     Arthritis     COPD (chronic obstructive pulmonary disease)     Hypertension     PAD (peripheral artery disease)     PUD (peptic ulcer disease)        Review of Systems   Constitutional: Positive for activity change. Negative for chills, diaphoresis, fatigue and fever.   HENT: Negative for nasal congestion.    Respiratory: Negative for chest tightness and shortness of breath.    Cardiovascular: Positive for leg swelling. Negative for chest pain.   Gastrointestinal: Negative for abdominal pain, nausea and vomiting.   Musculoskeletal: Positive for back pain.   Neurological: Positive for numbness. Negative for weakness and headaches.         Objective:      Physical Exam  Constitutional:       General: He is not in acute distress.     Appearance: Normal appearance. He is not ill-appearing, toxic-appearing or diaphoretic.    HENT:      Head: Normocephalic and atraumatic.   Cardiovascular:      Rate and Rhythm: Normal rate and regular rhythm.      Pulses: Normal pulses.      Heart sounds: Normal heart sounds. No murmur heard.    No friction rub. No gallop.   Pulmonary:      Effort: Pulmonary effort is normal. No respiratory distress.      Breath sounds: Normal breath sounds. No stridor. No wheezing, rhonchi or rales.   Chest:      Chest wall: No tenderness.   Abdominal:      General: Bowel sounds are normal.      Palpations: Abdomen is soft.      Tenderness: There is no abdominal tenderness.   Musculoskeletal:         General: Swelling present.      Right lower leg: Edema present.      Left lower leg: Edema present.   Neurological:      Mental Status: He is alert.         Assessment:       Problem List Items Addressed This Visit     PVD (peripheral vascular disease) - Primary    Left-sided low back pain with left-sided sciatica      Other Visit Diagnoses     Leg edema              Plan:       PVD (peripheral vascular disease)    Leg edema    Left-sided low back pain with left-sided sciatica, unspecified chronicity    Other orders  -     HYDROcodone-acetaminophen (NORCO) 7.5-325 mg per tablet; Take 1 tablet by mouth every 6 (six) hours as needed for Pain.  Dispense: 25 tablet; Refill: 0  -     furosemide (LASIX) 40 MG tablet; Take 1 tablet (40 mg total) by mouth daily as needed (as needed for leg swelling).  Dispense: 90 tablet; Refill: 3       I spent 30 minutes on this encounter, time includes face-to-face, chart review, documentation, test review and orders.

## 2022-05-04 ENCOUNTER — CLINICAL SUPPORT (OUTPATIENT)
Dept: CARDIOLOGY | Facility: HOSPITAL | Age: 67
End: 2022-05-04
Attending: PHYSICIAN ASSISTANT
Payer: MEDICARE

## 2022-05-04 DIAGNOSIS — I48.91 ATRIAL FIBRILLATION, UNSPECIFIED TYPE: ICD-10-CM

## 2022-05-31 ENCOUNTER — TELEPHONE (OUTPATIENT)
Dept: FAMILY MEDICINE | Facility: CLINIC | Age: 67
End: 2022-05-31
Payer: MEDICARE

## 2022-05-31 NOTE — TELEPHONE ENCOUNTER
----- Message from Arlene Montano sent at 5/31/2022  8:49 AM CDT -----  Patient Call Back    Who Called: PT     What is the request in detail: pt calling to speak with someone regarding him having lab work done. The pt stated that his cardiologist wants him to have blood work done. Pls advise.    Can the clinic reply by [facility]?    Best Call Back Number: [phone]

## 2022-05-31 NOTE — TELEPHONE ENCOUNTER
Patient called. He states he is having terrible joint pain. He discussed with his cardiologist and they suggest he have some labs done to check for RA. He states he has an appt with Bossman next week and will discuss with him them. Advised we can do labs at his visit if needed.

## 2022-06-24 ENCOUNTER — TELEPHONE (OUTPATIENT)
Dept: FAMILY MEDICINE | Facility: CLINIC | Age: 67
End: 2022-06-24
Payer: MEDICARE

## 2022-06-24 NOTE — TELEPHONE ENCOUNTER
----- Message from Anastasia Paiz sent at 6/24/2022  1:20 PM CDT -----  Contact: MESFIN FRANKEL [[id number]]  Type: Patient Call Back    Who called:MESFIN FRANKEL [[id number]]    What is the request in detail: The patient would like to schedule a appointment, he wanted to speak with the staff before the appointment was scheduled.     Can the clinic reply by [facility]?    Would the patient rather a call back or a response via [facility]?     Best call back number: [phone] (mobile)    Additional Information:

## 2022-06-28 ENCOUNTER — OFFICE VISIT (OUTPATIENT)
Dept: FAMILY MEDICINE | Facility: CLINIC | Age: 67
End: 2022-06-28
Payer: MEDICARE

## 2022-06-28 VITALS
BODY MASS INDEX: 41.99 KG/M2 | DIASTOLIC BLOOD PRESSURE: 88 MMHG | HEART RATE: 68 BPM | WEIGHT: 293.31 LBS | HEIGHT: 70 IN | SYSTOLIC BLOOD PRESSURE: 139 MMHG

## 2022-06-28 DIAGNOSIS — M54.30 SCIATICA, UNSPECIFIED LATERALITY: Primary | ICD-10-CM

## 2022-06-28 PROCEDURE — 1159F PR MEDICATION LIST DOCUMENTED IN MEDICAL RECORD: ICD-10-PCS | Mod: CPTII,S$GLB,, | Performed by: PHYSICIAN ASSISTANT

## 2022-06-28 PROCEDURE — 1101F PT FALLS ASSESS-DOCD LE1/YR: CPT | Mod: CPTII,S$GLB,, | Performed by: PHYSICIAN ASSISTANT

## 2022-06-28 PROCEDURE — 1160F PR REVIEW ALL MEDS BY PRESCRIBER/CLIN PHARMACIST DOCUMENTED: ICD-10-PCS | Mod: CPTII,S$GLB,, | Performed by: PHYSICIAN ASSISTANT

## 2022-06-28 PROCEDURE — 4010F PR ACE/ARB THEARPY RXD/TAKEN: ICD-10-PCS | Mod: CPTII,S$GLB,, | Performed by: PHYSICIAN ASSISTANT

## 2022-06-28 PROCEDURE — 3079F PR MOST RECENT DIASTOLIC BLOOD PRESSURE 80-89 MM HG: ICD-10-PCS | Mod: CPTII,S$GLB,, | Performed by: PHYSICIAN ASSISTANT

## 2022-06-28 PROCEDURE — 36415 COLL VENOUS BLD VENIPUNCTURE: CPT | Mod: S$GLB,,, | Performed by: PHYSICIAN ASSISTANT

## 2022-06-28 PROCEDURE — 3079F DIAST BP 80-89 MM HG: CPT | Mod: CPTII,S$GLB,, | Performed by: PHYSICIAN ASSISTANT

## 2022-06-28 PROCEDURE — 4010F ACE/ARB THERAPY RXD/TAKEN: CPT | Mod: CPTII,S$GLB,, | Performed by: PHYSICIAN ASSISTANT

## 2022-06-28 PROCEDURE — 99214 OFFICE O/P EST MOD 30 MIN: CPT | Mod: S$GLB,,, | Performed by: PHYSICIAN ASSISTANT

## 2022-06-28 PROCEDURE — 1101F PR PT FALLS ASSESS DOC 0-1 FALLS W/OUT INJ PAST YR: ICD-10-PCS | Mod: CPTII,S$GLB,, | Performed by: PHYSICIAN ASSISTANT

## 2022-06-28 PROCEDURE — 3044F PR MOST RECENT HEMOGLOBIN A1C LEVEL <7.0%: ICD-10-PCS | Mod: CPTII,S$GLB,, | Performed by: PHYSICIAN ASSISTANT

## 2022-06-28 PROCEDURE — 1126F AMNT PAIN NOTED NONE PRSNT: CPT | Mod: CPTII,S$GLB,, | Performed by: PHYSICIAN ASSISTANT

## 2022-06-28 PROCEDURE — 3008F BODY MASS INDEX DOCD: CPT | Mod: CPTII,S$GLB,, | Performed by: PHYSICIAN ASSISTANT

## 2022-06-28 PROCEDURE — 1159F MED LIST DOCD IN RCRD: CPT | Mod: CPTII,S$GLB,, | Performed by: PHYSICIAN ASSISTANT

## 2022-06-28 PROCEDURE — 3075F SYST BP GE 130 - 139MM HG: CPT | Mod: CPTII,S$GLB,, | Performed by: PHYSICIAN ASSISTANT

## 2022-06-28 PROCEDURE — 86038 ANTINUCLEAR ANTIBODIES: CPT | Performed by: PHYSICIAN ASSISTANT

## 2022-06-28 PROCEDURE — 3288F PR FALLS RISK ASSESSMENT DOCUMENTED: ICD-10-PCS | Mod: CPTII,S$GLB,, | Performed by: PHYSICIAN ASSISTANT

## 2022-06-28 PROCEDURE — 3075F PR MOST RECENT SYSTOLIC BLOOD PRESS GE 130-139MM HG: ICD-10-PCS | Mod: CPTII,S$GLB,, | Performed by: PHYSICIAN ASSISTANT

## 2022-06-28 PROCEDURE — 3288F FALL RISK ASSESSMENT DOCD: CPT | Mod: CPTII,S$GLB,, | Performed by: PHYSICIAN ASSISTANT

## 2022-06-28 PROCEDURE — 86431 RHEUMATOID FACTOR QUANT: CPT | Performed by: PHYSICIAN ASSISTANT

## 2022-06-28 PROCEDURE — 3008F PR BODY MASS INDEX (BMI) DOCUMENTED: ICD-10-PCS | Mod: CPTII,S$GLB,, | Performed by: PHYSICIAN ASSISTANT

## 2022-06-28 PROCEDURE — 3044F HG A1C LEVEL LT 7.0%: CPT | Mod: CPTII,S$GLB,, | Performed by: PHYSICIAN ASSISTANT

## 2022-06-28 PROCEDURE — 99214 PR OFFICE/OUTPT VISIT, EST, LEVL IV, 30-39 MIN: ICD-10-PCS | Mod: S$GLB,,, | Performed by: PHYSICIAN ASSISTANT

## 2022-06-28 PROCEDURE — 1126F PR PAIN SEVERITY QUANTIFIED, NO PAIN PRESENT: ICD-10-PCS | Mod: CPTII,S$GLB,, | Performed by: PHYSICIAN ASSISTANT

## 2022-06-28 PROCEDURE — 1160F RVW MEDS BY RX/DR IN RCRD: CPT | Mod: CPTII,S$GLB,, | Performed by: PHYSICIAN ASSISTANT

## 2022-06-28 PROCEDURE — 36415 PR COLLECTION VENOUS BLOOD,VENIPUNCTURE: ICD-10-PCS | Mod: S$GLB,,, | Performed by: PHYSICIAN ASSISTANT

## 2022-06-28 RX ORDER — TIZANIDINE 4 MG/1
4 TABLET ORAL EVERY 6 HOURS PRN
Qty: 60 TABLET | Refills: 3 | Status: SHIPPED | OUTPATIENT
Start: 2022-06-28 | End: 2022-07-08

## 2022-06-28 NOTE — PROGRESS NOTES
Subjective:       Patient ID: Bossman Duke is a 66 y.o. male.    Chief Complaint: Follow-up and Fall    Low-back Pain  This is a recurrent problem. The current episode started more than 1 month ago. The problem occurs daily. The problem has been waxing and waning. Associated symptoms include numbness and weakness. Pertinent negatives include no abdominal pain or chest pain. Associated symptoms comments: Radicular pain down the leg. He has tried rest, heat, ice, acetaminophen, lying down, immobilization, relaxation and position changes for the symptoms. The treatment provided no relief.     Past Medical History:   Diagnosis Date    Anticoagulant long-term use     Arthritis     COPD (chronic obstructive pulmonary disease)     Hypertension     PAD (peripheral artery disease)     PUD (peptic ulcer disease)        Review of Systems   Constitutional: Positive for activity change. Negative for appetite change.   Respiratory: Negative for chest tightness and shortness of breath.    Cardiovascular: Negative for chest pain.   Gastrointestinal: Negative for abdominal pain.   Neurological: Positive for weakness and numbness.         Objective:      Physical Exam  Constitutional:       General: He is not in acute distress.     Appearance: Normal appearance. He is not ill-appearing, toxic-appearing or diaphoretic.   Cardiovascular:      Rate and Rhythm: Normal rate and regular rhythm.      Pulses: Normal pulses.      Heart sounds: Normal heart sounds. No murmur heard.    No friction rub. No gallop.   Pulmonary:      Effort: Pulmonary effort is normal. No respiratory distress.      Breath sounds: Normal breath sounds. No stridor. No wheezing, rhonchi or rales.   Chest:      Chest wall: No tenderness.   Abdominal:      Tenderness: There is no abdominal tenderness.   Musculoskeletal:        Legs:    Neurological:      Mental Status: He is alert.         Assessment:       Problem List Items Addressed This Visit    None      Visit Diagnoses     Sciatica, unspecified laterality    -  Primary    Relevant Orders    Rheumatoid Factor    SOSA Screen w/Reflex          Plan:       Sciatica, unspecified laterality  -     Rheumatoid Factor; Future; Expected date: 06/28/2022  -     SOSA Screen w/Reflex; Future; Expected date: 06/28/2022    Other orders  -     tiZANidine (ZANAFLEX) 4 MG tablet; Take 1 tablet (4 mg total) by mouth every 6 (six) hours as needed (back spams).  Dispense: 60 tablet; Refill: 3         I spent 30 minutes on this encounter, time includes face-to-face, chart review, documentation, test review and orders.

## 2022-06-29 LAB
ANA SER QL IF: NORMAL
RHEUMATOID FACT SERPL-ACNC: <13 IU/ML (ref 0–15)

## 2022-07-12 ENCOUNTER — OFFICE VISIT (OUTPATIENT)
Dept: CARDIOLOGY | Facility: CLINIC | Age: 67
End: 2022-07-12
Payer: MEDICARE

## 2022-07-12 VITALS
HEIGHT: 70 IN | WEIGHT: 296.75 LBS | BODY MASS INDEX: 42.48 KG/M2 | SYSTOLIC BLOOD PRESSURE: 140 MMHG | HEART RATE: 81 BPM | DIASTOLIC BLOOD PRESSURE: 79 MMHG

## 2022-07-12 DIAGNOSIS — I73.9 PVD (PERIPHERAL VASCULAR DISEASE): Primary | ICD-10-CM

## 2022-07-12 DIAGNOSIS — I48.0 PAROXYSMAL ATRIAL FIBRILLATION: ICD-10-CM

## 2022-07-12 DIAGNOSIS — I10 ESSENTIAL HYPERTENSION: ICD-10-CM

## 2022-07-12 PROCEDURE — 99999 PR PBB SHADOW E&M-EST. PATIENT-LVL III: ICD-10-PCS | Mod: PBBFAC,,, | Performed by: INTERNAL MEDICINE

## 2022-07-12 PROCEDURE — 99214 PR OFFICE/OUTPT VISIT, EST, LEVL IV, 30-39 MIN: ICD-10-PCS | Mod: S$GLB,,, | Performed by: INTERNAL MEDICINE

## 2022-07-12 PROCEDURE — 3077F SYST BP >= 140 MM HG: CPT | Mod: CPTII,S$GLB,, | Performed by: INTERNAL MEDICINE

## 2022-07-12 PROCEDURE — 4010F PR ACE/ARB THEARPY RXD/TAKEN: ICD-10-PCS | Mod: CPTII,S$GLB,, | Performed by: INTERNAL MEDICINE

## 2022-07-12 PROCEDURE — 4010F ACE/ARB THERAPY RXD/TAKEN: CPT | Mod: CPTII,S$GLB,, | Performed by: INTERNAL MEDICINE

## 2022-07-12 PROCEDURE — 1159F MED LIST DOCD IN RCRD: CPT | Mod: CPTII,S$GLB,, | Performed by: INTERNAL MEDICINE

## 2022-07-12 PROCEDURE — 1160F PR REVIEW ALL MEDS BY PRESCRIBER/CLIN PHARMACIST DOCUMENTED: ICD-10-PCS | Mod: CPTII,S$GLB,, | Performed by: INTERNAL MEDICINE

## 2022-07-12 PROCEDURE — 3078F PR MOST RECENT DIASTOLIC BLOOD PRESSURE < 80 MM HG: ICD-10-PCS | Mod: CPTII,S$GLB,, | Performed by: INTERNAL MEDICINE

## 2022-07-12 PROCEDURE — 99999 PR PBB SHADOW E&M-EST. PATIENT-LVL III: CPT | Mod: PBBFAC,,, | Performed by: INTERNAL MEDICINE

## 2022-07-12 PROCEDURE — 1160F RVW MEDS BY RX/DR IN RCRD: CPT | Mod: CPTII,S$GLB,, | Performed by: INTERNAL MEDICINE

## 2022-07-12 PROCEDURE — 3008F PR BODY MASS INDEX (BMI) DOCUMENTED: ICD-10-PCS | Mod: CPTII,S$GLB,, | Performed by: INTERNAL MEDICINE

## 2022-07-12 PROCEDURE — 99214 OFFICE O/P EST MOD 30 MIN: CPT | Mod: S$GLB,,, | Performed by: INTERNAL MEDICINE

## 2022-07-12 PROCEDURE — 3078F DIAST BP <80 MM HG: CPT | Mod: CPTII,S$GLB,, | Performed by: INTERNAL MEDICINE

## 2022-07-12 PROCEDURE — 1126F AMNT PAIN NOTED NONE PRSNT: CPT | Mod: CPTII,S$GLB,, | Performed by: INTERNAL MEDICINE

## 2022-07-12 PROCEDURE — 3077F PR MOST RECENT SYSTOLIC BLOOD PRESSURE >= 140 MM HG: ICD-10-PCS | Mod: CPTII,S$GLB,, | Performed by: INTERNAL MEDICINE

## 2022-07-12 PROCEDURE — 1159F PR MEDICATION LIST DOCUMENTED IN MEDICAL RECORD: ICD-10-PCS | Mod: CPTII,S$GLB,, | Performed by: INTERNAL MEDICINE

## 2022-07-12 PROCEDURE — 1126F PR PAIN SEVERITY QUANTIFIED, NO PAIN PRESENT: ICD-10-PCS | Mod: CPTII,S$GLB,, | Performed by: INTERNAL MEDICINE

## 2022-07-12 PROCEDURE — 3008F BODY MASS INDEX DOCD: CPT | Mod: CPTII,S$GLB,, | Performed by: INTERNAL MEDICINE

## 2022-07-12 PROCEDURE — 3044F HG A1C LEVEL LT 7.0%: CPT | Mod: CPTII,S$GLB,, | Performed by: INTERNAL MEDICINE

## 2022-07-12 PROCEDURE — 3044F PR MOST RECENT HEMOGLOBIN A1C LEVEL <7.0%: ICD-10-PCS | Mod: CPTII,S$GLB,, | Performed by: INTERNAL MEDICINE

## 2022-07-12 RX ORDER — METOLAZONE 2.5 MG/1
2.5 TABLET ORAL DAILY
Qty: 30 TABLET | Refills: 11 | Status: SHIPPED | OUTPATIENT
Start: 2022-07-12 | End: 2022-07-12

## 2022-07-12 RX ORDER — METOLAZONE 2.5 MG/1
2.5 TABLET ORAL WEEKLY
Qty: 4 TABLET | Refills: 6 | Status: SHIPPED | OUTPATIENT
Start: 2022-07-12 | End: 2023-03-15

## 2022-07-12 NOTE — PROGRESS NOTES
Subjective:    Patient ID:  Bossman Duke is a 66 y.o. male who presents for follow-up of hypertension, paf    HPI  He comes for follow up with worsening swelling of lower extremities  Blood pressure has been relatively stable at home  Not really watching salt intake    Review of Systems   Constitutional: Negative for decreased appetite, malaise/fatigue, weight gain and weight loss.   Cardiovascular: Negative for chest pain, dyspnea on exertion, leg swelling, palpitations and syncope.   Respiratory: Negative for cough and shortness of breath.    Gastrointestinal: Negative.    Neurological: Negative for weakness.   All other systems reviewed and are negative.       Objective:      Physical Exam  Vitals and nursing note reviewed.   Constitutional:       Appearance: Normal appearance. He is well-developed.   HENT:      Head: Normocephalic.   Eyes:      Pupils: Pupils are equal, round, and reactive to light.   Neck:      Thyroid: No thyromegaly.      Vascular: No carotid bruit or JVD.   Cardiovascular:      Rate and Rhythm: Normal rate and regular rhythm.      Chest Wall: PMI is not displaced.      Pulses: Normal pulses and intact distal pulses.      Heart sounds: Normal heart sounds. No murmur heard.    No gallop.   Pulmonary:      Effort: Pulmonary effort is normal.      Breath sounds: Normal breath sounds.   Abdominal:      Palpations: Abdomen is soft. There is no mass.      Tenderness: There is no abdominal tenderness.   Musculoskeletal:         General: Normal range of motion.      Cervical back: Normal range of motion and neck supple.   Skin:     General: Skin is warm.   Neurological:      Mental Status: He is alert and oriented to person, place, and time.      Sensory: No sensory deficit.      Deep Tendon Reflexes: Reflexes are normal and symmetric.           Assessment:       1. PVD (peripheral vascular disease)    2. Essential hypertension    3. Paroxysmal atrial fibrillation         Plan:   Add metolazone 2.5  mg p.o. weekly  Continue all cardiac medications  Regular exercise program  Weight loss  Low-salt diet  Three-month follow-up with Lindsey Fontanez

## 2022-07-14 ENCOUNTER — TELEPHONE (OUTPATIENT)
Dept: PAIN MEDICINE | Facility: CLINIC | Age: 67
End: 2022-07-14
Payer: MEDICARE

## 2022-07-14 NOTE — TELEPHONE ENCOUNTER
----- Message from Malena Alarcon sent at 7/14/2022  9:02 AM CDT -----  Contact: Patient  Type:  Sooner Apoointment Request    Caller is requesting a sooner appointment.  Caller declined first available appointment listed below.  Caller will not accept being placed on the waitlist and is requesting a message be sent to doctor.    Name of Caller: Patient    When is the first available appointment?     Symptoms: Lower back pain     Would the patient rather a call back or a response via [facility]?  Call    Best Call Back Number: [phone] (home)     Additional Information:  Patient needs to be seen next week for back pain

## 2022-07-15 ENCOUNTER — OFFICE VISIT (OUTPATIENT)
Dept: PAIN MEDICINE | Facility: CLINIC | Age: 67
End: 2022-07-15
Payer: MEDICARE

## 2022-07-15 VITALS
HEART RATE: 48 BPM | BODY MASS INDEX: 41.47 KG/M2 | DIASTOLIC BLOOD PRESSURE: 82 MMHG | WEIGHT: 289.69 LBS | SYSTOLIC BLOOD PRESSURE: 143 MMHG | HEIGHT: 70 IN

## 2022-07-15 DIAGNOSIS — M47.816 LUMBAR SPONDYLOSIS: Primary | ICD-10-CM

## 2022-07-15 DIAGNOSIS — M54.9 DORSALGIA, UNSPECIFIED: ICD-10-CM

## 2022-07-15 PROCEDURE — 1159F PR MEDICATION LIST DOCUMENTED IN MEDICAL RECORD: ICD-10-PCS | Mod: CPTII,S$GLB,,

## 2022-07-15 PROCEDURE — 4010F ACE/ARB THERAPY RXD/TAKEN: CPT | Mod: CPTII,S$GLB,,

## 2022-07-15 PROCEDURE — 3079F PR MOST RECENT DIASTOLIC BLOOD PRESSURE 80-89 MM HG: ICD-10-PCS | Mod: CPTII,S$GLB,,

## 2022-07-15 PROCEDURE — 3288F PR FALLS RISK ASSESSMENT DOCUMENTED: ICD-10-PCS | Mod: CPTII,S$GLB,,

## 2022-07-15 PROCEDURE — 1126F AMNT PAIN NOTED NONE PRSNT: CPT | Mod: CPTII,S$GLB,,

## 2022-07-15 PROCEDURE — 3044F HG A1C LEVEL LT 7.0%: CPT | Mod: CPTII,S$GLB,,

## 2022-07-15 PROCEDURE — 3008F PR BODY MASS INDEX (BMI) DOCUMENTED: ICD-10-PCS | Mod: CPTII,S$GLB,,

## 2022-07-15 PROCEDURE — 3008F BODY MASS INDEX DOCD: CPT | Mod: CPTII,S$GLB,,

## 2022-07-15 PROCEDURE — 1126F PR PAIN SEVERITY QUANTIFIED, NO PAIN PRESENT: ICD-10-PCS | Mod: CPTII,S$GLB,,

## 2022-07-15 PROCEDURE — 3079F DIAST BP 80-89 MM HG: CPT | Mod: CPTII,S$GLB,,

## 2022-07-15 PROCEDURE — 1100F PTFALLS ASSESS-DOCD GE2>/YR: CPT | Mod: CPTII,S$GLB,,

## 2022-07-15 PROCEDURE — 3044F PR MOST RECENT HEMOGLOBIN A1C LEVEL <7.0%: ICD-10-PCS | Mod: CPTII,S$GLB,,

## 2022-07-15 PROCEDURE — 99214 OFFICE O/P EST MOD 30 MIN: CPT | Mod: S$GLB,,,

## 2022-07-15 PROCEDURE — 99999 PR PBB SHADOW E&M-EST. PATIENT-LVL IV: CPT | Mod: PBBFAC,,,

## 2022-07-15 PROCEDURE — 3288F FALL RISK ASSESSMENT DOCD: CPT | Mod: CPTII,S$GLB,,

## 2022-07-15 PROCEDURE — 1159F MED LIST DOCD IN RCRD: CPT | Mod: CPTII,S$GLB,,

## 2022-07-15 PROCEDURE — 4010F PR ACE/ARB THEARPY RXD/TAKEN: ICD-10-PCS | Mod: CPTII,S$GLB,,

## 2022-07-15 PROCEDURE — 99214 PR OFFICE/OUTPT VISIT, EST, LEVL IV, 30-39 MIN: ICD-10-PCS | Mod: S$GLB,,,

## 2022-07-15 PROCEDURE — 3077F SYST BP >= 140 MM HG: CPT | Mod: CPTII,S$GLB,,

## 2022-07-15 PROCEDURE — 1100F PR PT FALLS ASSESS DOC 2+ FALLS/FALL W/INJURY/YR: ICD-10-PCS | Mod: CPTII,S$GLB,,

## 2022-07-15 PROCEDURE — 99999 PR PBB SHADOW E&M-EST. PATIENT-LVL IV: ICD-10-PCS | Mod: PBBFAC,,,

## 2022-07-15 PROCEDURE — 3077F PR MOST RECENT SYSTOLIC BLOOD PRESSURE >= 140 MM HG: ICD-10-PCS | Mod: CPTII,S$GLB,,

## 2022-07-15 RX ORDER — SODIUM CHLORIDE, SODIUM LACTATE, POTASSIUM CHLORIDE, CALCIUM CHLORIDE 600; 310; 30; 20 MG/100ML; MG/100ML; MG/100ML; MG/100ML
INJECTION, SOLUTION INTRAVENOUS CONTINUOUS
Status: CANCELLED | OUTPATIENT
Start: 2022-07-15

## 2022-07-15 RX ORDER — TIZANIDINE 4 MG/1
4 TABLET ORAL NIGHTLY
COMMUNITY
End: 2023-03-15 | Stop reason: SDUPTHER

## 2022-07-15 NOTE — PROGRESS NOTES
[facility] Follow Up Evaluation    Referred by: Dr. Nelson GAMBLE  Reason for referral: neck pain    CC:   Chief Complaint   Patient presents with    Low-back Pain      Last 3 PDI Scores 3/26/2021 2/3/2021   Pain Disability Index (PDI) 0 33       Interval HPI 7/15/2022: Bossman Duke returns to the clinic for follow up.  He is reporting return of his lower back pain, constant, 5-8/10, worse with activities such as walking and standing extended distances in addition to household chores such as dishes and cooking.  He denies any radiating pain, numbness, tingling or any weakness.  He denies any changes to his bowel or bladder function.  Over the past year he has been working closely with his cardiologist and now has good control of his cardiovascular conditions.      Pain Intervention History:  - s/p b/l L3/4 and L4/5 RFA on 3/2021 with 50% relief of his pain.    HPI:   Bossman Duke is a 66 y.o. male who complains of neck pain    Onset: at least a few years  Progression: since onset, pain is gradually worsening  Current Pain Score: 8/10  Typical Range: 5-9/10  Timing: intermittent  Quality: dull, aching  Radiation: no  Associated numbness or weakness: no numbness, no weakness   Exacerbated by: standing, back extension  Allievated by: rest  Is Pain Level Acceptable?: No    Previous Therapies:  PT/OT:   HEP:   Interventions:   Surgery:  - h/o cervical fusion 7 years ago  - h/o lumbar likely laminectomy 5 ago  Medications:   - NSAIDS:   - MSK Relaxants:   - TCAs:   - SNRIs:   - Topicals:   - Anticonvulsants:  - Opioids:     History:    Current Outpatient Medications:     aspirin (ECOTRIN) 81 MG EC tablet, Take 81 mg by mouth once daily., Disp: , Rfl:     clopidogreL (PLAVIX) 75 mg tablet, TAKE ONE TABLET BY MOUTH DAILY, Disp: 90 tablet, Rfl: 3    cyanocobalamin (VITAMIN B-12) 100 MCG tablet, Take 100 mcg by mouth once daily., Disp: , Rfl:     famotidine (PEPCID) 20 MG tablet, Take 20 mg by mouth 2 (two)  "times daily., Disp: , Rfl:     fluticasone-umeclidin-vilanter (TRELEGY ELLIPTA) 100-62.5-25 mcg DsDv, Inhale 1 puff into the lungs once daily., Disp: 30 each, Rfl: 11    furosemide (LASIX) 40 MG tablet, Take 1 tablet (40 mg total) by mouth daily as needed (as needed for leg swelling)., Disp: 90 tablet, Rfl: 3    metOLazone (ZAROXOLYN) 2.5 MG tablet, Take 1 tablet (2.5 mg total) by mouth once a week., Disp: 4 tablet, Rfl: 6    multivit-mins no.63/iron/folic (M-VIT ORAL), Take 1 tablet by mouth once daily., Disp: , Rfl:     TIADYLT  mg Cs24, 360 mg once daily., Disp: , Rfl:     valsartan (DIOVAN) 320 MG tablet, Take 1 tablet (320 mg total) by mouth once daily., Disp: 90 tablet, Rfl: 1    Past Medical History:   Diagnosis Date    Anticoagulant long-term use     Arthritis     COPD (chronic obstructive pulmonary disease)     Hypertension     PAD (peripheral artery disease)     PUD (peptic ulcer disease)        Past Surgical History:   Procedure Laterality Date    APPENDECTOMY      CERVICAL SPINE SURGERY      EYE SURGERY Left     "as  a child"    INJECTION OF ANESTHETIC AGENT AROUND MEDIAL BRANCH NERVES INNERVATING LUMBAR FACET JOINT Bilateral 2/9/2021    Procedure: Block-nerve-medial branch-lumbar L3/4, L4/5, L5/S1;  Surgeon: Linwood Aponte MD;  Location: [facility] OR;  Service: Pain Management;  Laterality: Bilateral;    LUMBAR DISC SURGERY      PERCUTANEOUS TRANSLUMINAL ANGIOPLASTY N/A 2/15/2021    Procedure: Pta (Angioplasty, Percutaneous, Transluminal);  Surgeon: Jl Neil MD;  Location: [facility] CATH;  Service: Cardiology;  Laterality: N/A;    RADIOFREQUENCY ABLATION Right 3/1/2021    Procedure: Radiofrequency Ablation L3/4, L4/5;  Surgeon: Linwood Aponte MD;  Location: [facility] OR;  Service: Pain Management;  Laterality: Right;    RADIOFREQUENCY ABLATION Left 3/10/2021    Procedure: Radiofrequency Ablation L3/4, L4/5;  Surgeon: Linwood Aponte MD;  Location: [facility] OR;  Service: Pain " "Management;  Laterality: Left;       Family History   Problem Relation Age of Onset    Diabetes Mother     Heart disease Mother     Heart disease Father     Glaucoma Neg Hx     Macular degeneration Neg Hx     Retinal detachment Neg Hx        Social History     Socioeconomic History    Marital status:    Tobacco Use    Smoking status: Former Smoker     Packs/day: 0.25    Smokeless tobacco: Never Used    Tobacco comment: Quit about 2 months ago in Feb 2021   Substance and Sexual Activity    Alcohol use: Yes     Alcohol/week: 12.0 standard drinks     Types: 12 Cans of beer per week     Comment: 12 beers per week    Drug use: Never       Review of patient's allergies indicates:   Allergen Reactions    Benicar [olmesartan] Other (See Comments)     Gave him severe fatigue and muscle cramps.     Lisinopril Other (See Comments)     Fatigue and insomnia    Metoprolol Shortness Of Breath       Review of Systems:  General ROS: negative for - fever  Psychological ROS: negative for - hostility  Hematological and Lymphatic ROS: negative for - bleeding problems  Endocrine ROS: negative for - unexpected weight changes  Respiratory ROS: no cough, shortness of breath, or wheezing  Cardiovascular ROS: no chest pain or dyspnea on exertion  Gastrointestinal ROS: no abdominal pain, change in bowel habits, or black or bloody stools  Musculoskeletal ROS: negative for - muscular weakness  Neurological ROS: negative for - bowel and bladder control changes or numbness/tingling  Dermatological ROS: negative for rash    Physical Exam:  Vitals:    07/15/22 1101   BP: (!) 143/82   Pulse: (!) 48   Weight: 131.4 kg (289 lb 11 oz)   Height: 5' 10" (1.778 m)   PainSc: 0-No pain   PainLoc: Back     Body mass index is 41.57 kg/m².     Gen: NAD  Psych: mood appropriate for given condition  CV: 2+ radial pulse  HEENT: anicteric   Respiratory: non labored  Abd: soft nt, nd  Gait: gait intact  ROM: limited AROM of the L spine in all " planes, full ROM at ankles, knees and hips  Lumbar flexion 90 degrees, extension 50 degrees, side bending 30 degrees.    Sensation: intact to light touch in all dermatomes tested from L2-S1 bilaterally  Reflexes: 2+ right patella 3+ left patella, 2+ right achilles and 1+ left achilles  Palpation: Diffusely tender over lumbar paraspinals  -TTP over the b/l greater trochanters and bilateral SI joint  Tone: normal in the b/l knees and hips   Skin: intact  Extremities: No edema in b/l ankles or hands  Provacative tests: + bilateral axial facet loading       Right Left   L2/3 Iliacus Hip flexion  5  5   L3/4 Qudratus Femoris Knee Extension  5  5   L4/5 Tib Anterior Ankle Dorsiflexion   5  5   L5/S1 Extensor Hallicus Longus Great toe extension  5  5                 S1/S2 Gastroc/Soleus Plantar Flexion  5  5     Imaging:  U/KANCHAN Harris 1/26/21  Impression:     No evidence of acute deep venous thrombosis in the left or right lower extremity veins.    Xray cervical spine 7/9/20  FINDINGS:  The neck is short.  Even on swimmer's view C7-T1 is not well seen.  The patient has had prior anterior cervical fusion performed at C3-4 and C4-5 with anterior plate and screws and interbody device is noted.  There appears to be bone union.  There is disc space narrowing identified at C5-6 and probably C6-7 on one view.  Neural foraminal stenosis is demonstrated at every level from C3 down.    Xray lumbar spine 2/3/21  FINDINGS:  Osseous demineralization is noted.  No acute fracture or dislocation is noted.  Facet arthropathy is noted at L3-L4, L4-L5, and L5-S1 levels.  Intervertebral disc height loss noted at the L3-L4 and L5-S1 levels.  Atherosclerotic aortic changes are noted.  No change in alignment is noted upon flexion and extension.  Vertebral body heights appear well preserved.    MRI cervical spine 2/4/21  FINDINGS:  CORD: Slightly decreased cord caliber through mid lower cervical spine.  Myelomalacia at the superior C3 and C3-4  levels.    ALIGNMENT: Trace anterolisthesis C7-T1 lateral masses of C1 and C2 are congruent.    BONES: C3-5 ACDF.  Vertebral body heights are maintained.  Mixed type 1 and 2 endplate changes at C6-7.  No aggressive bone marrow signal.    PARASPINAL AREA: Normal.    CERVICAL DISC LEVELS: Prominent diffuse congenital spinal canal narrowing.    C2-C3: Mild disc osteophyte complex.  Moderate left facet hypertrophy.  Preserved ventral and dorsal CSF.  Mild left foraminal stenosis.  C3-C4: Fused.  Mild-moderate disc osteophyte complex.  Mild-moderate left facet hypertrophy.  Ligamentum flavum thickening.  Mild-moderate cord flattening with thin sliver of preserved ventral and dorsal CSF.  Severe bilateral foraminal stenosis.  C4-C5: Fused.  Mild-moderate disc osteophyte complex.  Mild ventral cord flattening, greatest on the right.  Thin sliver preserved ventral and dorsal CSF.  Moderate-severe bilateral foraminal stenosis.  C5-C6: Moderate disc osteophyte complex.  Mild ventral cord flattening with thin sliver preserved ventral and preserved dorsal CSF.  Severe bilateral foraminal stenosis.  C6-C7: Moderate disc osteophyte complex.  Mild left and minimal right facet hypertrophy.  Ligamentum flavum thickening.  Mild ventral cord flattening, greater on the right.  Effacement of ventral and thin sliver preserved dorsal CSF.  Severe bilateral foraminal stenosis.  C7-T1: Mild disc osteophyte complex. Moderate bilateral facet hypertrophy.  Preserved CSF surrounding the cord.  Moderate bilateral foraminal stenosis.    Labs:  BMP  Lab Results   Component Value Date     03/03/2022    K 4.4 03/03/2022     03/03/2022    CO2 26 03/03/2022    BUN 10 03/03/2022    CREATININE 0.7 03/03/2022    CALCIUM 9.1 03/03/2022    ANIONGAP 13 03/03/2022    ESTGFRAFRICA >60.0 03/03/2022    EGFRNONAA >60.0 03/03/2022     Lab Results   Component Value Date    ALT 36 03/03/2022    AST 24 03/03/2022    ALKPHOS 83 03/03/2022    BILITOT 0.5  03/03/2022       Assessment:   Problem List Items Addressed This Visit        Neuro    Lumbar spondylosis - Primary    Relevant Orders    Ambulatory referral/consult to Physical/Occupational Therapy    Case Request Operating Room: Radiofrequency Ablation, Nerve, Spinal, Lumbar, Medial Branch, L3/4, L4/5 (Completed)      Other Visit Diagnoses     Dorsalgia, unspecified              66 y.o. year old male with PMH HTN, PAD presents to the office with neck and back pain.  He reports prior anterior cervical fusion in 2014 and prior lumbar laminectomy in 2016.  Today he says he doesn't feel like he got much relief with either surgery.  Today his pain is 9/10 mostly in his axial lower back, intermittent, aching.  He has burning in between his shoulder blades and still feels burning into his hands.  He denies any weakness, numbness, or bowel/bladder dysfunction    7/15/2022: Bossman Duke returns to the clinic for follow up.  He is reporting return of his lower back pain, constant, 5-8/10, worse with activities such as walking and standing extended distances in addition to household chores such as dishes and cooking.  He denies any radiating pain, numbness, tingling or any weakness.  He denies any changes to his bowel or bladder function.  Over the past year he has been working closely with his cardiologist and now has good control of his cardiovascular conditions.    - on exam he has full strength.  He has increased pain with bilateral axial facet loading.  - I independently reviewed his lumbar x-ray and he has bilateral facet arthropathy at L3/4 and L4/5.  - his pain is limiting his mobility interfering with his ADLs.  - will schedule him for repeat bilateral L3/4 and L4/5 radiofrequency ablation.  He has done well with these in the past giving him greater than 50% relief lasting over a year.  - additionally, referral given for physical therapy to improve function and strength, and to receive training towards establishing  a safe and effective home exercise program (HEP)  - he has worked closely with his cardiologist and he reports most of his cardiovascular issues well under control at this time.  He has recently gained 40 lb her due to his heart medications but he is actively working to lose that weight currently.  - follow-up 4 weeks post procedure    : Not applicable    The above note was completed, in part, with the aid of Dragon dictation software/hardware. Translation errors may be present.

## 2022-07-29 ENCOUNTER — TELEPHONE (OUTPATIENT)
Dept: PAIN MEDICINE | Facility: CLINIC | Age: 67
End: 2022-07-29

## 2022-07-29 NOTE — TELEPHONE ENCOUNTER
----- Message from Emy Etienne sent at 7/29/2022  4:28 PM CDT -----  Who Called: Patient    What is the reqeust in detail: Requesting call back to reschedule his procedure. Please advise.     Can the clinic reply by [facility]? No    Best Call Back Number: [phone]    Additional Information: Call back on Monday is fine.

## 2022-08-02 ENCOUNTER — TELEPHONE (OUTPATIENT)
Dept: PAIN MEDICINE | Facility: CLINIC | Age: 67
End: 2022-08-02
Payer: MEDICARE

## 2022-08-02 NOTE — TELEPHONE ENCOUNTER
----- Message from Piedad Yu sent at 8/2/2022  4:35 PM CDT -----  Type:  Patient Call Back    Who Called: Pt     What is the reqeust in detail: Pt is requesting a call back in regard to r/s procedure. Please advise     Can the clinic reply by [facility]?    Best Call Back Number:[phone]

## 2022-08-04 ENCOUNTER — OFFICE VISIT (OUTPATIENT)
Dept: FAMILY MEDICINE | Facility: CLINIC | Age: 67
End: 2022-08-04
Payer: MEDICARE

## 2022-08-04 VITALS
HEIGHT: 70 IN | SYSTOLIC BLOOD PRESSURE: 120 MMHG | BODY MASS INDEX: 41.8 KG/M2 | WEIGHT: 292 LBS | DIASTOLIC BLOOD PRESSURE: 84 MMHG | HEART RATE: 64 BPM

## 2022-08-04 DIAGNOSIS — E87.70 HYPERVOLEMIA, UNSPECIFIED HYPERVOLEMIA TYPE: ICD-10-CM

## 2022-08-04 DIAGNOSIS — R73.03 PREDIABETES: Primary | ICD-10-CM

## 2022-08-04 LAB
ALBUMIN SERPL BCP-MCNC: 3.9 G/DL (ref 3.5–5.2)
ALP SERPL-CCNC: 98 U/L (ref 55–135)
ALT SERPL W/O P-5'-P-CCNC: 33 U/L (ref 10–44)
ANION GAP SERPL CALC-SCNC: 10 MMOL/L (ref 8–16)
AST SERPL-CCNC: 19 U/L (ref 10–40)
BILIRUB SERPL-MCNC: 0.5 MG/DL (ref 0.1–1)
BUN SERPL-MCNC: 19 MG/DL (ref 8–23)
CALCIUM SERPL-MCNC: 10.2 MG/DL (ref 8.7–10.5)
CHLORIDE SERPL-SCNC: 104 MMOL/L (ref 95–110)
CO2 SERPL-SCNC: 25 MMOL/L (ref 23–29)
CREAT SERPL-MCNC: 0.9 MG/DL (ref 0.5–1.4)
EST. GFR  (NO RACE VARIABLE): >60 ML/MIN/1.73 M^2
ESTIMATED AVG GLUCOSE: 128 MG/DL (ref 68–131)
GLUCOSE SERPL-MCNC: 109 MG/DL (ref 70–110)
HBA1C MFR BLD: 6.1 % (ref 4–5.6)
MAGNESIUM SERPL-MCNC: 2.2 MG/DL (ref 1.6–2.6)
POTASSIUM SERPL-SCNC: 5.3 MMOL/L (ref 3.5–5.1)
PROT SERPL-MCNC: 7.4 G/DL (ref 6–8.4)
SODIUM SERPL-SCNC: 139 MMOL/L (ref 136–145)

## 2022-08-04 PROCEDURE — 36415 COLL VENOUS BLD VENIPUNCTURE: CPT | Mod: S$GLB,,, | Performed by: PHYSICIAN ASSISTANT

## 2022-08-04 PROCEDURE — 1101F PR PT FALLS ASSESS DOC 0-1 FALLS W/OUT INJ PAST YR: ICD-10-PCS | Mod: CPTII,S$GLB,, | Performed by: PHYSICIAN ASSISTANT

## 2022-08-04 PROCEDURE — 3288F PR FALLS RISK ASSESSMENT DOCUMENTED: ICD-10-PCS | Mod: CPTII,S$GLB,, | Performed by: PHYSICIAN ASSISTANT

## 2022-08-04 PROCEDURE — 3044F HG A1C LEVEL LT 7.0%: CPT | Mod: CPTII,S$GLB,, | Performed by: PHYSICIAN ASSISTANT

## 2022-08-04 PROCEDURE — 3008F PR BODY MASS INDEX (BMI) DOCUMENTED: ICD-10-PCS | Mod: CPTII,S$GLB,, | Performed by: PHYSICIAN ASSISTANT

## 2022-08-04 PROCEDURE — 3079F DIAST BP 80-89 MM HG: CPT | Mod: CPTII,S$GLB,, | Performed by: PHYSICIAN ASSISTANT

## 2022-08-04 PROCEDURE — 36415 COLL VENOUS BLD VENIPUNCTURE: CPT | Performed by: NURSE PRACTITIONER

## 2022-08-04 PROCEDURE — 83036 HEMOGLOBIN GLYCOSYLATED A1C: CPT | Performed by: PHYSICIAN ASSISTANT

## 2022-08-04 PROCEDURE — 80053 COMPREHEN METABOLIC PANEL: CPT | Performed by: NURSE PRACTITIONER

## 2022-08-04 PROCEDURE — 99214 OFFICE O/P EST MOD 30 MIN: CPT | Mod: S$GLB,,, | Performed by: PHYSICIAN ASSISTANT

## 2022-08-04 PROCEDURE — 1101F PT FALLS ASSESS-DOCD LE1/YR: CPT | Mod: CPTII,S$GLB,, | Performed by: PHYSICIAN ASSISTANT

## 2022-08-04 PROCEDURE — 3008F BODY MASS INDEX DOCD: CPT | Mod: CPTII,S$GLB,, | Performed by: PHYSICIAN ASSISTANT

## 2022-08-04 PROCEDURE — 3288F FALL RISK ASSESSMENT DOCD: CPT | Mod: CPTII,S$GLB,, | Performed by: PHYSICIAN ASSISTANT

## 2022-08-04 PROCEDURE — 1126F PR PAIN SEVERITY QUANTIFIED, NO PAIN PRESENT: ICD-10-PCS | Mod: CPTII,S$GLB,, | Performed by: PHYSICIAN ASSISTANT

## 2022-08-04 PROCEDURE — 3044F PR MOST RECENT HEMOGLOBIN A1C LEVEL <7.0%: ICD-10-PCS | Mod: CPTII,S$GLB,, | Performed by: PHYSICIAN ASSISTANT

## 2022-08-04 PROCEDURE — 1126F AMNT PAIN NOTED NONE PRSNT: CPT | Mod: CPTII,S$GLB,, | Performed by: PHYSICIAN ASSISTANT

## 2022-08-04 PROCEDURE — 3074F SYST BP LT 130 MM HG: CPT | Mod: CPTII,S$GLB,, | Performed by: PHYSICIAN ASSISTANT

## 2022-08-04 PROCEDURE — 1159F MED LIST DOCD IN RCRD: CPT | Mod: CPTII,S$GLB,, | Performed by: PHYSICIAN ASSISTANT

## 2022-08-04 PROCEDURE — 83735 ASSAY OF MAGNESIUM: CPT | Performed by: NURSE PRACTITIONER

## 2022-08-04 PROCEDURE — 1159F PR MEDICATION LIST DOCUMENTED IN MEDICAL RECORD: ICD-10-PCS | Mod: CPTII,S$GLB,, | Performed by: PHYSICIAN ASSISTANT

## 2022-08-04 PROCEDURE — 99214 PR OFFICE/OUTPT VISIT, EST, LEVL IV, 30-39 MIN: ICD-10-PCS | Mod: S$GLB,,, | Performed by: PHYSICIAN ASSISTANT

## 2022-08-04 PROCEDURE — 83880 ASSAY OF NATRIURETIC PEPTIDE: CPT | Performed by: NURSE PRACTITIONER

## 2022-08-04 PROCEDURE — 3079F PR MOST RECENT DIASTOLIC BLOOD PRESSURE 80-89 MM HG: ICD-10-PCS | Mod: CPTII,S$GLB,, | Performed by: PHYSICIAN ASSISTANT

## 2022-08-04 PROCEDURE — 36415 PR COLLECTION VENOUS BLOOD,VENIPUNCTURE: ICD-10-PCS | Mod: S$GLB,,, | Performed by: PHYSICIAN ASSISTANT

## 2022-08-04 PROCEDURE — 4010F PR ACE/ARB THEARPY RXD/TAKEN: ICD-10-PCS | Mod: CPTII,S$GLB,, | Performed by: PHYSICIAN ASSISTANT

## 2022-08-04 PROCEDURE — 3074F PR MOST RECENT SYSTOLIC BLOOD PRESSURE < 130 MM HG: ICD-10-PCS | Mod: CPTII,S$GLB,, | Performed by: PHYSICIAN ASSISTANT

## 2022-08-04 PROCEDURE — 4010F ACE/ARB THERAPY RXD/TAKEN: CPT | Mod: CPTII,S$GLB,, | Performed by: PHYSICIAN ASSISTANT

## 2022-08-04 NOTE — PROGRESS NOTES
Subjective:       Patient ID: Bossman Duke is a 67 y.o. male.    Chief Complaint: Follow-up    Bossman Duke is a 66 yo male well known to me. He is here today for a 3 month check up. He voices no acute complaints.     Patient Active Problem List:     Tobacco use disorder     Seasonal allergies     Essential hypertension     SOB (shortness of breath)     PVD (peripheral vascular disease)     Lumbar spondylosis     COPD (chronic obstructive pulmonary disease)     Atrial fibrillation     Left-sided low back pain with left-sided sciatica    Conditions are stable. He is followed by physiatry, pulmonary, and cardiology.     Past Medical History:  No date: Anticoagulant long-term use  No date: Arthritis  No date: COPD (chronic obstructive pulmonary disease)  No date: Hypertension  No date: PAD (peripheral artery disease)  No date: PUD (peptic ulcer disease)      Review of Systems   Constitutional: Negative for activity change, chills, fatigue and fever.   HENT: Negative.    Eyes: Negative.    Respiratory: Negative for apnea, cough, chest tightness, shortness of breath and wheezing.    Cardiovascular: Negative for chest pain, palpitations, leg swelling and claudication.   Gastrointestinal: Negative for abdominal pain, blood in stool, diarrhea and nausea.   Endocrine: Negative.    Genitourinary: Negative.    Musculoskeletal: Positive for back pain.   Integumentary:  Negative.   Allergic/Immunologic: Negative.    Neurological: Negative for dizziness, tremors, seizures, headaches and memory loss.   Hematological: Negative for adenopathy.         Objective:      Physical Exam  Vitals reviewed.   Constitutional:       General: He is not in acute distress.     Appearance: Normal appearance. He is not ill-appearing, toxic-appearing or diaphoretic.   HENT:      Head: Normocephalic and atraumatic.      Right Ear: Tympanic membrane, ear canal and external ear normal. There is no impacted cerumen.      Left Ear: Tympanic membrane,  ear canal and external ear normal. There is no impacted cerumen.      Nose: Nose normal. No congestion or rhinorrhea.      Mouth/Throat:      Mouth: Mucous membranes are moist.   Neck:      Vascular: No carotid bruit.   Cardiovascular:      Rate and Rhythm: Normal rate and regular rhythm.      Pulses: Normal pulses.      Heart sounds: Normal heart sounds. No murmur heard.    No friction rub. No gallop.   Pulmonary:      Effort: Pulmonary effort is normal. No respiratory distress.      Breath sounds: Normal breath sounds. No stridor. No wheezing, rhonchi or rales.   Chest:      Chest wall: No tenderness.   Abdominal:      General: Abdomen is flat. There is no distension.      Palpations: There is no mass.      Tenderness: There is no abdominal tenderness. There is no right CVA tenderness, left CVA tenderness, guarding or rebound.      Hernia: No hernia is present.   Musculoskeletal:         General: Normal range of motion.      Cervical back: No rigidity or tenderness.   Lymphadenopathy:      Cervical: No cervical adenopathy.   Skin:     General: Skin is warm and dry.   Neurological:      General: No focal deficit present.      Mental Status: He is alert.   Psychiatric:         Mood and Affect: Mood normal.         Assessment:       Problem List Items Addressed This Visit    None     Visit Diagnoses     Prediabetes    -  Primary    Relevant Orders    Hemoglobin A1C    Hypervolemia, unspecified hypervolemia type              Plan:       Prediabetes  -     Hemoglobin A1C; Future; Expected date: 08/04/2022    Hypervolemia, unspecified hypervolemia type  -     Magnesium  -     NT-Pro Natriuretic Peptide  -     Comprehensive metabolic panel       I spent 30 minutes on this encounter, time includes face-to-face, chart review, documentation, test review and orders.

## 2022-08-05 DIAGNOSIS — R73.03 PREDIABETES: Primary | ICD-10-CM

## 2022-08-05 RX ORDER — METFORMIN HYDROCHLORIDE 500 MG/1
500 TABLET, EXTENDED RELEASE ORAL
Qty: 90 TABLET | Refills: 3 | Status: SHIPPED | OUTPATIENT
Start: 2022-08-05 | End: 2023-03-15

## 2022-08-06 LAB — NT-PROBNP SERPL IA-MCNC: 88 PG/ML

## 2022-08-09 ENCOUNTER — TELEPHONE (OUTPATIENT)
Dept: FAMILY MEDICINE | Facility: CLINIC | Age: 67
End: 2022-08-09
Payer: MEDICARE

## 2022-08-09 NOTE — TELEPHONE ENCOUNTER
Called pt, notified of results per Bossman Damon III, PA-C , he verbally understood. He stated he was worried about all the side effects. But he will try it and let us know. Declines use of glucometer and did not want to get one at this time.

## 2022-08-09 NOTE — TELEPHONE ENCOUNTER
----- Message from Darcy Lu sent at 8/9/2022  8:57 AM CDT -----  Type: Patient Call Back         Who called: Patient         What is the request in detail: received a Rx of metFORMIN (GLUCOPHAGE-XR) 500 MG ER 24hr tablet and wanted to discuss the reason for Rx; states he was confused on why it was prescribed; please  advise      Best call back number: [phone]         Additional Information:            Thank You

## 2022-08-18 ENCOUNTER — PATIENT OUTREACH (OUTPATIENT)
Dept: ADMINISTRATIVE | Facility: HOSPITAL | Age: 67
End: 2022-08-18
Payer: MEDICARE

## 2022-08-18 NOTE — PROGRESS NOTES
Non-compliant report chart audits for HYPERTENSION MANAGEMENT    Outreach to patient in reference to hypertension management    RE:  Patient hypertension management    Pt recent bP reading 120/84 on 8/4/2022    Outreach:  Hypertension Management

## 2022-08-23 ENCOUNTER — HOSPITAL ENCOUNTER (OUTPATIENT)
Facility: HOSPITAL | Age: 67
Discharge: HOME | End: 2022-08-23
Attending: ANESTHESIOLOGY | Admitting: ANESTHESIOLOGY
Payer: MEDICARE

## 2022-08-23 ENCOUNTER — HOSPITAL ENCOUNTER (OUTPATIENT)
Dept: RADIOLOGY | Facility: HOSPITAL | Age: 67
Discharge: HOME | End: 2022-08-23
Attending: ANESTHESIOLOGY | Admitting: ANESTHESIOLOGY
Payer: MEDICARE

## 2022-08-23 DIAGNOSIS — M51.36 DDD (DEGENERATIVE DISC DISEASE), LUMBAR: ICD-10-CM

## 2022-08-23 DIAGNOSIS — M47.816 LUMBAR SPONDYLOSIS: Primary | ICD-10-CM

## 2022-08-23 LAB
CTP QC/QA: YES
SARS-COV-2 AG RESP QL IA.RAPID: NEGATIVE

## 2022-08-23 PROCEDURE — 63600175 PHARM REV CODE 636 W HCPCS: Mod: PO | Performed by: ANESTHESIOLOGY

## 2022-08-23 PROCEDURE — 64635 DESTROY LUMB/SAC FACET JNT: CPT | Mod: 50,,, | Performed by: ANESTHESIOLOGY

## 2022-08-23 PROCEDURE — 76000 FLUOROSCOPY <1 HR PHYS/QHP: CPT | Mod: TC,PO

## 2022-08-23 PROCEDURE — 25000003 PHARM REV CODE 250: Mod: PO | Performed by: ANESTHESIOLOGY

## 2022-08-23 PROCEDURE — 99152 PR MOD CONSCIOUS SEDATION, SAME PHYS, 5+ YRS, FIRST 15 MIN: ICD-10-PCS | Mod: ,,, | Performed by: ANESTHESIOLOGY

## 2022-08-23 PROCEDURE — 64636 PR DESTROY L/S FACET JNT ADDL: ICD-10-PCS | Mod: 50,,, | Performed by: ANESTHESIOLOGY

## 2022-08-23 PROCEDURE — 99152 MOD SED SAME PHYS/QHP 5/>YRS: CPT | Mod: ,,, | Performed by: ANESTHESIOLOGY

## 2022-08-23 PROCEDURE — 64635 PR DESTROY LUMB/SAC FACET JNT: ICD-10-PCS | Mod: 50,,, | Performed by: ANESTHESIOLOGY

## 2022-08-23 PROCEDURE — 64635 DESTROY LUMB/SAC FACET JNT: CPT | Mod: 50,PO | Performed by: ANESTHESIOLOGY

## 2022-08-23 PROCEDURE — 64636 DESTROY L/S FACET JNT ADDL: CPT | Mod: PO,LT

## 2022-08-23 PROCEDURE — 64636 DESTROY L/S FACET JNT ADDL: CPT | Mod: 50,,, | Performed by: ANESTHESIOLOGY

## 2022-08-23 PROCEDURE — 64636 DESTROY L/S FACET JNT ADDL: CPT | Mod: PO,RT | Performed by: ANESTHESIOLOGY

## 2022-08-23 RX ORDER — SODIUM CHLORIDE, SODIUM LACTATE, POTASSIUM CHLORIDE, CALCIUM CHLORIDE 600; 310; 30; 20 MG/100ML; MG/100ML; MG/100ML; MG/100ML
INJECTION, SOLUTION INTRAVENOUS CONTINUOUS
Status: DISCONTINUED | OUTPATIENT
Start: 2022-08-23 | End: 2022-08-23 | Stop reason: HOSPADM

## 2022-08-23 RX ORDER — FENTANYL CITRATE 50 UG/ML
INJECTION, SOLUTION INTRAMUSCULAR; INTRAVENOUS
Status: DISCONTINUED | OUTPATIENT
Start: 2022-08-23 | End: 2022-08-23 | Stop reason: HOSPADM

## 2022-08-23 RX ORDER — TRIAMCINOLONE ACETONIDE 40 MG/ML
INJECTION, SUSPENSION INTRA-ARTICULAR; INTRAMUSCULAR
Status: DISCONTINUED | OUTPATIENT
Start: 2022-08-23 | End: 2022-08-23 | Stop reason: HOSPADM

## 2022-08-23 RX ORDER — MIDAZOLAM HYDROCHLORIDE 2 MG/2ML
INJECTION, SOLUTION INTRAMUSCULAR; INTRAVENOUS
Status: DISCONTINUED | OUTPATIENT
Start: 2022-08-23 | End: 2022-08-23 | Stop reason: HOSPADM

## 2022-08-23 RX ORDER — LIDOCAINE HYDROCHLORIDE 10 MG/ML
INJECTION, SOLUTION EPIDURAL; INFILTRATION; INTRACAUDAL; PERINEURAL
Status: DISCONTINUED | OUTPATIENT
Start: 2022-08-23 | End: 2022-08-23 | Stop reason: HOSPADM

## 2022-08-23 RX ORDER — BUPIVACAINE HYDROCHLORIDE 2.5 MG/ML
INJECTION, SOLUTION EPIDURAL; INFILTRATION; INTRACAUDAL
Status: DISCONTINUED | OUTPATIENT
Start: 2022-08-23 | End: 2022-08-23 | Stop reason: HOSPADM

## 2022-08-23 RX ADMIN — SODIUM CHLORIDE, SODIUM LACTATE, POTASSIUM CHLORIDE, AND CALCIUM CHLORIDE: .6; .31; .03; .02 INJECTION, SOLUTION INTRAVENOUS at 11:08

## 2022-08-23 NOTE — OP NOTE
Procedure Note    Procedure Date: 8/23/2022    Procedure Performed:  Radiofrequency ablation of the L3/4 and L4/5 medial branch nerves on the  bilateral side utilizing fluoroscopy    Indication: The patient has clinical and radiologic findings suggestive of recurrent facet mediated pain.  The patient is s/p bilateral lumbar L3/4 and L4/5 RFA with over 50% improvement of their pain lasting at least 6 months.     Pre-op diagnosis: Lumbar Spondylosis    Post-op diagnosis: same    Physician: Linwood Aponte MD    IV Sedation medications: Moderate sedation was achieved with midazolam 2 mg and fentanyl 50 mcg.  Continuous monitoring of EKG, blood pressure and pulse oximetry was provided by a registered nurse during the entire course of the procedure under my supervision and recorded in the patient's medical record.   Total time for sedation was 39 minutes.    Medications injected:  Pre-lesion, 1ml of 2% lidocaine at each level.  From a mixture of 5ml of 0.25% bupivacaine and 40mg of methylprednisolone, 1ml of this solution was injected at each level post-lesion.    Local anesthetic used: 1% Lidocaine, 5 ml, 8.4% sodium bicarbonate 0.25ml    Estimated Blood Loss: none    Complications:  none    Technique:  The patient was interviewed in the holding area and Risks/Benefits were discussed, including, but not limited to, the possibility of new or different pain, bleeding or infection.   All questions were answered.  The patient understood and accepted risks.  Consent was reviewed.  A time out was taken to identify the patient, procedure and side of the procedure. The patient was placed in a prone position, then prepped and draped in the usual sterile fashion using ChloraPrep and sterile towels.  The levels were determined under fluoroscopic guidance and then marked.  AP and oblique fluoroscopy were used to identify and parker the junctions between the superior articular processes and transverse processes at the L2-4 levels on the  Bilateral side.   The skin and subcutaneous tissues in these identified areas were anesthetized with 1% lidocaine. A 20-gauge 100 mm discoapi RF needle at L2 and 150mm and L3 and L4 was advanced towards each of these points under fluoroscopic guidance such that the tip of the needle was positioned posterior to the Neuro-foramen on lateral fluoroscopic view.  Then, each needle was positioned such that, on stimulation, the patient had an appropriate pain response between 0.3-0.5 V, with no motor response, other than Lumbar Paraspinal Muscles up to 2.0V.  One mL of 2% lidocaine was then injected at each level prior to lesioning, which was performed for 90 seconds at 80°C.  Once the lesion was complete, 1 mL of a solution consisting of 5 mL 0.25% bupivacaine and 40mg methylprednisolone was injected through each probe. The probes were removed with a 1% lidocaine flush.  The patient tolerated the procedure well.  The patient was monitored after the procedure.  Patient was given post procedure and discharge instructions to follow at home.  The patient was discharged in a stable condition.    Event Time In   In Facility 1052   In Pre-Procedure 1122   Physician Available    Anesthesia Available    Pre-Op: Bedside Procedure Start    Pre-Op: Bedside Procedure Stop    Pre-Procedure Complete 1147   Out of Pre-Procedure    Anesthesia Start    Anesthesia Start Data Collection    Setup Start    Setup Complete    In Room 1306   Prep Start    Procedure Prep Complete    Procedure Start 1311   Procedure Closing    Emergence    Procedure Finish 1341   Sedation Start 1305   Scope In    Extent Reached    Scope Out    Sedation End    Out of Room 1344   Cleanup Start 1344   Cleanup Complete    Cosmetic Start    Cosmetic Stop    Pain Mgmt In Room    Pain Mgmt Out Room    In Recovery    Anesthesia Finish    Bedside Procedure Start    Bedside Procedure Stop    Recovery Care Complete    Out of Recovery    To Phase II    In Phase II    Pain Mgmt  Recovery Start    Pain Mgmt Recovery Stop    Obs Rec Start    Obs Rec Stop    Phase II Care Complete    Out of Phase II    Procedural Care Complete    Discharge    Pain Follow Up Needed    Pain Follow Up Complete

## 2022-08-23 NOTE — H&P
[facility] - Surgery  History & Physical - Short Stay  Pain Management       SUBJECTIVE:     Procedure: Procedure(s) (LRB):  Radiofrequency Ablation, Nerve, Spinal, Lumbar, Medial Branch, L3/4, L4/5 (Bilateral)    Chief Complaint/Reason for Admission:  Lumbar spondylosis [M47.816]    PTA Medications   Medication Sig    aspirin (ECOTRIN) 81 MG EC tablet Take 81 mg by mouth once daily.    clopidogreL (PLAVIX) 75 mg tablet TAKE ONE TABLET BY MOUTH DAILY    cyanocobalamin (VITAMIN B-12) 100 MCG tablet Take 100 mcg by mouth once daily.    famotidine (PEPCID) 20 MG tablet Take 20 mg by mouth as needed.    furosemide (LASIX) 40 MG tablet Take 1 tablet (40 mg total) by mouth daily as needed (as needed for leg swelling). (Patient taking differently: Take 40 mg by mouth once daily. Except on Tuesday)    metFORMIN (GLUCOPHAGE-XR) 500 MG ER 24hr tablet Take 1 tablet (500 mg total) by mouth daily with breakfast.    metOLazone (ZAROXOLYN) 2.5 MG tablet Take 1 tablet (2.5 mg total) by mouth once a week.    multivit-mins no.63/iron/folic (M-VIT ORAL) Take 1 tablet by mouth once daily.    NIFEdipine (PROCARDIA-XL) 60 MG (OSM) 24 hr tablet Take 1 tablet (60 mg total) by mouth every evening.    tiZANidine (ZANAFLEX) 4 MG tablet Take 4 mg by mouth every evening.    valsartan (DIOVAN) 320 MG tablet Take 1 tablet (320 mg total) by mouth once daily.    fluticasone-umeclidin-vilanter (TRELEGY ELLIPTA) 100-62.5-25 mcg DsDv Inhale 1 puff into the lungs once daily.       Review of patient's allergies indicates:   Allergen Reactions    Benicar [olmesartan] Other (See Comments)     Gave him severe fatigue and muscle cramps.     Lisinopril Other (See Comments)     Fatigue and insomnia    Metoprolol Shortness Of Breath       Past Medical History:   Diagnosis Date    Anticoagulant long-term use     Arthritis     COPD (chronic obstructive pulmonary disease)     Hypertension     PAD (peripheral artery disease)     PUD (peptic  "ulcer disease)      Past Surgical History:   Procedure Laterality Date    APPENDECTOMY      CERVICAL SPINE SURGERY      EYE SURGERY Left     "as  a child"    INJECTION OF ANESTHETIC AGENT AROUND MEDIAL BRANCH NERVES INNERVATING LUMBAR FACET JOINT Bilateral 2/9/2021    Procedure: Block-nerve-medial branch-lumbar L3/4, L4/5, L5/S1;  Surgeon: Linwood Aponte MD;  Location: [facility] OR;  Service: Pain Management;  Laterality: Bilateral;    LUMBAR DISC SURGERY      PERCUTANEOUS TRANSLUMINAL ANGIOPLASTY N/A 2/15/2021    Procedure: Pta (Angioplasty, Percutaneous, Transluminal);  Surgeon: Jl Neil MD;  Location: [facility];  Service: Cardiology;  Laterality: N/A;    RADIOFREQUENCY ABLATION Right 3/1/2021    Procedure: Radiofrequency Ablation L3/4, L4/5;  Surgeon: Linwood Aponte MD;  Location: [facility] OR;  Service: Pain Management;  Laterality: Right;    RADIOFREQUENCY ABLATION Left 3/10/2021    Procedure: Radiofrequency Ablation L3/4, L4/5;  Surgeon: Linwood Aponte MD;  Location: [facility] OR;  Service: Pain Management;  Laterality: Left;     Family History   Problem Relation Age of Onset    Diabetes Mother     Heart disease Mother     Heart disease Father     Glaucoma Neg Hx     Macular degeneration Neg Hx     Retinal detachment Neg Hx      Social History     Tobacco Use    Smoking status: Former Smoker     Packs/day: 0.25    Smokeless tobacco: Never Used    Tobacco comment: Quit about 2 months ago in Feb 2021   Substance Use Topics    Alcohol use: Yes     Alcohol/week: 2.0 standard drinks     Types: 2 Cans of beer per week     Comment: every other week    Drug use: Never        Current Facility-Administered Medications:     lactated ringers infusion, , Intravenous, Continuous, Linwood Aponte MD, Last Rate: 25 mL/hr at 08/23/22 1147, New Bag at 08/23/22 1147    Review of Systems:  General ROS: negative for - fever  Dermatological ROS: negative for rash    OBJECTIVE:     Vital Signs (Most " Recent):  Temp: 98.1 °F (36.7 °C) (08/23/22 1148)  Pulse: 81 (08/23/22 1148)  Resp: 17 (08/23/22 1148)  BP: (!) 161/81 (08/23/22 1148)  SpO2: 96 % (08/23/22 1148)  Body mass index is 41.47 kg/m².    Physical Exam:  General appearance - alert, well appearing, and in no distress  Mental status - AOx3  Eyes - pupils equal and reactive, extraocular eye movements intact  Heart - normal rate, regular rhythm, normal S1, S2, no murmurs, rubs, clicks or gallops  Chest - clear to auscultation, no wheezes, rales or rhonchi, symmetric air entry  Abdomen - soft, nontender, nondistended, no masses or organomegaly  Neurological - alert, oriented, normal speech, no focal findings or movement disorder noted  Extremities - peripheral pulses normal, no pedal edema, no clubbing or cyanosis      ASSESSMENT/PLAN:     There are no hospital problems to display for this patient.     No changes since seen on 7/15/22.  We will proceed with repeat bilateral L3/4 and L4/5 RFA. The risks and benefits of this intervention, and alternative therapies were discussed with the patient.  The discussion of risks included infection, bleeding, need for additional procedures or surgery, nerve damage.  Questions regarding the procedure, risks, expected outcome, and possible side effects were solicited and answered to the patient's satisfaction.  [facility] wishes to proceed with the injection or procedure.  Written consent was obtained.    Proceed with intervention as scheduled.    Linwood Aponte M.D.  Interventional Pain Medicine / Anesthesiology

## 2022-08-23 NOTE — DISCHARGE INSTRUCTIONS

## 2022-08-23 NOTE — DISCHARGE SUMMARY
[facility]  Discharge Note  Short Stay    Admit Date: 8/23/2022    Discharge Date: 8/23/2022    Attending Physician: Linwood Aponte     Discharge Provider: Linwood Aponte    Diagnoses:  There are no hospital problems to display for this patient.      Discharged Condition: Good    Final Diagnoses: Lumbar spondylosis [M47.816]    Disposition: Home or Self Care    Hospital Course: No complications, uneventful    Outcome of Hospitalization, Treatment, Procedure, or Surgery:  Patient was admitted for outpatient interventional pain management procedure. The patient tolerated the procedure well with no complications.    Follow up/Patient Instructions:  Follow up as scheduled in Pain Management office in 2-3 weeks.  Patient has received instructions and follow up date and time.    Medications:  Continue previous medications    Discharge Procedure Orders   Notify your health care provider if you experience any of the following:  temperature >100.4     Notify your health care provider if you experience any of the following:  persistent nausea and vomiting or diarrhea     Notify your health care provider if you experience any of the following:  severe uncontrolled pain     Notify your health care provider if you experience any of the following:  redness, tenderness, or signs of infection (pain, swelling, redness, odor or green/yellow discharge around incision site)     Notify your health care provider if you experience any of the following:  difficulty breathing or increased cough     Notify your health care provider if you experience any of the following:  severe persistent headache     Notify your health care provider if you experience any of the following:  worsening rash     Notify your health care provider if you experience any of the following:  persistent dizziness, light-headedness, or visual disturbances     Notify your health care provider if you experience any of the following:  increased confusion or  weakness     Activity as tolerated

## 2022-08-24 VITALS
HEIGHT: 70 IN | HEART RATE: 86 BPM | TEMPERATURE: 98 F | BODY MASS INDEX: 41.37 KG/M2 | WEIGHT: 289 LBS | DIASTOLIC BLOOD PRESSURE: 75 MMHG | OXYGEN SATURATION: 97 % | SYSTOLIC BLOOD PRESSURE: 167 MMHG | RESPIRATION RATE: 17 BRPM

## 2022-09-09 ENCOUNTER — TELEPHONE (OUTPATIENT)
Dept: CARDIOLOGY | Facility: CLINIC | Age: 67
End: 2022-09-09
Payer: MEDICARE

## 2022-09-14 ENCOUNTER — TELEPHONE (OUTPATIENT)
Dept: CARDIOLOGY | Facility: CLINIC | Age: 67
End: 2022-09-14
Payer: MEDICARE

## 2022-09-20 ENCOUNTER — OFFICE VISIT (OUTPATIENT)
Dept: PAIN MEDICINE | Facility: CLINIC | Age: 67
End: 2022-09-20
Payer: MEDICARE

## 2022-09-20 VITALS
DIASTOLIC BLOOD PRESSURE: 70 MMHG | BODY MASS INDEX: 41.57 KG/M2 | HEART RATE: 80 BPM | OXYGEN SATURATION: 96 % | HEIGHT: 70 IN | SYSTOLIC BLOOD PRESSURE: 144 MMHG | WEIGHT: 290.38 LBS

## 2022-09-20 DIAGNOSIS — M47.816 LUMBAR SPONDYLOSIS: ICD-10-CM

## 2022-09-20 DIAGNOSIS — M54.16 LUMBAR RADICULOPATHY: Primary | ICD-10-CM

## 2022-09-20 DIAGNOSIS — M54.9 DORSALGIA, UNSPECIFIED: ICD-10-CM

## 2022-09-20 PROCEDURE — 4010F ACE/ARB THERAPY RXD/TAKEN: CPT | Mod: CPTII,S$GLB,,

## 2022-09-20 PROCEDURE — 1159F PR MEDICATION LIST DOCUMENTED IN MEDICAL RECORD: ICD-10-PCS | Mod: CPTII,S$GLB,,

## 2022-09-20 PROCEDURE — 1159F MED LIST DOCD IN RCRD: CPT | Mod: CPTII,S$GLB,,

## 2022-09-20 PROCEDURE — 3077F PR MOST RECENT SYSTOLIC BLOOD PRESSURE >= 140 MM HG: ICD-10-PCS | Mod: CPTII,S$GLB,,

## 2022-09-20 PROCEDURE — 3044F PR MOST RECENT HEMOGLOBIN A1C LEVEL <7.0%: ICD-10-PCS | Mod: CPTII,S$GLB,,

## 2022-09-20 PROCEDURE — 3044F HG A1C LEVEL LT 7.0%: CPT | Mod: CPTII,S$GLB,,

## 2022-09-20 PROCEDURE — 99214 OFFICE O/P EST MOD 30 MIN: CPT | Mod: S$GLB,,,

## 2022-09-20 PROCEDURE — 3078F PR MOST RECENT DIASTOLIC BLOOD PRESSURE < 80 MM HG: ICD-10-PCS | Mod: CPTII,S$GLB,,

## 2022-09-20 PROCEDURE — 99999 PR PBB SHADOW E&M-EST. PATIENT-LVL III: CPT | Mod: PBBFAC,,,

## 2022-09-20 PROCEDURE — 3077F SYST BP >= 140 MM HG: CPT | Mod: CPTII,S$GLB,,

## 2022-09-20 PROCEDURE — 1101F PT FALLS ASSESS-DOCD LE1/YR: CPT | Mod: CPTII,S$GLB,,

## 2022-09-20 PROCEDURE — 3078F DIAST BP <80 MM HG: CPT | Mod: CPTII,S$GLB,,

## 2022-09-20 PROCEDURE — 99214 PR OFFICE/OUTPT VISIT, EST, LEVL IV, 30-39 MIN: ICD-10-PCS | Mod: S$GLB,,,

## 2022-09-20 PROCEDURE — 1125F PR PAIN SEVERITY QUANTIFIED, PAIN PRESENT: ICD-10-PCS | Mod: CPTII,S$GLB,,

## 2022-09-20 PROCEDURE — 3008F PR BODY MASS INDEX (BMI) DOCUMENTED: ICD-10-PCS | Mod: CPTII,S$GLB,,

## 2022-09-20 PROCEDURE — 99999 PR PBB SHADOW E&M-EST. PATIENT-LVL III: ICD-10-PCS | Mod: PBBFAC,,,

## 2022-09-20 PROCEDURE — 1125F AMNT PAIN NOTED PAIN PRSNT: CPT | Mod: CPTII,S$GLB,,

## 2022-09-20 PROCEDURE — 3288F FALL RISK ASSESSMENT DOCD: CPT | Mod: CPTII,S$GLB,,

## 2022-09-20 PROCEDURE — 4010F PR ACE/ARB THEARPY RXD/TAKEN: ICD-10-PCS | Mod: CPTII,S$GLB,,

## 2022-09-20 PROCEDURE — 3008F BODY MASS INDEX DOCD: CPT | Mod: CPTII,S$GLB,,

## 2022-09-20 PROCEDURE — 3288F PR FALLS RISK ASSESSMENT DOCUMENTED: ICD-10-PCS | Mod: CPTII,S$GLB,,

## 2022-09-20 PROCEDURE — 1101F PR PT FALLS ASSESS DOC 0-1 FALLS W/OUT INJ PAST YR: ICD-10-PCS | Mod: CPTII,S$GLB,,

## 2022-09-20 RX ORDER — GABAPENTIN 300 MG/1
300 CAPSULE ORAL 2 TIMES DAILY
Qty: 60 CAPSULE | Refills: 1 | Status: SHIPPED | OUTPATIENT
Start: 2022-09-20 | End: 2022-10-20

## 2022-09-20 NOTE — H&P (VIEW-ONLY)
[facility] Follow Up Evaluation    Referred by: Dr. Nelson GAMBLE  Reason for referral: neck pain    CC:   Chief Complaint   Patient presents with    Follow-up      Last 3 PDI Scores 3/26/2021 2/3/2021   Pain Disability Index (PDI) 0 33     Interval HPI 9/20/2022: Bossman Duke returns to the clinic for follow up. He is s/p b/l L3/4 and L4/5 RFA on 08/23/2022 with 0% relief.  Today he rates his pain as a 8/10.  He is also reporting associated pain radiating down bilateral legs into feet, left greater than right, with some burning sensation in bilateral legs.  He states this pain is worsened with physical activity and slightly alleviated with rest.  He denies any numbness, weakness or changes with his bowel or bladder function.  He continues to take daily Tylenol without significant relief of his pain.      Pain Intervention History:  - s/p b/l L3/4 and L4/5 RFA on 3/2021 with 50% relief of his pain.  - s/p b/l L3/4 and L4/5 RFA on 08/23/2022 with 0% relief.    HPI:   Bossman Duke is a 67 y.o. male who complains of neck pain    Onset: at least a few years  Progression: since onset, pain is gradually worsening  Current Pain Score: 8/10  Typical Range: 5-9/10  Timing: intermittent  Quality: dull, aching  Radiation: no  Associated numbness or weakness: no numbness, no weakness   Exacerbated by: standing, back extension  Allievated by: rest  Is Pain Level Acceptable?: No    Previous Therapies:  PT/OT:   HEP:   Interventions:   Surgery:  - h/o cervical fusion 7 years ago  - h/o lumbar likely laminectomy 5 ago  Medications:   - NSAIDS:   - MSK Relaxants:   - TCAs:   - SNRIs:   - Topicals:   - Anticonvulsants:  - Opioids:     History:    Current Outpatient Medications:     aspirin (ECOTRIN) 81 MG EC tablet, Take 81 mg by mouth once daily., Disp: , Rfl:     clopidogreL (PLAVIX) 75 mg tablet, TAKE ONE TABLET BY MOUTH DAILY, Disp: 90 tablet, Rfl: 3    cyanocobalamin (VITAMIN B-12) 100 MCG tablet, Take 100 mcg by  "mouth once daily., Disp: , Rfl:     famotidine (PEPCID) 20 MG tablet, Take 20 mg by mouth as needed., Disp: , Rfl:     fluticasone-umeclidin-vilanter (TRELEGY ELLIPTA) 100-62.5-25 mcg DsDv, Inhale 1 puff into the lungs once daily., Disp: 30 each, Rfl: 11    furosemide (LASIX) 40 MG tablet, Take 1 tablet (40 mg total) by mouth daily as needed (as needed for leg swelling). (Patient taking differently: Take 40 mg by mouth once daily. Except on Tuesday), Disp: 90 tablet, Rfl: 3    metFORMIN (GLUCOPHAGE-XR) 500 MG ER 24hr tablet, Take 1 tablet (500 mg total) by mouth daily with breakfast., Disp: 90 tablet, Rfl: 3    metOLazone (ZAROXOLYN) 2.5 MG tablet, Take 1 tablet (2.5 mg total) by mouth once a week., Disp: 4 tablet, Rfl: 6    multivit-mins no.63/iron/folic (M-VIT ORAL), Take 1 tablet by mouth once daily., Disp: , Rfl:     NIFEdipine (PROCARDIA-XL) 60 MG (OSM) 24 hr tablet, Take 1 tablet (60 mg total) by mouth every evening., Disp: 90 tablet, Rfl: 1    tiZANidine (ZANAFLEX) 4 MG tablet, Take 4 mg by mouth every evening., Disp: , Rfl:     valsartan (DIOVAN) 320 MG tablet, Take 1 tablet (320 mg total) by mouth once daily., Disp: 90 tablet, Rfl: 1    Past Medical History:   Diagnosis Date    Anticoagulant long-term use     Arthritis     COPD (chronic obstructive pulmonary disease)     Hypertension     PAD (peripheral artery disease)     PUD (peptic ulcer disease)        Past Surgical History:   Procedure Laterality Date    APPENDECTOMY      CERVICAL SPINE SURGERY      EYE SURGERY Left     "as  a child"    INJECTION OF ANESTHETIC AGENT AROUND MEDIAL BRANCH NERVES INNERVATING LUMBAR FACET JOINT Bilateral 2/9/2021    Procedure: Block-nerve-medial branch-lumbar L3/4, L4/5, L5/S1;  Surgeon: Linwood Aponte MD;  Location: [facility];  Service: Pain Management;  Laterality: Bilateral;    LUMBAR DISC SURGERY      PERCUTANEOUS TRANSLUMINAL ANGIOPLASTY N/A 2/15/2021    Procedure: Pta (Angioplasty, Percutaneous, Transluminal);  " Surgeon: Jl Neil MD;  Location: [facility];  Service: Cardiology;  Laterality: N/A;    RADIOFREQUENCY ABLATION Right 3/1/2021    Procedure: Radiofrequency Ablation L3/4, L4/5;  Surgeon: Linwood Aponte MD;  Location: [facility] OR;  Service: Pain Management;  Laterality: Right;    RADIOFREQUENCY ABLATION Left 3/10/2021    Procedure: Radiofrequency Ablation L3/4, L4/5;  Surgeon: Linwood Aponte MD;  Location: [facility] OR;  Service: Pain Management;  Laterality: Left;    RADIOFREQUENCY ABLATION OF LUMBAR MEDIAL BRANCH NERVE AT SINGLE LEVEL Bilateral 8/23/2022    Procedure: Radiofrequency Ablation, Nerve, Spinal, Lumbar, Medial Branch, L3/4, L4/5;  Surgeon: Linwood Aponte MD;  Location: [facility] OR;  Service: Pain Management;  Laterality: Bilateral;       Family History   Problem Relation Age of Onset    Diabetes Mother     Heart disease Mother     Heart disease Father     Glaucoma Neg Hx     Macular degeneration Neg Hx     Retinal detachment Neg Hx        Social History     Socioeconomic History    Marital status:    Tobacco Use    Smoking status: Former     Packs/day: 0.25     Types: Cigarettes    Smokeless tobacco: Never    Tobacco comments:     Quit about 2 months ago in Feb 2021   Substance and Sexual Activity    Alcohol use: Yes     Alcohol/week: 2.0 standard drinks     Types: 2 Cans of beer per week     Comment: every other week    Drug use: Never       Review of patient's allergies indicates:   Allergen Reactions    Benicar [olmesartan] Other (See Comments)     Gave him severe fatigue and muscle cramps.     Lisinopril Other (See Comments)     Fatigue and insomnia    Metoprolol Shortness Of Breath       Review of Systems:  General ROS: negative for - fever  Psychological ROS: negative for - hostility  Hematological and Lymphatic ROS: negative for - bleeding problems  Endocrine ROS: negative for - unexpected weight changes  Respiratory ROS: no cough, shortness of breath, or wheezing  Cardiovascular ROS:  "no chest pain or dyspnea on exertion  Gastrointestinal ROS: no abdominal pain, change in bowel habits, or black or bloody stools  Musculoskeletal ROS: negative for - muscular weakness  Neurological ROS: negative for - bowel and bladder control changes or numbness/tingling  Dermatological ROS: negative for rash    Physical Exam:  Vitals:    09/20/22 0956   BP: (!) 144/70   Pulse: 80   SpO2: 96%   Weight: 131.7 kg (290 lb 5.5 oz)   Height: 5' 10" (1.778 m)   PainSc:   4   PainLoc: Back     Body mass index is 41.66 kg/m².     Gen: NAD  Psych: mood appropriate for given condition  CV: 2+ radial pulse  HEENT: anicteric   Respiratory: non labored  Abd: soft nt, nd  Gait: gait intact  ROM: limited AROM of the L spine in all planes, full ROM at ankles, knees and hips  Lumbar flexion 90 degrees, extension 50 degrees, side bending 30 degrees.    Sensation: intact to light touch in all dermatomes tested from L2-S1 bilaterally  Reflexes: 2+ right patella 3+ left patella, 2+ right achilles and 1+ left achilles  Palpation: Diffusely tender over lumbar paraspinals  -TTP over the b/l greater trochanters and bilateral SI joint  Tone: normal in the b/l knees and hips   Skin: intact  Extremities: No edema in b/l ankles or hands  Provacative tests: + bilateral axial facet loading       Right Left   L2/3 Iliacus Hip flexion  5  5   L3/4 Qudratus Femoris Knee Extension  5  5   L4/5 Tib Anterior Ankle Dorsiflexion   5  5   L5/S1 Extensor Hallicus Longus Great toe extension  5  5                 S1/S2 Gastroc/Soleus Plantar Flexion  5  5     Imaging:  U/S BRISEIDA's 1/26/21  Impression:     No evidence of acute deep venous thrombosis in the left or right lower extremity veins.    Xray cervical spine 7/9/20  FINDINGS:  The neck is short.  Even on swimmer's view C7-T1 is not well seen.  The patient has had prior anterior cervical fusion performed at C3-4 and C4-5 with anterior plate and screws and interbody device is noted.  There appears to be " bone union.  There is disc space narrowing identified at C5-6 and probably C6-7 on one view.  Neural foraminal stenosis is demonstrated at every level from C3 down.    Xray lumbar spine 2/3/21  FINDINGS:  Osseous demineralization is noted.  No acute fracture or dislocation is noted.  Facet arthropathy is noted at L3-L4, L4-L5, and L5-S1 levels.  Intervertebral disc height loss noted at the L3-L4 and L5-S1 levels.  Atherosclerotic aortic changes are noted.  No change in alignment is noted upon flexion and extension.  Vertebral body heights appear well preserved.    MRI cervical spine 2/4/21  FINDINGS:  CORD: Slightly decreased cord caliber through mid lower cervical spine.  Myelomalacia at the superior C3 and C3-4 levels.    ALIGNMENT: Trace anterolisthesis C7-T1 lateral masses of C1 and C2 are congruent.    BONES: C3-5 ACDF.  Vertebral body heights are maintained.  Mixed type 1 and 2 endplate changes at C6-7.  No aggressive bone marrow signal.    PARASPINAL AREA: Normal.    CERVICAL DISC LEVELS: Prominent diffuse congenital spinal canal narrowing.    C2-C3: Mild disc osteophyte complex.  Moderate left facet hypertrophy.  Preserved ventral and dorsal CSF.  Mild left foraminal stenosis.  C3-C4: Fused.  Mild-moderate disc osteophyte complex.  Mild-moderate left facet hypertrophy.  Ligamentum flavum thickening.  Mild-moderate cord flattening with thin sliver of preserved ventral and dorsal CSF.  Severe bilateral foraminal stenosis.  C4-C5: Fused.  Mild-moderate disc osteophyte complex.  Mild ventral cord flattening, greatest on the right.  Thin sliver preserved ventral and dorsal CSF.  Moderate-severe bilateral foraminal stenosis.  C5-C6: Moderate disc osteophyte complex.  Mild ventral cord flattening with thin sliver preserved ventral and preserved dorsal CSF.  Severe bilateral foraminal stenosis.  C6-C7: Moderate disc osteophyte complex.  Mild left and minimal right facet hypertrophy.  Ligamentum flavum thickening.   Mild ventral cord flattening, greater on the right.  Effacement of ventral and thin sliver preserved dorsal CSF.  Severe bilateral foraminal stenosis.  C7-T1: Mild disc osteophyte complex. Moderate bilateral facet hypertrophy.  Preserved CSF surrounding the cord.  Moderate bilateral foraminal stenosis.    Labs:  BMP  Lab Results   Component Value Date     08/04/2022    K 5.3 (H) 08/04/2022     08/04/2022    CO2 25 08/04/2022    BUN 19 08/04/2022    CREATININE 0.9 08/04/2022    CALCIUM 10.2 08/04/2022    ANIONGAP 10 08/04/2022    ESTGFRAFRICA >60.0 03/03/2022    EGFRNONAA >60.0 03/03/2022     Lab Results   Component Value Date    ALT 33 08/04/2022    AST 19 08/04/2022    ALKPHOS 98 08/04/2022    BILITOT 0.5 08/04/2022       Assessment:   Problem List Items Addressed This Visit          Neuro    Lumbar spondylosis     Other Visit Diagnoses       Lumbar radiculopathy    -  Primary    Relevant Medications    gabapentin (NEURONTIN) 300 MG capsule    Dorsalgia, unspecified        Relevant Orders    MRI Lumbar Spine Without Contrast              67 y.o. year old male with PMH HTN, PAD presents to the office with neck and back pain.  He reports prior anterior cervical fusion in 2014 and prior lumbar laminectomy in 2016.  Today he says he doesn't feel like he got much relief with either surgery.  Today his pain is 9/10 mostly in his axial lower back, intermittent, aching.  He has burning in between his shoulder blades and still feels burning into his hands.  He denies any weakness, numbness, or bowel/bladder dysfunction    9/20/2022: Bossman Duke returns to the clinic for follow up. He is s/p b/l L3/4 and L4/5 RFA on 08/23/2022 with 0% relief.  Today he rates his pain as a 8/10.  He is also reporting associated pain radiating down bilateral legs into feet, left greater than right, with some burning sensation in bilateral legs.  He states this pain is worsened with physical activity and slightly alleviated with  rest.  He denies any numbness, weakness or changes with his bowel or bladder function.  He continues to take daily Tylenol without significant relief of his pain    - on exam he has full strength.  - He is s/p b/l L3/4 and L4/5 RFA on 08/23/2022 with 0% relief.  - based on no significant relief from the lumbar RFA where previously he had good relief I would like to order a lumbar MRI to further evaluate his neuro anatomy.  - his pain is limiting his mobility interfering with his ADLs.  - I believe he may be having some radicular pain   - would like to start him on a trial of gabapentin 300 mg nightly for 1st week then 300 mg b.i.d. following that for the neuropathic component of his pain.  - we will call him with results of updated lumbar MRI and further treatment plan.  Chart shows potential previous L3/4 and L4/5 laminectomy/decompression.      : Not applicable    The above note was completed, in part, with the aid of Dragon dictation software/hardware. Translation errors may be present.    The total time spent for evaluation and management on 09/20/2022 including reviewing separately obtained history, performing a medically appropriate exam and evaluation, documenting clinical information in the health record, independently interpreting results and communicating them to the patient/family/caregiver, and ordering medications/tests/procedures was between 30-39 minutes.

## 2022-09-20 NOTE — PROGRESS NOTES
[facility] Follow Up Evaluation    Referred by: Dr. Nelson GAMBLE  Reason for referral: neck pain    CC:   Chief Complaint   Patient presents with    Follow-up      Last 3 PDI Scores 3/26/2021 2/3/2021   Pain Disability Index (PDI) 0 33     Interval HPI 9/20/2022: Bossman Duke returns to the clinic for follow up. He is s/p b/l L3/4 and L4/5 RFA on 08/23/2022 with 0% relief.  Today he rates his pain as a 8/10.  He is also reporting associated pain radiating down bilateral legs into feet, left greater than right, with some burning sensation in bilateral legs.  He states this pain is worsened with physical activity and slightly alleviated with rest.  He denies any numbness, weakness or changes with his bowel or bladder function.  He continues to take daily Tylenol without significant relief of his pain.      Pain Intervention History:  - s/p b/l L3/4 and L4/5 RFA on 3/2021 with 50% relief of his pain.  - s/p b/l L3/4 and L4/5 RFA on 08/23/2022 with 0% relief.    HPI:   Bossman Duke is a 67 y.o. male who complains of neck pain    Onset: at least a few years  Progression: since onset, pain is gradually worsening  Current Pain Score: 8/10  Typical Range: 5-9/10  Timing: intermittent  Quality: dull, aching  Radiation: no  Associated numbness or weakness: no numbness, no weakness   Exacerbated by: standing, back extension  Allievated by: rest  Is Pain Level Acceptable?: No    Previous Therapies:  PT/OT:   HEP:   Interventions:   Surgery:  - h/o cervical fusion 7 years ago  - h/o lumbar likely laminectomy 5 ago  Medications:   - NSAIDS:   - MSK Relaxants:   - TCAs:   - SNRIs:   - Topicals:   - Anticonvulsants:  - Opioids:     History:    Current Outpatient Medications:     aspirin (ECOTRIN) 81 MG EC tablet, Take 81 mg by mouth once daily., Disp: , Rfl:     clopidogreL (PLAVIX) 75 mg tablet, TAKE ONE TABLET BY MOUTH DAILY, Disp: 90 tablet, Rfl: 3    cyanocobalamin (VITAMIN B-12) 100 MCG tablet, Take 100 mcg by  "mouth once daily., Disp: , Rfl:     famotidine (PEPCID) 20 MG tablet, Take 20 mg by mouth as needed., Disp: , Rfl:     fluticasone-umeclidin-vilanter (TRELEGY ELLIPTA) 100-62.5-25 mcg DsDv, Inhale 1 puff into the lungs once daily., Disp: 30 each, Rfl: 11    furosemide (LASIX) 40 MG tablet, Take 1 tablet (40 mg total) by mouth daily as needed (as needed for leg swelling). (Patient taking differently: Take 40 mg by mouth once daily. Except on Tuesday), Disp: 90 tablet, Rfl: 3    metFORMIN (GLUCOPHAGE-XR) 500 MG ER 24hr tablet, Take 1 tablet (500 mg total) by mouth daily with breakfast., Disp: 90 tablet, Rfl: 3    metOLazone (ZAROXOLYN) 2.5 MG tablet, Take 1 tablet (2.5 mg total) by mouth once a week., Disp: 4 tablet, Rfl: 6    multivit-mins no.63/iron/folic (M-VIT ORAL), Take 1 tablet by mouth once daily., Disp: , Rfl:     NIFEdipine (PROCARDIA-XL) 60 MG (OSM) 24 hr tablet, Take 1 tablet (60 mg total) by mouth every evening., Disp: 90 tablet, Rfl: 1    tiZANidine (ZANAFLEX) 4 MG tablet, Take 4 mg by mouth every evening., Disp: , Rfl:     valsartan (DIOVAN) 320 MG tablet, Take 1 tablet (320 mg total) by mouth once daily., Disp: 90 tablet, Rfl: 1    Past Medical History:   Diagnosis Date    Anticoagulant long-term use     Arthritis     COPD (chronic obstructive pulmonary disease)     Hypertension     PAD (peripheral artery disease)     PUD (peptic ulcer disease)        Past Surgical History:   Procedure Laterality Date    APPENDECTOMY      CERVICAL SPINE SURGERY      EYE SURGERY Left     "as  a child"    INJECTION OF ANESTHETIC AGENT AROUND MEDIAL BRANCH NERVES INNERVATING LUMBAR FACET JOINT Bilateral 2/9/2021    Procedure: Block-nerve-medial branch-lumbar L3/4, L4/5, L5/S1;  Surgeon: Linwood Aponte MD;  Location: [facility];  Service: Pain Management;  Laterality: Bilateral;    LUMBAR DISC SURGERY      PERCUTANEOUS TRANSLUMINAL ANGIOPLASTY N/A 2/15/2021    Procedure: Pta (Angioplasty, Percutaneous, Transluminal);  " Surgeon: Jl Neil MD;  Location: [facility];  Service: Cardiology;  Laterality: N/A;    RADIOFREQUENCY ABLATION Right 3/1/2021    Procedure: Radiofrequency Ablation L3/4, L4/5;  Surgeon: Linwood Aponte MD;  Location: [facility] OR;  Service: Pain Management;  Laterality: Right;    RADIOFREQUENCY ABLATION Left 3/10/2021    Procedure: Radiofrequency Ablation L3/4, L4/5;  Surgeon: Linwood Aponte MD;  Location: [facility] OR;  Service: Pain Management;  Laterality: Left;    RADIOFREQUENCY ABLATION OF LUMBAR MEDIAL BRANCH NERVE AT SINGLE LEVEL Bilateral 8/23/2022    Procedure: Radiofrequency Ablation, Nerve, Spinal, Lumbar, Medial Branch, L3/4, L4/5;  Surgeon: Linwood Aponte MD;  Location: [facility] OR;  Service: Pain Management;  Laterality: Bilateral;       Family History   Problem Relation Age of Onset    Diabetes Mother     Heart disease Mother     Heart disease Father     Glaucoma Neg Hx     Macular degeneration Neg Hx     Retinal detachment Neg Hx        Social History     Socioeconomic History    Marital status:    Tobacco Use    Smoking status: Former     Packs/day: 0.25     Types: Cigarettes    Smokeless tobacco: Never    Tobacco comments:     Quit about 2 months ago in Feb 2021   Substance and Sexual Activity    Alcohol use: Yes     Alcohol/week: 2.0 standard drinks     Types: 2 Cans of beer per week     Comment: every other week    Drug use: Never       Review of patient's allergies indicates:   Allergen Reactions    Benicar [olmesartan] Other (See Comments)     Gave him severe fatigue and muscle cramps.     Lisinopril Other (See Comments)     Fatigue and insomnia    Metoprolol Shortness Of Breath       Review of Systems:  General ROS: negative for - fever  Psychological ROS: negative for - hostility  Hematological and Lymphatic ROS: negative for - bleeding problems  Endocrine ROS: negative for - unexpected weight changes  Respiratory ROS: no cough, shortness of breath, or wheezing  Cardiovascular ROS:  "no chest pain or dyspnea on exertion  Gastrointestinal ROS: no abdominal pain, change in bowel habits, or black or bloody stools  Musculoskeletal ROS: negative for - muscular weakness  Neurological ROS: negative for - bowel and bladder control changes or numbness/tingling  Dermatological ROS: negative for rash    Physical Exam:  Vitals:    09/20/22 0956   BP: (!) 144/70   Pulse: 80   SpO2: 96%   Weight: 131.7 kg (290 lb 5.5 oz)   Height: 5' 10" (1.778 m)   PainSc:   4   PainLoc: Back     Body mass index is 41.66 kg/m².     Gen: NAD  Psych: mood appropriate for given condition  CV: 2+ radial pulse  HEENT: anicteric   Respiratory: non labored  Abd: soft nt, nd  Gait: gait intact  ROM: limited AROM of the L spine in all planes, full ROM at ankles, knees and hips  Lumbar flexion 90 degrees, extension 50 degrees, side bending 30 degrees.    Sensation: intact to light touch in all dermatomes tested from L2-S1 bilaterally  Reflexes: 2+ right patella 3+ left patella, 2+ right achilles and 1+ left achilles  Palpation: Diffusely tender over lumbar paraspinals  -TTP over the b/l greater trochanters and bilateral SI joint  Tone: normal in the b/l knees and hips   Skin: intact  Extremities: No edema in b/l ankles or hands  Provacative tests: + bilateral axial facet loading       Right Left   L2/3 Iliacus Hip flexion  5  5   L3/4 Qudratus Femoris Knee Extension  5  5   L4/5 Tib Anterior Ankle Dorsiflexion   5  5   L5/S1 Extensor Hallicus Longus Great toe extension  5  5                 S1/S2 Gastroc/Soleus Plantar Flexion  5  5     Imaging:  U/S BRISEIDA's 1/26/21  Impression:     No evidence of acute deep venous thrombosis in the left or right lower extremity veins.    Xray cervical spine 7/9/20  FINDINGS:  The neck is short.  Even on swimmer's view C7-T1 is not well seen.  The patient has had prior anterior cervical fusion performed at C3-4 and C4-5 with anterior plate and screws and interbody device is noted.  There appears to be " bone union.  There is disc space narrowing identified at C5-6 and probably C6-7 on one view.  Neural foraminal stenosis is demonstrated at every level from C3 down.    Xray lumbar spine 2/3/21  FINDINGS:  Osseous demineralization is noted.  No acute fracture or dislocation is noted.  Facet arthropathy is noted at L3-L4, L4-L5, and L5-S1 levels.  Intervertebral disc height loss noted at the L3-L4 and L5-S1 levels.  Atherosclerotic aortic changes are noted.  No change in alignment is noted upon flexion and extension.  Vertebral body heights appear well preserved.    MRI cervical spine 2/4/21  FINDINGS:  CORD: Slightly decreased cord caliber through mid lower cervical spine.  Myelomalacia at the superior C3 and C3-4 levels.    ALIGNMENT: Trace anterolisthesis C7-T1 lateral masses of C1 and C2 are congruent.    BONES: C3-5 ACDF.  Vertebral body heights are maintained.  Mixed type 1 and 2 endplate changes at C6-7.  No aggressive bone marrow signal.    PARASPINAL AREA: Normal.    CERVICAL DISC LEVELS: Prominent diffuse congenital spinal canal narrowing.    C2-C3: Mild disc osteophyte complex.  Moderate left facet hypertrophy.  Preserved ventral and dorsal CSF.  Mild left foraminal stenosis.  C3-C4: Fused.  Mild-moderate disc osteophyte complex.  Mild-moderate left facet hypertrophy.  Ligamentum flavum thickening.  Mild-moderate cord flattening with thin sliver of preserved ventral and dorsal CSF.  Severe bilateral foraminal stenosis.  C4-C5: Fused.  Mild-moderate disc osteophyte complex.  Mild ventral cord flattening, greatest on the right.  Thin sliver preserved ventral and dorsal CSF.  Moderate-severe bilateral foraminal stenosis.  C5-C6: Moderate disc osteophyte complex.  Mild ventral cord flattening with thin sliver preserved ventral and preserved dorsal CSF.  Severe bilateral foraminal stenosis.  C6-C7: Moderate disc osteophyte complex.  Mild left and minimal right facet hypertrophy.  Ligamentum flavum thickening.   Mild ventral cord flattening, greater on the right.  Effacement of ventral and thin sliver preserved dorsal CSF.  Severe bilateral foraminal stenosis.  C7-T1: Mild disc osteophyte complex. Moderate bilateral facet hypertrophy.  Preserved CSF surrounding the cord.  Moderate bilateral foraminal stenosis.    Labs:  BMP  Lab Results   Component Value Date     08/04/2022    K 5.3 (H) 08/04/2022     08/04/2022    CO2 25 08/04/2022    BUN 19 08/04/2022    CREATININE 0.9 08/04/2022    CALCIUM 10.2 08/04/2022    ANIONGAP 10 08/04/2022    ESTGFRAFRICA >60.0 03/03/2022    EGFRNONAA >60.0 03/03/2022     Lab Results   Component Value Date    ALT 33 08/04/2022    AST 19 08/04/2022    ALKPHOS 98 08/04/2022    BILITOT 0.5 08/04/2022       Assessment:   Problem List Items Addressed This Visit          Neuro    Lumbar spondylosis     Other Visit Diagnoses       Lumbar radiculopathy    -  Primary    Relevant Medications    gabapentin (NEURONTIN) 300 MG capsule    Dorsalgia, unspecified        Relevant Orders    MRI Lumbar Spine Without Contrast              67 y.o. year old male with PMH HTN, PAD presents to the office with neck and back pain.  He reports prior anterior cervical fusion in 2014 and prior lumbar laminectomy in 2016.  Today he says he doesn't feel like he got much relief with either surgery.  Today his pain is 9/10 mostly in his axial lower back, intermittent, aching.  He has burning in between his shoulder blades and still feels burning into his hands.  He denies any weakness, numbness, or bowel/bladder dysfunction    9/20/2022: Bossman Duke returns to the clinic for follow up. He is s/p b/l L3/4 and L4/5 RFA on 08/23/2022 with 0% relief.  Today he rates his pain as a 8/10.  He is also reporting associated pain radiating down bilateral legs into feet, left greater than right, with some burning sensation in bilateral legs.  He states this pain is worsened with physical activity and slightly alleviated with  rest.  He denies any numbness, weakness or changes with his bowel or bladder function.  He continues to take daily Tylenol without significant relief of his pain    - on exam he has full strength.  - He is s/p b/l L3/4 and L4/5 RFA on 08/23/2022 with 0% relief.  - based on no significant relief from the lumbar RFA where previously he had good relief I would like to order a lumbar MRI to further evaluate his neuro anatomy.  - his pain is limiting his mobility interfering with his ADLs.  - I believe he may be having some radicular pain   - would like to start him on a trial of gabapentin 300 mg nightly for 1st week then 300 mg b.i.d. following that for the neuropathic component of his pain.  - we will call him with results of updated lumbar MRI and further treatment plan.  Chart shows potential previous L3/4 and L4/5 laminectomy/decompression.      : Not applicable    The above note was completed, in part, with the aid of Dragon dictation software/hardware. Translation errors may be present.    The total time spent for evaluation and management on 09/20/2022 including reviewing separately obtained history, performing a medically appropriate exam and evaluation, documenting clinical information in the health record, independently interpreting results and communicating them to the patient/family/caregiver, and ordering medications/tests/procedures was between 30-39 minutes.

## 2022-09-24 ENCOUNTER — HOSPITAL ENCOUNTER (OUTPATIENT)
Dept: RADIOLOGY | Facility: HOSPITAL | Age: 67
Discharge: HOME | End: 2022-09-24
Payer: MEDICARE

## 2022-09-24 DIAGNOSIS — M54.9 DORSALGIA, UNSPECIFIED: ICD-10-CM

## 2022-09-24 PROCEDURE — 72148 MRI LUMBAR SPINE WITHOUT CONTRAST: ICD-10-PCS | Mod: 26,,, | Performed by: RADIOLOGY

## 2022-09-24 PROCEDURE — 72148 MRI LUMBAR SPINE W/O DYE: CPT | Mod: 26,,, | Performed by: RADIOLOGY

## 2022-09-24 PROCEDURE — 72148 MRI LUMBAR SPINE W/O DYE: CPT | Mod: TC,PO

## 2022-09-26 ENCOUNTER — TELEPHONE (OUTPATIENT)
Dept: PAIN MEDICINE | Facility: CLINIC | Age: 67
End: 2022-09-26
Payer: MEDICARE

## 2022-09-26 DIAGNOSIS — M54.16 LUMBAR RADICULOPATHY: Primary | ICD-10-CM

## 2022-09-26 RX ORDER — ALPRAZOLAM 0.5 MG/1
1 TABLET, ORALLY DISINTEGRATING ORAL ONCE AS NEEDED
Status: CANCELLED | OUTPATIENT
Start: 2022-10-05 | End: 2034-03-02

## 2022-09-26 NOTE — TELEPHONE ENCOUNTER
Call placed to Pt to advise Per MIGUEL ÁNGEL Millan, he has  reviewed his lumbar MRI.     He has multiple levels with disc extrusions.  This is more than likely contributing to his pain.     Based on previous lumbar surgeries at L 3/4 and L4/5 will schedule him for the following procedure.     Pt scheduled for a Lumbar Epidural  L5/S1, with Local sedation. Pt will need to hold ASA and Plavix x 7 days prior to his procedure.  Clearance request forwarded to Dr. KENDRA Neil.    Procedure scheduled for 10-5-22. F/U scheduled for 10-19-22.     Pt c/o pain to left hip and would like to know what can be done for his pain prior to his procedure. Message forwarded to Dr. Aponte.

## 2022-09-26 NOTE — TELEPHONE ENCOUNTER
Pt scheduled for a Lumbar Epidural  L5/S1, with Local sedation. Pt will need to hold ASA and Plavix x 7 days prior to his procedure.  Clearance request forwarded to Dr. KENDRA Neil.    Procedure scheduled for 10-5-22. F/U scheduled for 10-19-22

## 2022-09-26 NOTE — TELEPHONE ENCOUNTER
Please let patient know I reviewed his lumbar MRI.    He has multiple levels with disc extrusions.  This is more than likely contributing to his pain.    Based on previous lumbar surgeries at L 3/4 and L4/5 will schedule him for the following procedure.    Type of Procedure/Injection - Lumbar Epidural  L5/S1           Laterality - NA      Type of Sedation - Local      Need to hold medication - yes      Aspirin for 7 days and Plavix for 7 days      Clearance needed - yes      Follow up - 2 week

## 2022-09-28 NOTE — TELEPHONE ENCOUNTER
Call placed to Pt to advise per Dr. Neil, there is no contraindication for surgery/anesthesia from cardiac standpoint. It is Ok to hold plavix/asa 7 days. Pt verbalized understanding.

## 2022-10-03 ENCOUNTER — PATIENT MESSAGE (OUTPATIENT)
Dept: CARDIOLOGY | Facility: CLINIC | Age: 67
End: 2022-10-03
Payer: MEDICARE

## 2022-10-05 ENCOUNTER — HOSPITAL ENCOUNTER (OUTPATIENT)
Facility: HOSPITAL | Age: 67
Discharge: HOME | End: 2022-10-05
Attending: ANESTHESIOLOGY | Admitting: ANESTHESIOLOGY
Payer: MEDICARE

## 2022-10-05 ENCOUNTER — HOSPITAL ENCOUNTER (OUTPATIENT)
Dept: RADIOLOGY | Facility: HOSPITAL | Age: 67
Discharge: HOME | End: 2022-10-05
Attending: ANESTHESIOLOGY | Admitting: ANESTHESIOLOGY
Payer: MEDICARE

## 2022-10-05 VITALS
WEIGHT: 287 LBS | OXYGEN SATURATION: 98 % | HEART RATE: 90 BPM | HEIGHT: 70 IN | SYSTOLIC BLOOD PRESSURE: 145 MMHG | DIASTOLIC BLOOD PRESSURE: 74 MMHG | TEMPERATURE: 98 F | BODY MASS INDEX: 41.09 KG/M2 | RESPIRATION RATE: 18 BRPM

## 2022-10-05 DIAGNOSIS — M54.50 LOWER BACK PAIN: ICD-10-CM

## 2022-10-05 DIAGNOSIS — M54.16 LUMBAR RADICULOPATHY: Primary | ICD-10-CM

## 2022-10-05 LAB
CTP QC/QA: YES
SARS-COV-2 AG RESP QL IA.RAPID: NEGATIVE

## 2022-10-05 PROCEDURE — 62323 NJX INTERLAMINAR LMBR/SAC: CPT | Mod: ,,, | Performed by: ANESTHESIOLOGY

## 2022-10-05 PROCEDURE — 25500020 PHARM REV CODE 255: Mod: PO | Performed by: ANESTHESIOLOGY

## 2022-10-05 PROCEDURE — 63600175 PHARM REV CODE 636 W HCPCS: Mod: PO | Performed by: ANESTHESIOLOGY

## 2022-10-05 PROCEDURE — 62323 NJX INTERLAMINAR LMBR/SAC: CPT | Mod: PO | Performed by: ANESTHESIOLOGY

## 2022-10-05 PROCEDURE — 25000003 PHARM REV CODE 250: Mod: PO | Performed by: ANESTHESIOLOGY

## 2022-10-05 PROCEDURE — 62323 PR INJ LUMBAR/SACRAL, W/IMAGING GUIDANCE: ICD-10-PCS | Mod: ,,, | Performed by: ANESTHESIOLOGY

## 2022-10-05 PROCEDURE — 76000 FLUOROSCOPY <1 HR PHYS/QHP: CPT | Mod: TC,PO

## 2022-10-05 RX ORDER — METHYLPREDNISOLONE ACETATE 80 MG/ML
INJECTION, SUSPENSION INTRA-ARTICULAR; INTRALESIONAL; INTRAMUSCULAR; SOFT TISSUE
Status: DISCONTINUED | OUTPATIENT
Start: 2022-10-05 | End: 2022-10-05 | Stop reason: HOSPADM

## 2022-10-05 RX ORDER — LIDOCAINE HYDROCHLORIDE 10 MG/ML
INJECTION, SOLUTION EPIDURAL; INFILTRATION; INTRACAUDAL; PERINEURAL
Status: DISCONTINUED | OUTPATIENT
Start: 2022-10-05 | End: 2022-10-05 | Stop reason: HOSPADM

## 2022-10-05 RX ORDER — ALPRAZOLAM 0.5 MG/1
1 TABLET, ORALLY DISINTEGRATING ORAL ONCE AS NEEDED
Status: COMPLETED | OUTPATIENT
Start: 2022-10-05 | End: 2022-10-05

## 2022-10-05 RX ADMIN — ALPRAZOLAM 1 MG: 0.5 TABLET, ORALLY DISINTEGRATING ORAL at 01:10

## 2022-10-05 NOTE — PLAN OF CARE
VSS, all questions answered. Denies recent fever or illness. Pt states ready for procedure. Alprazolam x1 administered prior to procedure, but after consents were signed.

## 2022-10-05 NOTE — DISCHARGE SUMMARY
[facility]  Discharge Note  Short Stay    Admit Date: 10/5/2022    Discharge Date: 10/5/2022    Attending Physician: Linwood Aponte     Discharge Provider: Linwood Aponte    Diagnoses:  There are no hospital problems to display for this patient.      Discharged Condition: Good    Final Diagnoses: Lumbar radiculopathy [M54.16]    Disposition: Home or Self Care    Hospital Course: No complications, uneventful    Outcome of Hospitalization, Treatment, Procedure, or Surgery:  Patient was admitted for outpatient interventional pain management procedure. The patient tolerated the procedure well with no complications.    Follow up/Patient Instructions:  Follow up as scheduled in Pain Management office in 2-3 weeks.  Patient has received instructions and follow up date and time.    Medications:  Continue previous medications, except restart aspirin and plavix in 24 hours    Discharge Procedure Orders   Notify your health care provider if you experience any of the following:  temperature >100.4     Notify your health care provider if you experience any of the following:  persistent nausea and vomiting or diarrhea     Notify your health care provider if you experience any of the following:  severe uncontrolled pain     Notify your health care provider if you experience any of the following:  redness, tenderness, or signs of infection (pain, swelling, redness, odor or green/yellow discharge around incision site)     Notify your health care provider if you experience any of the following:  difficulty breathing or increased cough     Notify your health care provider if you experience any of the following:  severe persistent headache     Notify your health care provider if you experience any of the following:  worsening rash     Notify your health care provider if you experience any of the following:  persistent dizziness, light-headedness, or visual disturbances     Notify your health care provider if you experience any  of the following:  increased confusion or weakness     Activity as tolerated

## 2022-10-05 NOTE — OP NOTE
"Procedure Note    Procedure Date: 10/5/2022    Procedure Performed:  L3/4 lumbar interlaminar epidural steroid injection under fluoroscopy.    Indications: Patient has failed conservative therapy.      Pre-op diagnosis: Lumbar Radiculopathy    Post-op diagnosis: same    Physician: Linwood Aponte MD    IV anxiolysis medications: none    Medications injected: depomedrol 80mg, 1% Lidocaine 1ml, 2 mL sterile, preservative-free normal saline.    Local anesthetic used: 1% Lidocaine, 1 ml, 8.4% sodium bicarbonate 0.25ml    Estimated Blood Loss: none    Complications:  none    Technique:  The patient was interviewed in the holding area and Risks/Benefits were discussed, including, but not limited to, the possibility of new or different pain, bleeding or infection.   All questions were answered.  The patient understood and accepted risks.  Consent was verfied.  A time-out was taken to identify patient and procedure prior to starting the procedure. The patient was placed in the prone position on the fluoroscopy table. The area of the lumbar spine was prepped with Chloraprep and draped in a sterile manner. The L3/4 interspace was identified and marked under AP fluoroscopy. The skin and subcutaneous tissues overlying the targeted interspace were anesthetized with 3-5 mL of 1% lidocaine using a 25G, 1.5" needle.  A 18G, 16" Tuohy epidural needle was directed toward the interspace under fluoroscopic guidance until the ligamentum flavum was engaged. From this point, a loss of resistance technique with a glass syringe and saline was used to identify entrance of the needle into the epidural space. Once loss of resistance was observed 1 mL of contrast solution was injected. An appropriate epidurogram was noted.  A 4 mL mixture consisting of saline, 1 mL 1% Lidocaine and 80 mg of depomedrol was injected slowly and without resistance.  The needle was flushed with normal saline and removed. The contrast was seen to be displaced after " injection. Patient was awake/responsive during all injections.  The patient tolerated the procedure well and was transferred to the [facility] in stable condition.  The patient was monitored after the procedure and was given post-procedure and discharge instructions to follow at home. The patient was discharged in a stable condition.

## 2022-10-05 NOTE — INTERVAL H&P NOTE
The patient has been examined and the H&P has been reviewed:    I concur with the findings and changes have been noted since the H&P was written: pain radiating into both hips and down left leg.  Denies new numbness, weakness, or bowel/bladder dysfunction.  MRI reviewed and c/w L2-3 demonstrates a moderate central disc extrusion with moderate spinal canal narrowing and L3-4 demonstrates a moderate central disc extrusion with moderate spinal canal narrowing.  We will proceed with L3/4 vs L4/5 CHRISTINA. The risks and benefits of this intervention, and alternative therapies were discussed with the patient.  The discussion of risks included infection, bleeding, need for additional procedures or surgery, nerve damage.  Questions regarding the procedure, risks, expected outcome, and possible side effects were solicited and answered to the patient's satisfaction.  Bossman Duke wishes to proceed with the injection or procedure.  Written consent was obtained.    There are no hospital problems to display for this patient.

## 2022-10-13 ENCOUNTER — TELEPHONE (OUTPATIENT)
Dept: PAIN MEDICINE | Facility: CLINIC | Age: 67
End: 2022-10-13
Payer: MEDICARE

## 2022-10-13 DIAGNOSIS — M54.16 LUMBAR RADICULOPATHY: Primary | ICD-10-CM

## 2022-10-13 NOTE — TELEPHONE ENCOUNTER
Call returned to Pt who states he had a fall on Tuesday and went to the ER on Wednesday. C/o pain in mid back to left side, left hip pain, and states elbow is chipped..States he fell forward but did not hit his head. States he was instructed to call if he felt previous injection was not working. He would like the referral he and  mentioned. Message forwarded for review.

## 2022-10-13 NOTE — TELEPHONE ENCOUNTER
----- Message from Vashti Carpio sent at 10/13/2022 10:51 AM CDT -----  Contact: Patient  Type:  Needs Medical Advice    Who Called: patient    Symptoms (please be specific): back pain     How long has patient had these symptoms:  since injections       Would the patient rather a call back or a response via EagerPandaner? Call    Best Call Back Number: [phone] (home)     Additional Information: patient states that the dr told him to call if the injections have not worked for the pain. Patient is calling to state that the shots did not work. Please call patient to advise .

## 2022-10-14 NOTE — TELEPHONE ENCOUNTER
Call placed to Pt to advise Dr. Aponte has placed a Neurosurgery referral for him. Pt is scheduled for 10-19-22 with Dr. Turner.

## 2022-10-19 ENCOUNTER — OFFICE VISIT (OUTPATIENT)
Dept: NEUROSURGERY | Facility: CLINIC | Age: 67
End: 2022-10-19
Payer: MEDICARE

## 2022-10-19 VITALS
HEIGHT: 70 IN | DIASTOLIC BLOOD PRESSURE: 85 MMHG | SYSTOLIC BLOOD PRESSURE: 145 MMHG | WEIGHT: 287.06 LBS | RESPIRATION RATE: 18 BRPM | HEART RATE: 60 BPM | BODY MASS INDEX: 41.1 KG/M2

## 2022-10-19 DIAGNOSIS — M54.12 CERVICAL RADICULOPATHY: Primary | ICD-10-CM

## 2022-10-19 DIAGNOSIS — M54.16 LUMBAR RADICULOPATHY: ICD-10-CM

## 2022-10-19 PROCEDURE — 3288F PR FALLS RISK ASSESSMENT DOCUMENTED: ICD-10-PCS | Mod: CPTII,S$GLB,, | Performed by: STUDENT IN AN ORGANIZED HEALTH CARE EDUCATION/TRAINING PROGRAM

## 2022-10-19 PROCEDURE — 3079F DIAST BP 80-89 MM HG: CPT | Mod: CPTII,S$GLB,, | Performed by: STUDENT IN AN ORGANIZED HEALTH CARE EDUCATION/TRAINING PROGRAM

## 2022-10-19 PROCEDURE — 99215 PR OFFICE/OUTPT VISIT, EST, LEVL V, 40-54 MIN: ICD-10-PCS | Mod: S$GLB,,, | Performed by: STUDENT IN AN ORGANIZED HEALTH CARE EDUCATION/TRAINING PROGRAM

## 2022-10-19 PROCEDURE — 99499 UNLISTED E&M SERVICE: CPT | Mod: S$GLB,,, | Performed by: STUDENT IN AN ORGANIZED HEALTH CARE EDUCATION/TRAINING PROGRAM

## 2022-10-19 PROCEDURE — 1100F PR PT FALLS ASSESS DOC 2+ FALLS/FALL W/INJURY/YR: ICD-10-PCS | Mod: CPTII,S$GLB,, | Performed by: STUDENT IN AN ORGANIZED HEALTH CARE EDUCATION/TRAINING PROGRAM

## 2022-10-19 PROCEDURE — 3288F FALL RISK ASSESSMENT DOCD: CPT | Mod: CPTII,S$GLB,, | Performed by: STUDENT IN AN ORGANIZED HEALTH CARE EDUCATION/TRAINING PROGRAM

## 2022-10-19 PROCEDURE — 1100F PTFALLS ASSESS-DOCD GE2>/YR: CPT | Mod: CPTII,S$GLB,, | Performed by: STUDENT IN AN ORGANIZED HEALTH CARE EDUCATION/TRAINING PROGRAM

## 2022-10-19 PROCEDURE — 3077F PR MOST RECENT SYSTOLIC BLOOD PRESSURE >= 140 MM HG: ICD-10-PCS | Mod: CPTII,S$GLB,, | Performed by: STUDENT IN AN ORGANIZED HEALTH CARE EDUCATION/TRAINING PROGRAM

## 2022-10-19 PROCEDURE — 3077F SYST BP >= 140 MM HG: CPT | Mod: CPTII,S$GLB,, | Performed by: STUDENT IN AN ORGANIZED HEALTH CARE EDUCATION/TRAINING PROGRAM

## 2022-10-19 PROCEDURE — 1125F AMNT PAIN NOTED PAIN PRSNT: CPT | Mod: CPTII,S$GLB,, | Performed by: STUDENT IN AN ORGANIZED HEALTH CARE EDUCATION/TRAINING PROGRAM

## 2022-10-19 PROCEDURE — 1125F PR PAIN SEVERITY QUANTIFIED, PAIN PRESENT: ICD-10-PCS | Mod: CPTII,S$GLB,, | Performed by: STUDENT IN AN ORGANIZED HEALTH CARE EDUCATION/TRAINING PROGRAM

## 2022-10-19 PROCEDURE — 4010F PR ACE/ARB THEARPY RXD/TAKEN: ICD-10-PCS | Mod: CPTII,S$GLB,, | Performed by: STUDENT IN AN ORGANIZED HEALTH CARE EDUCATION/TRAINING PROGRAM

## 2022-10-19 PROCEDURE — 4010F ACE/ARB THERAPY RXD/TAKEN: CPT | Mod: CPTII,S$GLB,, | Performed by: STUDENT IN AN ORGANIZED HEALTH CARE EDUCATION/TRAINING PROGRAM

## 2022-10-19 PROCEDURE — 99499 RISK ADDL DX/OHS AUDIT: ICD-10-PCS | Mod: S$GLB,,, | Performed by: STUDENT IN AN ORGANIZED HEALTH CARE EDUCATION/TRAINING PROGRAM

## 2022-10-19 PROCEDURE — 3079F PR MOST RECENT DIASTOLIC BLOOD PRESSURE 80-89 MM HG: ICD-10-PCS | Mod: CPTII,S$GLB,, | Performed by: STUDENT IN AN ORGANIZED HEALTH CARE EDUCATION/TRAINING PROGRAM

## 2022-10-19 PROCEDURE — 3044F HG A1C LEVEL LT 7.0%: CPT | Mod: CPTII,S$GLB,, | Performed by: STUDENT IN AN ORGANIZED HEALTH CARE EDUCATION/TRAINING PROGRAM

## 2022-10-19 PROCEDURE — 99215 OFFICE O/P EST HI 40 MIN: CPT | Mod: S$GLB,,, | Performed by: STUDENT IN AN ORGANIZED HEALTH CARE EDUCATION/TRAINING PROGRAM

## 2022-10-19 PROCEDURE — 3044F PR MOST RECENT HEMOGLOBIN A1C LEVEL <7.0%: ICD-10-PCS | Mod: CPTII,S$GLB,, | Performed by: STUDENT IN AN ORGANIZED HEALTH CARE EDUCATION/TRAINING PROGRAM

## 2022-10-19 NOTE — PROGRESS NOTES
"  [facility]  Clinic Consult     Consult Requested By: Linwood Aponte MD  PCP: Bossman Damon III, PA-C    SUBJECTIVE:     Chief Complaint:   Chief Complaint   Patient presents with    Lumbar Spine Pain (L-Spine)     At coccyx region. Radiates mainly on the left side thru hips to foot where he experiences numbness, tingling and burning. On the right side he experiences  spasms in the toes.        History of Present Illness:  Bossman Duke is a 67 y.o. male with COPD, HTN, PAD maintained on Plavix who presents for evaluation of cervical myelopathy. Patient reports history of C3-5 ACDF with Dr. Ibanez in 2014. He reports underwent the surgery for treatment of upper extremity numbness. He states it did not improve following surgery and he feels as though his hand numbness has worsened since his surgery. He also underwent lumbar laminectomy in 2017 with Dr. Ibanez for treatment of leg pain with ambulation. He reports no improvement in leg symptoms following surgery. He notes stenosis in his legs and will require stents placed next week. He feels as though his hand symptoms have been stable over the last year.     Pertinent and recent history, provider evaluations, imaging and data reviewed in EPIC      Past Medical History:   Diagnosis Date    Anticoagulant long-term use     Arthritis     COPD (chronic obstructive pulmonary disease)     Hypertension     PAD (peripheral artery disease)     PUD (peptic ulcer disease)      Past Surgical History:   Procedure Laterality Date    APPENDECTOMY      CERVICAL SPINE SURGERY      EPIDURAL STEROID INJECTION INTO LUMBAR SPINE N/A 10/5/2022    Procedure: Injection-steroid-epidural-lumbar L3/4;  Surgeon: Linwood Aponte MD;  Location: [facility];  Service: Pain Management;  Laterality: N/A;    EYE SURGERY Left     "as  a child"    INJECTION OF ANESTHETIC AGENT AROUND MEDIAL BRANCH NERVES INNERVATING LUMBAR FACET JOINT Bilateral 2/9/2021    Procedure: Block-nerve-medial " branch-lumbar L3/4, L4/5, L5/S1;  Surgeon: Linwood Aponte MD;  Location: [facility] OR;  Service: Pain Management;  Laterality: Bilateral;    LUMBAR DISC SURGERY      PERCUTANEOUS TRANSLUMINAL ANGIOPLASTY N/A 2/15/2021    Procedure: Pta (Angioplasty, Percutaneous, Transluminal);  Surgeon: Jl Neil MD;  Location: [facility] CATH;  Service: Cardiology;  Laterality: N/A;    RADIOFREQUENCY ABLATION Right 3/1/2021    Procedure: Radiofrequency Ablation L3/4, L4/5;  Surgeon: Linwood Aponte MD;  Location: [facility] OR;  Service: Pain Management;  Laterality: Right;    RADIOFREQUENCY ABLATION Left 3/10/2021    Procedure: Radiofrequency Ablation L3/4, L4/5;  Surgeon: Linwood Aponte MD;  Location: [facility] OR;  Service: Pain Management;  Laterality: Left;    RADIOFREQUENCY ABLATION OF LUMBAR MEDIAL BRANCH NERVE AT SINGLE LEVEL Bilateral 8/23/2022    Procedure: Radiofrequency Ablation, Nerve, Spinal, Lumbar, Medial Branch, L3/4, L4/5;  Surgeon: Linwood Aponte MD;  Location: [facility] OR;  Service: Pain Management;  Laterality: Bilateral;     Family History   Problem Relation Age of Onset    Diabetes Mother     Heart disease Mother     Heart disease Father     Glaucoma Neg Hx     Macular degeneration Neg Hx     Retinal detachment Neg Hx      Social History     Tobacco Use    Smoking status: Former     Packs/day: 0.25     Types: Cigarettes    Smokeless tobacco: Never    Tobacco comments:     Quit about 2 months ago in Feb 2021   Substance Use Topics    Alcohol use: Yes     Alcohol/week: 2.0 standard drinks     Types: 2 Cans of beer per week     Comment: every other week    Drug use: Never      Review of patient's allergies indicates:   Allergen Reactions    Benicar [olmesartan] Other (See Comments)     Gave him severe fatigue and muscle cramps.     Lisinopril Other (See Comments)     Fatigue and insomnia    Metoprolol Shortness Of Breath       Current Outpatient Medications:     aspirin (ECOTRIN) 81 MG EC tablet, Take 81 mg by mouth once  daily., Disp: , Rfl:     fluticasone-umeclidin-vilanter (TRELEGY ELLIPTA) 100-62.5-25 mcg DsDv, Inhale 1 puff into the lungs once daily., Disp: 30 each, Rfl: 11    furosemide (LASIX) 40 MG tablet, Take 1 tablet (40 mg total) by mouth daily as needed (as needed for leg swelling). (Patient taking differently: Take 40 mg by mouth once daily. Except on Tuesday), Disp: 90 tablet, Rfl: 3    gabapentin (NEURONTIN) 300 MG capsule, Take 1 capsule (300 mg total) by mouth 2 (two) times daily., Disp: 60 capsule, Rfl: 01    multivit-mins no.63/iron/folic (M-VIT ORAL), Take 1 tablet by mouth once daily., Disp: , Rfl:     NIFEdipine (PROCARDIA-XL) 60 MG (OSM) 24 hr tablet, Take 1 tablet (60 mg total) by mouth every evening., Disp: 90 tablet, Rfl: 1    oxyCODONE-acetaminophen (PERCOCET) 5-325 mg per tablet, Take 1 tablet by mouth every 4 (four) hours as needed for Pain., Disp: 12 tablet, Rfl: 0    predniSONE (DELTASONE) 50 MG Tab, Take 1 tablet (50 mg total) by mouth once daily., Disp: 5 tablet, Rfl: 0    tiZANidine (ZANAFLEX) 4 MG tablet, Take 4 mg by mouth every evening., Disp: , Rfl:     valsartan (DIOVAN) 320 MG tablet, Take 1 tablet (320 mg total) by mouth once daily., Disp: 90 tablet, Rfl: 1    clopidogreL (PLAVIX) 75 mg tablet, TAKE ONE TABLET BY MOUTH DAILY (Patient not taking: Reported on 10/19/2022), Disp: 90 tablet, Rfl: 3    cyanocobalamin (VITAMIN B-12) 100 MCG tablet, Take 100 mcg by mouth once daily., Disp: , Rfl:     famotidine (PEPCID) 20 MG tablet, Take 20 mg by mouth as needed., Disp: , Rfl:     metFORMIN (GLUCOPHAGE-XR) 500 MG ER 24hr tablet, Take 1 tablet (500 mg total) by mouth daily with breakfast. (Patient not taking: Reported on 10/19/2022), Disp: 90 tablet, Rfl: 3    metOLazone (ZAROXOLYN) 2.5 MG tablet, Take 1 tablet (2.5 mg total) by mouth once a week. (Patient not taking: Reported on 10/19/2022), Disp: 4 tablet, Rfl: 6    Review of Systems:   Constitutional: no fever, chills or night sweats. No changes  "in weight   Eyes: no visual changes   ENT: no nasal congestion or sore throat   Respiratory: no cough or shortness of breath   Cardiovascular: no chest pain or palpitations   Gastrointestinal: no nausea or vomiting   Genitourinary: no hematuria or dysuria   Integument/Breast: no rash or pruritis   Hematologic/Lymphatic: no easy bruising or lymphadenopathy   Musculoskeletal: +back pain   Neurological: no seizures or tremors +numbness  Behavioral/Psych: no auditory or visual hallucinations   Endocrine: no heat or cold intolerance         OBJECTIVE:     Vital Signs (Most Recent):  Pulse: 60 (10/19/22 1026)  Resp: 18 (10/19/22 1026)  BP: (!) 145/85 (10/19/22 1026)  Estimated body mass index is 41.19 kg/m² as calculated from the following:    Height as of this encounter: 5' 10" (1.778 m).    Weight as of this encounter: 130.2 kg (287 lb 0.6 oz).    Physical Exam:   General: well developed, well nourished, no distress.   Neurologic: Alert and oriented. Thought content appropriate. GCS 15.   Language: No aphasia  Speech: No dysarthria  Head: normocephalic, atraumatic  Eyes: EOMI.  Neck: trachea midline, no JVD   Cardiovascular: no LE edema  Pulmonary: normal respirations, no signs of respiratory distress  Abdomen: non-distended  Sensory: diminished right thumb   Skin: Skin is warm, dry and intact.    Motor Strength: Moves all extremities spontaneously with good tone. No abnormal movements seen.     Strength  Deltoids Triceps Biceps Wrist Extension Wrist Flexion Hand  Interossei   Upper: R 5/5 5/5 5/5 5/5 5/5 5/5 4/5    L 5/5 5/5 5/5 5/5 5/5 5/5 4/5     Iliopsoas Quadriceps Knee  Flexion Tibialis  anterior Gastro- cnemius EHL    Lower: R 5/5 5/5 5/5 5/5 5/5 5/5     L 5/5 5/5 5/5 5/5 5/5 5/5      DTR's: 3+  Armstrong: present   Clonus: absent  Gait: normal    Tandem Gait: deferred            Cervical Spine: decreased ROM        Diagnostic Results:  I have independently reviewed the following imaging:  MRI: Reviewed  r  Is " complex imaging status post C3-5 anterior cervical diskectomy fusion.  He has residual stenosis at the segmental levels of decompression with spinal stenosis severe bilateral foraminal stenosis, there is myelomalacia with cord signal change at this segment.  He has central and bilateral foraminal stenosis at the adjacent segments at C5-6 and C6-7, with moderate facet arthropathy and bilateral foraminal stenosis at C7-T1 without significant neural compression at these levels      MRI Lumbar 9/24    Lumbar MRI.  Patient has a known decompression from L3 4 and L4-5  Appears to be central lateral recess L4-5 and at L3-4 a left-sided hemilaminectomy  At L2-3 there is a broad-based central right posterolateral disc herniation with moderate    Stenosis there is multilevel spondylosis L2-S1      ASSESSMENT/PLAN:     Lumbar radiculopathy  -     Ambulatory referral/consult to Neurosurgery      Bossman Duke is a 67 y.o. male    He is a complex history.  He had surgery by Dr. Ortega status post lumbar and cervical surgery.  He states he did not improvement from either  He has residual upper extremity paresthesias, numbness, difficulty with dexterity.  He feels like this has worsened over the previous year or 2  He does not feel like he is significantly worse over the last 6 months, however symptomatic with complex imaging.  He has myelomalacia.  He has residual stenosis at the previous ACDF levels as well as adjacent segments at C5-6 and C6-7 with cord compression severe bilateral foraminal stenosis.  With thorough discussion of his complex imaging, his stenosis treatment options.  Risks benefits pros and cons of surveillance, progression spinal cord injury.  He is symptomatic denies progression last 6 months to a year.  He is high risk factors he is on Plavix he is concerned about lower extremity vasculopathy and states he plans on proceeding with angiogram.  He understands that he does this require stenting he is unable to  proceed with cervical surgery.  I offered to collaborate discussed.  He said he is proceeding with the angiogram understands the risks and consequences  If so:  Her some surveillance and follow-up  We discussed a cervical decompression.  He require posterior cervical decompression and instrumented fusion.  With cardiac clearance for risk.  And withholding antiplatelets.  He will consider his options wants to consider was advised reschedule a visit at a minimum 3 6 months        .  Assessment and plan today  Very complex history and patient was seen by Dr. Webb cardiac history sees Dr. Webb.  States he is not currently taking Plavix though it is listed on his med list  Only aspirin  Very difficult history.  Of cervical stenosis with myelopathy.  He had surgeries by Dr. Killian cervical ACDF C3-5 with residual segmental stenosis, myelomalacia and multilevel central foraminal stenosis from C3-C7. .  He feels that overall his symptoms were stable he has chronic numbness in his arms.  He has difficulty with overhead reach without full abduction, otherwise stable.  Has some known imbalance and chronic myelopathy  However very complicated with residual stenosis and signs and symptoms      I his complaint now is lower back pain as well as leg pain.  He notes leg give out both legs but left greater than right.  His lumbar MRI shows multilevel lumbar spondylosis.  He has a history of an L3-5 decompression by Dr. Killian.  There is some mild lateral residual lateral recess and foraminal stenosis, L3-4 there is mild moderate stenosis there is what appears to be a hemilaminectomy defect on the left without severe neural compression.  At L2-3 there is a more significant central and right lead posterolateral disc herniation leading to moderate stenosis with compression.  Of the thecal sac I he states that he has pain difficulty standing.  He ambulates chronically with a cane but feels it is worse has some associated neurogenic  claudication  He has a modified his activity he states he is never benefited from physical therapy.  He had no improvement with an epidural steroid injection  Think that his options for complicated.  Has a large body habitus with a BMI greater than 40  There is concern from a lumbar standpoint of treating positionally with general intubation in a prone position with the degree of cervical stenosis that he is had previously he had an MRI in February  And attempt to safely consider his options from a treatment standpoint will obtain a new cervical MRI for comparison and evaluation of progression and a CT scan  To evaluate another consideration is a cervical decompression to treat his myelopathy and minimize the risk of progression which would require posterior cervical decompression and instrumented fusion  He has been thoroughly counseled on the risks benefits of each treatment strategy with regard to his lumbar spine the likely be a L3 for decompression and microdiskectomy with a right-sided approach do access.  Will obtain the imaging and re-evaluate.  I am continuing encouraged him to remain mobile with pain management consider therapy if needed and continue to maximize his fall precautions and nonsurgical management                        Patient verbalized understanding of plan. Encouraged to call with any questions or concerns.     This note was partially dictated using voice recognition software, so please excuse any errors that were not corrected.

## 2022-10-24 ENCOUNTER — OFFICE VISIT (OUTPATIENT)
Dept: PAIN MEDICINE | Facility: CLINIC | Age: 67
End: 2022-10-24
Payer: MEDICARE

## 2022-10-24 ENCOUNTER — OUTPATIENT CASE MANAGEMENT (OUTPATIENT)
Dept: ADMINISTRATIVE | Facility: OTHER | Age: 67
End: 2022-10-24
Payer: MEDICARE

## 2022-10-24 VITALS
OXYGEN SATURATION: 95 % | HEIGHT: 70 IN | DIASTOLIC BLOOD PRESSURE: 66 MMHG | BODY MASS INDEX: 41.78 KG/M2 | SYSTOLIC BLOOD PRESSURE: 128 MMHG | HEART RATE: 91 BPM

## 2022-10-24 DIAGNOSIS — M54.9 DORSALGIA, UNSPECIFIED: Primary | ICD-10-CM

## 2022-10-24 DIAGNOSIS — M54.16 LUMBAR RADICULOPATHY: ICD-10-CM

## 2022-10-24 DIAGNOSIS — G95.9 CERVICAL MYELOPATHY: ICD-10-CM

## 2022-10-24 PROCEDURE — 99999 PR PBB SHADOW E&M-EST. PATIENT-LVL III: CPT | Mod: PBBFAC,,,

## 2022-10-24 PROCEDURE — 99999 PR PBB SHADOW E&M-EST. PATIENT-LVL III: ICD-10-PCS | Mod: PBBFAC,,,

## 2022-10-24 PROCEDURE — 3078F DIAST BP <80 MM HG: CPT | Mod: CPTII,S$GLB,,

## 2022-10-24 PROCEDURE — 1101F PT FALLS ASSESS-DOCD LE1/YR: CPT | Mod: CPTII,S$GLB,,

## 2022-10-24 PROCEDURE — 4010F ACE/ARB THERAPY RXD/TAKEN: CPT | Mod: CPTII,S$GLB,,

## 2022-10-24 PROCEDURE — 3078F PR MOST RECENT DIASTOLIC BLOOD PRESSURE < 80 MM HG: ICD-10-PCS | Mod: CPTII,S$GLB,,

## 2022-10-24 PROCEDURE — 1101F PR PT FALLS ASSESS DOC 0-1 FALLS W/OUT INJ PAST YR: ICD-10-PCS | Mod: CPTII,S$GLB,,

## 2022-10-24 PROCEDURE — 4010F PR ACE/ARB THEARPY RXD/TAKEN: ICD-10-PCS | Mod: CPTII,S$GLB,,

## 2022-10-24 PROCEDURE — 3044F PR MOST RECENT HEMOGLOBIN A1C LEVEL <7.0%: ICD-10-PCS | Mod: CPTII,S$GLB,,

## 2022-10-24 PROCEDURE — 1159F PR MEDICATION LIST DOCUMENTED IN MEDICAL RECORD: ICD-10-PCS | Mod: CPTII,S$GLB,,

## 2022-10-24 PROCEDURE — 99214 OFFICE O/P EST MOD 30 MIN: CPT | Mod: S$GLB,,,

## 2022-10-24 PROCEDURE — 1125F AMNT PAIN NOTED PAIN PRSNT: CPT | Mod: CPTII,S$GLB,,

## 2022-10-24 PROCEDURE — 1159F MED LIST DOCD IN RCRD: CPT | Mod: CPTII,S$GLB,,

## 2022-10-24 PROCEDURE — 3074F PR MOST RECENT SYSTOLIC BLOOD PRESSURE < 130 MM HG: ICD-10-PCS | Mod: CPTII,S$GLB,,

## 2022-10-24 PROCEDURE — 3044F HG A1C LEVEL LT 7.0%: CPT | Mod: CPTII,S$GLB,,

## 2022-10-24 PROCEDURE — 3288F PR FALLS RISK ASSESSMENT DOCUMENTED: ICD-10-PCS | Mod: CPTII,S$GLB,,

## 2022-10-24 PROCEDURE — 99214 PR OFFICE/OUTPT VISIT, EST, LEVL IV, 30-39 MIN: ICD-10-PCS | Mod: S$GLB,,,

## 2022-10-24 PROCEDURE — 3288F FALL RISK ASSESSMENT DOCD: CPT | Mod: CPTII,S$GLB,,

## 2022-10-24 PROCEDURE — 3074F SYST BP LT 130 MM HG: CPT | Mod: CPTII,S$GLB,,

## 2022-10-24 PROCEDURE — 1125F PR PAIN SEVERITY QUANTIFIED, PAIN PRESENT: ICD-10-PCS | Mod: CPTII,S$GLB,,

## 2022-10-24 RX ORDER — NAPROXEN 500 MG/1
1 TABLET ORAL 2 TIMES DAILY WITH MEALS
COMMUNITY
Start: 2021-12-03 | End: 2023-07-26

## 2022-10-24 RX ORDER — GABAPENTIN 300 MG/1
600 CAPSULE ORAL 2 TIMES DAILY
Qty: 120 CAPSULE | Refills: 2 | Status: SHIPPED | OUTPATIENT
Start: 2022-10-24 | End: 2022-11-02 | Stop reason: SDUPTHER

## 2022-10-24 RX ORDER — TIZANIDINE 4 MG/1
4 TABLET ORAL EVERY 6 HOURS PRN
Qty: 30 TABLET | Refills: 1 | Status: SHIPPED | OUTPATIENT
Start: 2022-10-24 | End: 2022-11-03

## 2022-10-24 RX ORDER — OXYCODONE AND ACETAMINOPHEN 5; 325 MG/1; MG/1
1 TABLET ORAL EVERY 12 HOURS PRN
Qty: 42 TABLET | Refills: 0 | Status: SHIPPED | OUTPATIENT
Start: 2022-10-24 | End: 2022-11-14

## 2022-10-24 NOTE — PROGRESS NOTES
Outpatient Care Management  Initial Patient Assessment    Patient: Bossman Duke  MRN: [id number]  Date of Service: 10/24/2022  Completed by: Annette Forbes RN  Referral Date: 10/12/2022  Program: High Risk  Status: Ongoing  Effective Dates: 10/24/2022 - present  Responsible Staff: Annette Forbes RN        Reason for Visit   Patient presents with    OPCM Chart Review     10/24/22    OPCM Enrollment Call       Brief Summary:  Bossman Duke was referred by DENISA Hays NP for left sided back pain. Patient qualifies for program based on high risk score of 79.1%. Active problem list, medical, surgical and social history reviewed. Areas of need identified by patient include help with diet. Complex care plan created with patient/caregiver input.   Med rec done.   Will continue to follow up for education and management.         Assessment Documentation     OPCM Initial Assessment    Involvement of Care  Assessment completed by: Patient  Identified Areas of Need  Housing: no  *Active medication list was reviewed and reconciled with patient and/or caregiver:           Problem List and History         Reviewed medical and social history with patient and/or caregiver. A complex care plan was discussed and completed today, with input from patient and/or caregiver.    Patient Instructions     Instructions were provided via the [employer] patient resources and are available for the patient to view on the patient portal, if active.      Follow up in about 15 days (around 11/8/2022) for RN follow up.    [facility] Self-Management Care Plan was developed with the patients/caregivers input and was based on identified barriers from [facility] assessment.  Goals were written today with the patient/caregiver and the patient has agreed to work towards these goals to improve his/her overall well-being. Patient verbalized understanding of the care plan, goals, and all of today's instructions. Encouraged patient/caregiver to communicate with  his/her physician and health care team about health conditions and the treatment plan.  Provided my contact information today and encouraged patient/caregiver to call me with any questions as needed.

## 2022-10-24 NOTE — PROGRESS NOTES
[facility] Follow Up Evaluation    Referred by: Dr. Nelson GAMBLE  Reason for referral: neck pain    CC:   Chief Complaint   Patient presents with    Back Pain      Last 3 PDI Scores 3/26/2021 2/3/2021   Pain Disability Index (PDI) 0 33     Interval HPI 10/24/2022: Bossman Duke returns to the clinic for follow up. He is s/p L3/4 interlaminar epidural steroid injection on 10/05/2022 without significant relief of his pain.  Today he is reporting continued lower back pain, 10/10, with radiation down left leg.  He reports associated numbness and tingling in left leg.  He denies any carmina weakness but feels he has some pain related weakness.  He denies any significant changes to his bowel bladder function.  He has been taking gabapentin 600 mg b.i.d., muscle relaxers, tylenol and pain medication that was prescribed to him from the ED without significant relief of his pain. He was recently evaluated by Neurosurgery in updated imaging of his cervical spine was ordered.      Pain Intervention History:  - s/p b/l L3/4 and L4/5 RFA on 3/2021 with 50% relief of his pain.  - s/p b/l L3/4 and L4/5 RFA on 08/23/2022 with 0% relief.  - s/p L3/4 interlaminar epidural steroid injection on 10/05/2022 without significant relief of his pain.     HPI:   Bossman Duke is a 67 y.o. male who complains of neck pain    Onset: at least a few years  Progression: since onset, pain is gradually worsening  Current Pain Score: 10/10  Typical Range: 5-9/10  Timing: intermittent  Quality: dull, aching  Radiation: no  Associated numbness or weakness: no numbness, no weakness   Exacerbated by: standing, back extension  Allievated by: rest  Is Pain Level Acceptable?: No    Previous Therapies:  PT/OT:   HEP:   Interventions:   Surgery:  - h/o cervical fusion 7 years ago  - h/o lumbar likely laminectomy 5 ago  Medications:   - NSAIDS:   - MSK Relaxants:   - TCAs:   - SNRIs:   - Topicals:   - Anticonvulsants:  - Opioids:      History:    Current Outpatient Medications:     aspirin (ECOTRIN) 81 MG EC tablet, Take 81 mg by mouth once daily., Disp: , Rfl:     clopidogreL (PLAVIX) 75 mg tablet, TAKE ONE TABLET BY MOUTH DAILY, Disp: 90 tablet, Rfl: 3    cyanocobalamin (VITAMIN B-12) 100 MCG tablet, Take 100 mcg by mouth once daily., Disp: , Rfl:     famotidine (PEPCID) 20 MG tablet, Take 20 mg by mouth as needed., Disp: , Rfl:     fluticasone-umeclidin-vilanter (TRELEGY ELLIPTA) 100-62.5-25 mcg DsDv, Inhale 1 puff into the lungs once daily., Disp: 30 each, Rfl: 11    furosemide (LASIX) 40 MG tablet, Take 1 tablet (40 mg total) by mouth daily as needed (as needed for leg swelling). (Patient taking differently: Take 40 mg by mouth once daily. Except on Tuesday), Disp: 90 tablet, Rfl: 3    metFORMIN (GLUCOPHAGE-XR) 500 MG ER 24hr tablet, Take 1 tablet (500 mg total) by mouth daily with breakfast., Disp: 90 tablet, Rfl: 3    metOLazone (ZAROXOLYN) 2.5 MG tablet, Take 1 tablet (2.5 mg total) by mouth once a week., Disp: 4 tablet, Rfl: 6    multivit-mins no.63/iron/folic (M-VIT ORAL), Take 1 tablet by mouth once daily., Disp: , Rfl:     naproxen (NAPROSYN) 500 MG tablet, Take 1 tablet by mouth 2 times daily with meals., Disp: , Rfl:     NIFEdipine (PROCARDIA-XL) 60 MG (OSM) 24 hr tablet, Take 1 tablet (60 mg total) by mouth every evening., Disp: 90 tablet, Rfl: 1    predniSONE (DELTASONE) 50 MG Tab, Take 1 tablet (50 mg total) by mouth once daily., Disp: 5 tablet, Rfl: 0    tiZANidine (ZANAFLEX) 4 MG tablet, Take 4 mg by mouth every evening., Disp: , Rfl:     valsartan (DIOVAN) 320 MG tablet, Take 1 tablet (320 mg total) by mouth once daily., Disp: 90 tablet, Rfl: 1    gabapentin (NEURONTIN) 300 MG capsule, Take 2 capsules (600 mg total) by mouth 2 (two) times daily., Disp: 120 capsule, Rfl: 02    oxyCODONE-acetaminophen (PERCOCET) 5-325 mg per tablet, Take 1 tablet by mouth every 12 (twelve) hours as needed for Pain., Disp: 42 tablet, Rfl: 0    " tiZANidine (ZANAFLEX) 4 MG tablet, Take 1 tablet (4 mg total) by mouth every 6 (six) hours as needed., Disp: 30 tablet, Rfl: 01    Past Medical History:   Diagnosis Date    Anticoagulant long-term use     Arthritis     COPD (chronic obstructive pulmonary disease)     Hypertension     PAD (peripheral artery disease)     PUD (peptic ulcer disease)        Past Surgical History:   Procedure Laterality Date    APPENDECTOMY      CERVICAL SPINE SURGERY      EPIDURAL STEROID INJECTION INTO LUMBAR SPINE N/A 10/5/2022    Procedure: Injection-steroid-epidural-lumbar L3/4;  Surgeon: Linwood Aponte MD;  Location: [facility] OR;  Service: Pain Management;  Laterality: N/A;    EYE SURGERY Left     "as  a child"    INJECTION OF ANESTHETIC AGENT AROUND MEDIAL BRANCH NERVES INNERVATING LUMBAR FACET JOINT Bilateral 2/9/2021    Procedure: Block-nerve-medial branch-lumbar L3/4, L4/5, L5/S1;  Surgeon: Linwood Aponte MD;  Location: [facility] OR;  Service: Pain Management;  Laterality: Bilateral;    LUMBAR DISC SURGERY      PERCUTANEOUS TRANSLUMINAL ANGIOPLASTY N/A 2/15/2021    Procedure: Pta (Angioplasty, Percutaneous, Transluminal);  Surgeon: Jl Neil MD;  [facility];  Service: Cardiology;  Laterality: N/A;    RADIOFREQUENCY ABLATION Right 3/1/2021    Procedure: Radiofrequency Ablation L3/4, L4/5;  Surgeon: Linwood Aponte MD;  Location: [facility] OR;  Service: Pain Management;  Laterality: Right;    RADIOFREQUENCY ABLATION Left 3/10/2021    Procedure: Radiofrequency Ablation L3/4, L4/5;  Surgeon: Linwood Aponte MD;  Location: [facility] OR;  Service: Pain Management;  Laterality: Left;    RADIOFREQUENCY ABLATION OF LUMBAR MEDIAL BRANCH NERVE AT SINGLE LEVEL Bilateral 8/23/2022    Procedure: Radiofrequency Ablation, Nerve, Spinal, Lumbar, Medial Branch, L3/4, L4/5;  Surgeon: Linwood Aponte MD;  Location: [facility] OR;  Service: Pain Management;  Laterality: Bilateral;       Family History   Problem Relation Age of Onset    Diabetes " "Mother     Heart disease Mother     Heart disease Father     Glaucoma Neg Hx     Macular degeneration Neg Hx     Retinal detachment Neg Hx        Social History     Socioeconomic History    Marital status:    Tobacco Use    Smoking status: Former     Packs/day: 0.25     Types: Cigarettes    Smokeless tobacco: Never    Tobacco comments:     Quit about 2 months ago in Feb 2021   Substance and Sexual Activity    Alcohol use: Yes     Alcohol/week: 2.0 standard drinks     Types: 2 Cans of beer per week     Comment: every other week    Drug use: Never       Review of patient's allergies indicates:   Allergen Reactions    Benicar [olmesartan] Other (See Comments)     Gave him severe fatigue and muscle cramps.     Lisinopril Other (See Comments)     Fatigue and insomnia    Metoprolol Shortness Of Breath       Review of Systems:  General ROS: negative for - fever  Psychological ROS: negative for - hostility  Hematological and Lymphatic ROS: negative for - bleeding problems  Endocrine ROS: negative for - unexpected weight changes  Respiratory ROS: no cough, shortness of breath, or wheezing  Cardiovascular ROS: no chest pain or dyspnea on exertion  Gastrointestinal ROS: no abdominal pain, change in bowel habits, or black or bloody stools  Musculoskeletal ROS: negative for - muscular weakness  Neurological ROS: negative for - bowel and bladder control changes or numbness/tingling  Dermatological ROS: negative for rash    Physical Exam:  Vitals:    10/24/22 0930   BP: 128/66   Pulse: 91   SpO2: 95%   Height: 5' 9.5" (1.765 m)   PainSc:   6   PainLoc: Back     Body mass index is 41.78 kg/m².     Gen: NAD  Psych: mood appropriate for given condition  CV: 2+ radial pulse  HEENT: anicteric   Respiratory: non labored  Abd: soft nt, nd  Gait: uses cane, in wheelchair today   ROM: limited AROM of the L spine in all planes, full ROM at ankles, knees and hips  Lumbar flexion 90 degrees, extension 50 degrees, side bending 30 " degrees.    Sensation: intact to light touch in all dermatomes tested from L2-S1 bilaterally  Reflexes: 2+ right patella 3+ left patella, 2+ right achilles and 1+ left achilles  Palpation: Diffusely tender over lumbar paraspinals  -TTP over the b/l greater trochanters and bilateral SI joint  Tone: normal in the b/l knees and hips   Skin: intact  Extremities: No edema in b/l ankles or hands  Provacative tests:        Right Left   L2/3 Iliacus Hip flexion  5  5   L3/4 Qudratus Femoris Knee Extension  5  5   L4/5 Tib Anterior Ankle Dorsiflexion   5  5   L5/S1 Extensor Hallicus Longus Great toe extension  5  5                 S1/S2 Gastroc/Soleus Plantar Flexion  5  5     Imaging:  U/S BRISEIDA's 1/26/21  Impression:     No evidence of acute deep venous thrombosis in the left or right lower extremity veins.    Xray cervical spine 7/9/20  FINDINGS:  The neck is short.  Even on swimmer's view C7-T1 is not well seen.  The patient has had prior anterior cervical fusion performed at C3-4 and C4-5 with anterior plate and screws and interbody device is noted.  There appears to be bone union.  There is disc space narrowing identified at C5-6 and probably C6-7 on one view.  Neural foraminal stenosis is demonstrated at every level from C3 down.    Xray lumbar spine 2/3/21  FINDINGS:  Osseous demineralization is noted.  No acute fracture or dislocation is noted.  Facet arthropathy is noted at L3-L4, L4-L5, and L5-S1 levels.  Intervertebral disc height loss noted at the L3-L4 and L5-S1 levels.  Atherosclerotic aortic changes are noted.  No change in alignment is noted upon flexion and extension.  Vertebral body heights appear well preserved.    MRI cervical spine 2/4/21  FINDINGS:  CORD: Slightly decreased cord caliber through mid lower cervical spine.  Myelomalacia at the superior C3 and C3-4 levels.    ALIGNMENT: Trace anterolisthesis C7-T1 lateral masses of C1 and C2 are congruent.    BONES: C3-5 ACDF.  Vertebral body heights are  maintained.  Mixed type 1 and 2 endplate changes at C6-7.  No aggressive bone marrow signal.    PARASPINAL AREA: Normal.    CERVICAL DISC LEVELS: Prominent diffuse congenital spinal canal narrowing.    C2-C3: Mild disc osteophyte complex.  Moderate left facet hypertrophy.  Preserved ventral and dorsal CSF.  Mild left foraminal stenosis.  C3-C4: Fused.  Mild-moderate disc osteophyte complex.  Mild-moderate left facet hypertrophy.  Ligamentum flavum thickening.  Mild-moderate cord flattening with thin sliver of preserved ventral and dorsal CSF.  Severe bilateral foraminal stenosis.  C4-C5: Fused.  Mild-moderate disc osteophyte complex.  Mild ventral cord flattening, greatest on the right.  Thin sliver preserved ventral and dorsal CSF.  Moderate-severe bilateral foraminal stenosis.  C5-C6: Moderate disc osteophyte complex.  Mild ventral cord flattening with thin sliver preserved ventral and preserved dorsal CSF.  Severe bilateral foraminal stenosis.  C6-C7: Moderate disc osteophyte complex.  Mild left and minimal right facet hypertrophy.  Ligamentum flavum thickening.  Mild ventral cord flattening, greater on the right.  Effacement of ventral and thin sliver preserved dorsal CSF.  Severe bilateral foraminal stenosis.  C7-T1: Mild disc osteophyte complex. Moderate bilateral facet hypertrophy.  Preserved CSF surrounding the cord.  Moderate bilateral foraminal stenosis.    MRI Lumbar spine 9/24/22   FINDINGS:  There is evidence for prior instrumentation within the dorsal soft tissues.  Vertebral body height and alignment are anatomic.  Marrow signal is mildly heterogeneous.  A few scattered vertebral hemangiomas are present.  The conus terminates at L1-2.  Disc heights are well maintained.  There is moderate multilevel facet arthropathy.     T12-L1 and L1-2 demonstrate no disc herniation, spinal canal narrowing, or neuroforaminal narrowing.   L2-3 demonstrates a moderate central disc extrusion measuring 2.3 x 1.5 by 0.5  cm craniocaudal by transverse by AP.  This contributes to moderate spinal canal narrowing.  L3-4 demonstrates a moderate central disc extrusion measuring 2.3 x 1.0 by 0.4 mm.  This also results in moderate spinal canal narrowing, with facet arthropathy contributory.  Mild bilateral neuroforaminal narrowing is also present.  L4-5 demonstrates mild diffuse disc bulging and moderate bilateral facet arthropathy resulting in mild spinal canal narrowing along with mild bilateral neuroforaminal narrowing.  L5-S1 demonstrates a small broad-based posterior disc bulge.  This along with facet arthropathy results in moderate bilateral neuroforaminal narrowing.     Impression:     Multilevel degenerative lumbar spondylosis with level by level comments above.  Moderate disc extrusions at L2-3 and L3-4 contribute to moderate spinal canal narrowing.        Labs:  BMP  Lab Results   Component Value Date     08/04/2022    K 5.3 (H) 08/04/2022     08/04/2022    CO2 25 08/04/2022    BUN 19 08/04/2022    CREATININE 0.9 08/04/2022    CALCIUM 10.2 08/04/2022    ANIONGAP 10 08/04/2022    ESTGFRAFRICA >60.0 03/03/2022    EGFRNONAA >60.0 03/03/2022     Lab Results   Component Value Date    ALT 33 08/04/2022    AST 19 08/04/2022    ALKPHOS 98 08/04/2022    BILITOT 0.5 08/04/2022       Assessment:   Problem List Items Addressed This Visit    None  Visit Diagnoses       Dorsalgia, unspecified    -  Primary    Relevant Medications    tiZANidine (ZANAFLEX) 4 MG tablet    gabapentin (NEURONTIN) 300 MG capsule    Lumbar radiculopathy        Relevant Medications    gabapentin (NEURONTIN) 300 MG capsule    Cervical myelopathy                  67 y.o. year old male with PMH HTN, PAD presents to the office with neck and back pain.  He reports prior anterior cervical fusion in 2014 and prior lumbar laminectomy in 2016.  Today he says he doesn't feel like he got much relief with either surgery.  Today his pain is 9/10 mostly in his axial  lower back, intermittent, aching.  He has burning in between his shoulder blades and still feels burning into his hands.  He denies any weakness, numbness, or bowel/bladder dysfunction    10/24/2022: Bossman Duke returns to the clinic for follow up. He is s/p L3/4 interlaminar epidural steroid injection on 10/05/2022 without significant relief of his pain.  Today he is reporting continued lower back pain, 10/10, with radiation down left leg.  He reports associated numbness and tingling in left leg.  He denies any carmina weakness but feels he has some pain related weakness.  He denies any significant changes to his bowel bladder function.  He has been taking gabapentin 600 mg b.i.d., muscle relaxers, tylenol and pain medication that was prescribed to him from the ED without significant relief of his pain. He was recently evaluated by Neurosurgery in updated imaging of his cervical spine was ordered.    - on exam he has full strength.  - He is s/p L3/4 interlaminar epidural steroid injection on 10/05/2022 without significant relief of his pain.   - I reviewed his most recent lumbar MRI is consistent with at L2-3 demonstrates a moderate central disc extrusion measuring 2.3 x 1.5 by 0.5 cm craniocaudal by transverse by AP.  This contributes to moderate spinal canal narrowing. L3-4 demonstrates a moderate central disc extrusion measuring 2.3 x 1.0 by 0.4 mm.  This also results in moderate spinal canal narrowing, with facet arthropathy contributory.  Mild bilateral neuroforaminal narrowing is also present.  - his pain continues to limit his mobility interfere with his ADLs  - he has been evaluated by Neurosurgery and they are considering surgical interventions.  He reports no significant neck pain and strictly lower back pain.  However, the surgeons are concerned about his body habitus and condition of his cervical spine and prior to proceeding with any lumbar interventions they are recommending cervical surgery to ensure  he could handle a lumbar surgery.  - today he is requesting a short course of pain medication to bridge him until he makes a decision on surgical interventions.  - we discussed this is not going to be a long-term solution and he is agreeable.  He does not want to be on pain medication long term.  - at this time I feel like it is reasonable to treat his pain with opiates in the interim.  - prescription for oxycodone 5/325mg #42 tablets sent to Dr. Aponte today for approval.  This will be enough medication to bridge him until he gets updated imaging and discuss further treatment with Neurosurgery.  - continued to take Tylenol, gabapentin 600 mg b.i.d..  Refill for gabapentin provided today.  - he may be a SCS candidate if he is not a surgical candidate.        : Not applicable    The above note was completed, in part, with the aid of Dragon dictation software/hardware. Translation errors may be present.    The total time spent for evaluation and management on 10/24/2022 including reviewing separately obtained history, performing a medically appropriate exam and evaluation, documenting clinical information in the health record, independently interpreting results and communicating them to the patient/family/caregiver, and ordering medications/tests/procedures was between 30-39 minutes.

## 2022-10-24 NOTE — LETTER
October 24, 2022    Bossman Duke  [address]  [facility] [address]             [facility] [address] [place] [address] Dear Mr. Duke:    Welcome to [facility] Complex Care Management Program.  It was a pleasure talking with you today.  My name is Annette Forbes RN. I look forward to working with you as your .  My goal is to help you function at the healthiest and highest level possible.  You can contact me directly at [phone].    As an [facility] patient with [employer], some of the services we may be able to provide include:      Development of an individualized care plan with a Registered Nurse    Connection with a    Connection with available resources and services     Coordinate communication among your care team members    Provide coaching and education    Help you understand your doctors treatment plan   Help you obtain information about your insurance coverage.     All services provided by [facility]s Complex Care Managers and other care team members are coordinated with and communicated to your primary care team.    As part of your enrollment, you will be receiving education materials and more information about these services in your [facility] account, by phone or through the mail.  If you do not wish to participate or receive information, please contact our office at [phone].      Sincerely,    Annette Forbes RN.   [facility]   Out-patient RN Complex Care Manager

## 2022-10-31 ENCOUNTER — TELEPHONE (OUTPATIENT)
Dept: FAMILY MEDICINE | Facility: CLINIC | Age: 67
End: 2022-10-31
Payer: MEDICARE

## 2022-10-31 ENCOUNTER — PATIENT MESSAGE (OUTPATIENT)
Dept: FAMILY MEDICINE | Facility: CLINIC | Age: 67
End: 2022-10-31
Payer: MEDICARE

## 2022-11-01 ENCOUNTER — OUTPATIENT CASE MANAGEMENT (OUTPATIENT)
Dept: ADMINISTRATIVE | Facility: OTHER | Age: 67
End: 2022-11-01
Payer: MEDICARE

## 2022-11-01 ENCOUNTER — TELEPHONE (OUTPATIENT)
Dept: PAIN MEDICINE | Facility: CLINIC | Age: 67
End: 2022-11-01
Payer: MEDICARE

## 2022-11-01 DIAGNOSIS — M54.16 LUMBAR RADICULOPATHY: ICD-10-CM

## 2022-11-01 DIAGNOSIS — M54.9 DORSALGIA, UNSPECIFIED: ICD-10-CM

## 2022-11-01 NOTE — PROGRESS NOTES
Outpatient Care Management   - High Risk Patient Assessment    Patient: Bossman Duke  MRN:  [id number]  Date of Service:  11/1/2022  Completed by:  Vianca Steel LCSW  Referral Date: 10/12/2022    Reason for Visit   Patient presents with    [facility] Chart Review    Social Work Assessment - High Risk    Plan Of Care    Case Closure       Brief Summary:  received a referral from [facility] for the following patient identified psycho-social needs of educating pt re: [employer] benefits to see if pt is eligible for food.  [facility]  completed high risk assessment on this date.  Pt reports he lives with his brother, pt drives and is independent with ADL's.  Pt is aware of his [employer] OTC benefit and knows how to place an order.  Per pt's [employer] plan, pt is not eligible for the healthy food card. Pt is over the limit for SNAP benefits. Pt verbalizes no other social work needs.  Will close case on this date and notify [facility] RN re: case closure.    Patient Summary     [facility] Social Work Assessment (High Risk)    Involvement of Care  Do I have permission to speak with other family members about your care?: Yes (Comment: pt's brother)  Assessment completed by: Patient  Cognitive  Which of the following can you state?: Name, Date of birth  Cognitive barriers?: No  General  Marital status:   Current employment status: Retired and not working  Support  Level of Caregiver support: Member independent and does not need caregiver assistance  Support system: Family  Is the caregiver reporting burnout?: No  Advanced Care Planning  Do you have any of the following?: None  If yes, do we have a copy?: N/A  If no, would you like Advance Directive resources?: No  Advance Care Planning resources provided?: No  Is Advance Care Planning an area of need?: Yes  Financial  Current medical coverage: Medicare Advantage (Comment: pt has [employer])  Have you applied for government assistance programs?: No  Are you  unable to pay any of the following?: None  Gross monthly income: 1700  Financial Support Barriers?: No  Safety  Safety barriers?: No   History  Do you or your spouse currently or formerly serve in the ?: No  Disaster Plan  Established evacuation plan?: Yes  [place] residence: [facility]  Registered for evacuation?: No  Ability to evacuate: N/A  Mental Health Status  Emotional status: Stable  Have you recenetly lost a loved one?: No  Psychiatric diagnosis: none  Current mental health treatment: No  Would you like mental health resources?: No  Current symptoms: None  Mental/Behavioral/Environmental risk: None  Mental Health Barriers?: No  Addictive Behaviors  Current alcohol consumption?: No  Current substance abuse?: No  Gambling habits?: No  Was the PHQ depression screening completed?: No  Was the CHRISTIAN-7 completed?: No         Complex Care Plan     Care plan was discussed and completed today with input from patient and/or caregiver.    Patient Instructions     No follow-ups on file.    Todays [facility] Self-Management Care Plan was developed with the patients/caregivers input and was based on identified barriers from todays assessment.  Goals were written today with the patient/caregiver and the patient has agreed to work towards these goals to improve his/her overall well-being. Patient verbalized understanding of the care plan, goals, and all of today's instructions. Encouraged patient/caregiver to communicate with his/her physician and health care team about health conditions and the treatment plan.  Provided my contact information today and encouraged patient/caregiver to call me with any questions as needed.

## 2022-11-01 NOTE — TELEPHONE ENCOUNTER
Dr. Turner ordered the imaging.  I think it is best for him to speak with Dr. Turner prior to us as Dr. Turner was considering surgical interventions based on the imaging.

## 2022-11-01 NOTE — TELEPHONE ENCOUNTER
Call returned to Pt to advise per MIGUEL ÁNGEL Millan, he thinks it is best for him to speak with Dr. Turner prior to us as   Dr. Turner was considering surgical interventions based on the imaging.    Pt verbalized understanding and states that he is out of Gabapentin d/t doubling up on them d/t still not having pain relief. He is still taking the Oxycodone and the Tizanidine as ordered. Pt states the medications are not working and he needs relief.     Message forwarded to MIGUEL ÁNGEL Millan for review.

## 2022-11-01 NOTE — TELEPHONE ENCOUNTER
----- Message from Jose Estes sent at 10/31/2022  9:05 AM CDT -----  Type: Needs Medical Advice  Who Called:  pt   Symptoms (please be specific):  pt said he need to speak to the dr about the pain in his back and said he will tell him the rest when he call him--please call and advise  Best Call Back Number: [phone] (home)     Additional Information: thank you

## 2022-11-02 ENCOUNTER — PATIENT MESSAGE (OUTPATIENT)
Dept: NEUROSURGERY | Facility: CLINIC | Age: 67
End: 2022-11-02
Payer: MEDICARE

## 2022-11-02 DIAGNOSIS — Z98.1 HISTORY OF FUSION OF CERVICAL SPINE: Primary | ICD-10-CM

## 2022-11-02 DIAGNOSIS — M48.02 CERVICAL STENOSIS OF SPINAL CANAL: ICD-10-CM

## 2022-11-02 RX ORDER — GABAPENTIN 300 MG/1
600 CAPSULE ORAL 2 TIMES DAILY
Qty: 120 CAPSULE | Refills: 2 | Status: SHIPPED | OUTPATIENT
Start: 2022-11-02 | End: 2023-03-14

## 2022-11-02 NOTE — TELEPHONE ENCOUNTER
Call placed to Pt to advise that a refill has been sent in for gabapentin. Pt verbalized understanding and states that he has placed a call but has not spoken with Dr. Turner's office yet.

## 2022-11-02 NOTE — TELEPHONE ENCOUNTER
Refill for gabapentin sent.    I have reviewed the [place] Board of Pharmacy website and there are no abberancies.

## 2022-11-07 ENCOUNTER — TELEPHONE (OUTPATIENT)
Dept: NEUROSURGERY | Facility: CLINIC | Age: 67
End: 2022-11-07
Payer: MEDICARE

## 2022-11-07 DIAGNOSIS — M54.16 LUMBAR RADICULOPATHY: ICD-10-CM

## 2022-11-07 DIAGNOSIS — G95.9 CERVICAL MYELOPATHY: Primary | ICD-10-CM

## 2022-11-07 NOTE — TELEPHONE ENCOUNTER
Tried to contact patient about upcoming appt w Neurosurgery on 12/1 with imaging prior. Left vm to call back with questions/concerns.

## 2022-11-14 ENCOUNTER — TELEPHONE (OUTPATIENT)
Dept: NEUROSURGERY | Facility: CLINIC | Age: 67
End: 2022-11-14
Payer: MEDICARE

## 2022-11-14 NOTE — TELEPHONE ENCOUNTER
TERESITA w/ call back number regarding below message.      ----- Message from Rachel Zarate sent at 11/14/2022  9:54 AM CST -----  Contact: pt  Pt requesting a callback pt has a few questions about procedures             Confirmed contact below:  Contact Name:Bossman Duke  Phone Number: [phone]

## 2022-11-15 ENCOUNTER — OUTPATIENT CASE MANAGEMENT (OUTPATIENT)
Dept: ADMINISTRATIVE | Facility: OTHER | Age: 67
End: 2022-11-15
Payer: MEDICARE

## 2022-11-15 NOTE — PROGRESS NOTES
11/15/22 Attempt assessment with patient/caregiver. No answer. Left message requesting call back. RN OPCM  first f/u attempt

## 2022-11-16 ENCOUNTER — TELEPHONE (OUTPATIENT)
Dept: NEUROSURGERY | Facility: CLINIC | Age: 67
End: 2022-11-16
Payer: MEDICARE

## 2022-11-16 NOTE — TELEPHONE ENCOUNTER
----- Message from Karolina Clay MA sent at 11/16/2022  9:36 AM CST -----  Regarding: pt advice  Contact: pt [phone]  Patient is called needing to speak with some one in the office needing clarification before he come down for his appt. Please call pt [phone]

## 2022-11-22 ENCOUNTER — OUTPATIENT CASE MANAGEMENT (OUTPATIENT)
Dept: ADMINISTRATIVE | Facility: OTHER | Age: 67
End: 2022-11-22
Payer: MEDICARE

## 2022-11-22 ENCOUNTER — TELEPHONE (OUTPATIENT)
Dept: NEUROSURGERY | Facility: CLINIC | Age: 67
End: 2022-11-22
Payer: MEDICARE

## 2022-11-22 NOTE — PROGRESS NOTES
Outpatient Care Management  Plan of Care Follow Up Visit    Patient: Bossman Duke  MRN: [id number]  Date of Service: 11/22/2022  Completed by: Annette Forbes RN  Referral Date: 10/12/2022    Reason for Visit   Patient presents with    OPCM Chart Review     11/22/22    Update Plan Of Care     11/22/22       Brief Summary: Phone contact made with Mr. Duke today. We discussed his diabetes care plan. The patient had some questions about upcomming tests. Collaborated with the medical assistant Guicho. They will give pt a call about tests ordered. Will continue to follow up for education and management.     Patient Summary     Involvement of Care:  Do I have permission to speak with other family members about your care?       Patient Reported Labs & Vitals:  1.  Any Patient Reported Labs & Vitals?     2.  Patient Reported Blood Pressure:     3.  Patient Reported Pulse:     4.  Patient Reported Weight (Kg):     5.  Patient Reported Blood Glucose (mg/dl):       Medical and social history was reviewed with patient and/or caregiver.     Clinical Assessment     Reviewed and provided basic information on available community resources for mental health, transportation, wellness resources, and palliative care programs with patient and/or caregiver.     Complex Care Plan     Care plan was discussed and completed today with input from patient and/or caregiver.    Patient Instructions     Instructions were provided via the [employer] patient resources and are available for the patient to view on the patient portal.      Follow up in about 2 weeks (around 12/6/2022) for RN follow up.    [facility] OPCM Self-Management Care Plan was developed with the patients/caregivers input and was based on identified barriers from [facility] assessment.  Goals were written today with the patient/caregiver and the patient has agreed to work towards these goals to improve his/her overall well-being. Patient verbalized understanding of the care plan,  goals, and all of today's instructions. Encouraged patient/caregiver to communicate with his/her physician and health care team about health conditions and the treatment plan.  Provided my contact information today and encouraged patient/caregiver to call me with any questions as needed.

## 2022-11-22 NOTE — TELEPHONE ENCOUNTER
----- Message from Blanca Orozco MA sent at 11/22/2022  2:45 PM CST -----  Regarding: FW: Please call  Contact: Pt [phone]    ----- Message -----  From: Libby Marcum CMA  Sent: 11/22/2022   2:30 PM CST  To: [facility] Staff  Subject: Please call                                      Patient states he would like to speak to MEGHAN Lindo regarding imaging. Please call the patient.

## 2022-12-06 ENCOUNTER — OUTPATIENT CASE MANAGEMENT (OUTPATIENT)
Dept: ADMINISTRATIVE | Facility: OTHER | Age: 67
End: 2022-12-06
Payer: MEDICARE

## 2022-12-06 NOTE — PROGRESS NOTES
Outpatient Care Management  Plan of Care Follow Up Visit    Patient: Bossman Duke  MRN: [id number]  Date of Service: 12/06/2022  Completed by: Annette Forbes RN  Referral Date: 10/12/2022    Reason for Visit   Patient presents with    OPCM Chart Review     12/07/22       Brief Summary: Phone contact made with Mr. Duke today. We discussed his diabetes care plan. The patient is interested in starting PT. Sent message to PCP about PT order. Will continue to follow.

## 2022-12-07 ENCOUNTER — TELEPHONE (OUTPATIENT)
Dept: FAMILY MEDICINE | Facility: CLINIC | Age: 67
End: 2022-12-07
Payer: MEDICARE

## 2022-12-07 DIAGNOSIS — M47.816 LUMBAR SPONDYLOSIS: Primary | ICD-10-CM

## 2022-12-07 NOTE — TELEPHONE ENCOUNTER
----- Message from Annette Forbes RN sent at 12/7/2022  2:08 PM CST -----  Good afternoon,     I spoke with this patient today. He is wondering if he can start PT for his back pain/discomfort. The patient stated that he has not been having to take his pain medication as much but he is looking for more education/support to increase his mobility and muscle strength.     Thank you for your assistance,   Annette Forbes RN  Outpatient Complex Care Management  [phone]

## 2022-12-21 ENCOUNTER — OUTPATIENT CASE MANAGEMENT (OUTPATIENT)
Dept: ADMINISTRATIVE | Facility: OTHER | Age: 67
End: 2022-12-21
Payer: MEDICARE

## 2022-12-21 NOTE — PROGRESS NOTES
12/21/22 Attempt assessment with patient/caregiver. No answer. Left message requesting call back. RN OPCM  first f/u attempt

## 2022-12-26 ENCOUNTER — PATIENT MESSAGE (OUTPATIENT)
Dept: NEUROSURGERY | Facility: CLINIC | Age: 67
End: 2022-12-26
Payer: MEDICARE

## 2022-12-28 ENCOUNTER — OUTPATIENT CASE MANAGEMENT (OUTPATIENT)
Dept: ADMINISTRATIVE | Facility: OTHER | Age: 67
End: 2022-12-28
Payer: MEDICARE

## 2022-12-28 NOTE — PROGRESS NOTES
Outpatient Care Management  Plan of Care Follow Up Visit    Patient: Bossman Duke  MRN: [id number]  Date of Service: 12/28/2022  Completed by: Annette Forbes RN  Referral Date: 10/12/2022    Reason for Visit   Patient presents with    OPCM Chart Review     12/28/22    Update Plan Of Care       Brief Summary: Phone contact made with Mr. Duke today. We discussed his care plan. No new needs identified at this time. Will continue to follow.   Next Steps: f/u on PT.

## 2023-01-11 ENCOUNTER — OUTPATIENT CASE MANAGEMENT (OUTPATIENT)
Dept: ADMINISTRATIVE | Facility: OTHER | Age: 68
End: 2023-01-11
Payer: MEDICARE

## 2023-01-11 NOTE — PROGRESS NOTES
01/11/23 Attempt assessment with patient/caregiver. No answer. Left message requesting call back. RN OPCM  first f/u attempt.

## 2023-01-18 ENCOUNTER — OUTPATIENT CASE MANAGEMENT (OUTPATIENT)
Dept: ADMINISTRATIVE | Facility: OTHER | Age: 68
End: 2023-01-18
Payer: MEDICARE

## 2023-01-18 NOTE — PROGRESS NOTES
Outpatient Care Management  Plan of Care Follow Up Visit    Patient: Bossman Duke  MRN: [id number]  Date of Service: 01/18/2023  Completed by: Annette Forbes RN  Referral Date: 10/12/2022    Reason for Visit   Patient presents with    OPCM Chart Review     01/18/23    Update Plan Of Care       Brief Summary: Phone contact made with Mr. Duke today. We discussed his care plan. Mailed info on exercise. Will continue to follow.

## 2023-02-02 ENCOUNTER — OUTPATIENT CASE MANAGEMENT (OUTPATIENT)
Dept: ADMINISTRATIVE | Facility: OTHER | Age: 68
End: 2023-02-02
Payer: MEDICARE

## 2023-02-03 NOTE — PROGRESS NOTES
02/03/23 Attempt assessment with patient/caregiver. No answer. Left message requesting call back. RN OPCM First follow up attempt.

## 2023-02-07 DIAGNOSIS — Z00.00 ENCOUNTER FOR MEDICARE ANNUAL WELLNESS EXAM: ICD-10-CM

## 2023-02-09 DIAGNOSIS — Z00.00 ENCOUNTER FOR MEDICARE ANNUAL WELLNESS EXAM: ICD-10-CM

## 2023-02-10 ENCOUNTER — OUTPATIENT CASE MANAGEMENT (OUTPATIENT)
Dept: ADMINISTRATIVE | Facility: OTHER | Age: 68
End: 2023-02-10
Payer: MEDICARE

## 2023-02-13 NOTE — PROGRESS NOTES
Outpatient Care Management  Plan of Care Follow Up Visit    Patient: Bossman Duke  MRN: [id number]  Date of Service: 02/10/2023  Completed by: Annette Forbes RN  Referral Date: 10/12/2022    Reason for Visit   Patient presents with    OPCM Chart Review     02/13/23    Update Plan Of Care       Brief Summary: Phone contact made with Mr. Keita today. We discussed his exercise care plan. No new needs identified at this time. Will continue to follow.

## 2023-02-27 ENCOUNTER — OUTPATIENT CASE MANAGEMENT (OUTPATIENT)
Dept: ADMINISTRATIVE | Facility: OTHER | Age: 68
End: 2023-02-27
Payer: MEDICARE

## 2023-03-01 NOTE — PROGRESS NOTES
03/01/23 Attempt assessment with patient/caregiver. No answer. Left message requesting call back. RN OPCM first follow up attempt.

## 2023-03-09 ENCOUNTER — OUTPATIENT CASE MANAGEMENT (OUTPATIENT)
Dept: ADMINISTRATIVE | Facility: OTHER | Age: 68
End: 2023-03-09
Payer: MEDICARE

## 2023-03-14 ENCOUNTER — TELEPHONE (OUTPATIENT)
Dept: FAMILY MEDICINE | Facility: CLINIC | Age: 68
End: 2023-03-14

## 2023-03-14 NOTE — TELEPHONE ENCOUNTER
----- Message from Annette Forbes RN sent at 3/14/2023 12:29 PM CDT -----  Good afternoon,     I spoke with this patient today. He mentioned stopping some of his medications. I wanted to make you aware that the patient is not take the following medications that were still listed on his med list: Vitamin B12, lasix, pepcid, metformin, naproxen,  prednisone, zanaflex and metolazone.   Please call and advise patient if needed.       Thank you for your assistance,   Annette Forbes RN  Outpatient Complex Care Management  [phone]

## 2023-03-14 NOTE — PROGRESS NOTES
Outpatient Care Management  Plan of Care Follow Up Visit    Patient: Bossman Duke  MRN: [id number]  Date of Service: 03/09/2023  Completed by: Annette Forbes RN  Referral Date: 10/12/2022    Reason for Visit   Patient presents with    OPCM Chart Review    Update Plan Of Care       Brief Summary: Phone contact made with Mr. Keita today. We discussed his care plan. Sent message to PCP about med changes. Will continue to follow.

## 2023-03-15 ENCOUNTER — OFFICE VISIT (OUTPATIENT)
Dept: FAMILY MEDICINE | Facility: CLINIC | Age: 68
End: 2023-03-15
Payer: MEDICARE

## 2023-03-15 VITALS
HEART RATE: 82 BPM | BODY MASS INDEX: 42.53 KG/M2 | DIASTOLIC BLOOD PRESSURE: 88 MMHG | SYSTOLIC BLOOD PRESSURE: 138 MMHG | WEIGHT: 292.25 LBS

## 2023-03-15 DIAGNOSIS — J44.9 CHRONIC OBSTRUCTIVE PULMONARY DISEASE, UNSPECIFIED COPD TYPE: ICD-10-CM

## 2023-03-15 DIAGNOSIS — E66.01 MORBID (SEVERE) OBESITY DUE TO EXCESS CALORIES: ICD-10-CM

## 2023-03-15 DIAGNOSIS — E78.00 ELEVATED CHOLESTEROL: ICD-10-CM

## 2023-03-15 DIAGNOSIS — I73.9 PVD (PERIPHERAL VASCULAR DISEASE): Primary | ICD-10-CM

## 2023-03-15 DIAGNOSIS — I50.30 DIASTOLIC CONGESTIVE HEART FAILURE, UNSPECIFIED HF CHRONICITY: ICD-10-CM

## 2023-03-15 DIAGNOSIS — R73.03 PREDIABETES: ICD-10-CM

## 2023-03-15 DIAGNOSIS — I48.0 PAROXYSMAL ATRIAL FIBRILLATION: ICD-10-CM

## 2023-03-15 LAB
ALBUMIN SERPL BCP-MCNC: 3.9 G/DL (ref 3.5–5.2)
ALP SERPL-CCNC: 93 U/L (ref 55–135)
ALT SERPL W/O P-5'-P-CCNC: 32 U/L (ref 10–44)
ANION GAP SERPL CALC-SCNC: 8 MMOL/L (ref 8–16)
AST SERPL-CCNC: 21 U/L (ref 10–40)
BASOPHILS # BLD AUTO: 0.12 K/UL (ref 0–0.2)
BASOPHILS NFR BLD: 1.1 % (ref 0–1.9)
BILIRUB SERPL-MCNC: 0.5 MG/DL (ref 0.1–1)
BUN SERPL-MCNC: 11 MG/DL (ref 8–23)
CALCIUM SERPL-MCNC: 10 MG/DL (ref 8.7–10.5)
CHLORIDE SERPL-SCNC: 104 MMOL/L (ref 95–110)
CHOLEST SERPL-MCNC: 212 MG/DL (ref 120–199)
CHOLEST/HDLC SERPL: 3.4 {RATIO} (ref 2–5)
CO2 SERPL-SCNC: 29 MMOL/L (ref 23–29)
CREAT SERPL-MCNC: 0.9 MG/DL (ref 0.5–1.4)
DIFFERENTIAL METHOD: ABNORMAL
EOSINOPHIL # BLD AUTO: 0.5 K/UL (ref 0–0.5)
EOSINOPHIL NFR BLD: 4.6 % (ref 0–8)
ERYTHROCYTE [DISTWIDTH] IN BLOOD BY AUTOMATED COUNT: 13.8 % (ref 11.5–14.5)
EST. GFR  (NO RACE VARIABLE): >60 ML/MIN/1.73 M^2
ESTIMATED AVG GLUCOSE: 128 MG/DL (ref 68–131)
GLUCOSE SERPL-MCNC: 129 MG/DL (ref 70–110)
HBA1C MFR BLD: 6.1 % (ref 4–5.6)
HCT VFR BLD AUTO: 45.6 % (ref 40–54)
HDLC SERPL-MCNC: 62 MG/DL (ref 40–75)
HDLC SERPL: 29.2 % (ref 20–50)
HGB BLD-MCNC: 14.8 G/DL (ref 14–18)
IMM GRANULOCYTES # BLD AUTO: 0.06 K/UL (ref 0–0.04)
IMM GRANULOCYTES NFR BLD AUTO: 0.5 % (ref 0–0.5)
LDLC SERPL CALC-MCNC: 129.2 MG/DL (ref 63–159)
LYMPHOCYTES # BLD AUTO: 3.2 K/UL (ref 1–4.8)
LYMPHOCYTES NFR BLD: 28.8 % (ref 18–48)
MCH RBC QN AUTO: 30.6 PG (ref 27–31)
MCHC RBC AUTO-ENTMCNC: 32.5 G/DL (ref 32–36)
MCV RBC AUTO: 94 FL (ref 82–98)
MONOCYTES # BLD AUTO: 1 K/UL (ref 0.3–1)
MONOCYTES NFR BLD: 8.7 % (ref 4–15)
NEUTROPHILS # BLD AUTO: 6.3 K/UL (ref 1.8–7.7)
NEUTROPHILS NFR BLD: 56.3 % (ref 38–73)
NONHDLC SERPL-MCNC: 150 MG/DL
NRBC BLD-RTO: 0 /100 WBC
PLATELET # BLD AUTO: 254 K/UL (ref 150–450)
PMV BLD AUTO: 11.5 FL (ref 9.2–12.9)
POTASSIUM SERPL-SCNC: 5 MMOL/L (ref 3.5–5.1)
PROT SERPL-MCNC: 7.2 G/DL (ref 6–8.4)
RBC # BLD AUTO: 4.83 M/UL (ref 4.6–6.2)
SODIUM SERPL-SCNC: 141 MMOL/L (ref 136–145)
TRIGL SERPL-MCNC: 104 MG/DL (ref 30–150)
TSH SERPL DL<=0.005 MIU/L-ACNC: 3.1 UIU/ML (ref 0.4–4)
WBC # BLD AUTO: 11.19 K/UL (ref 3.9–12.7)

## 2023-03-15 PROCEDURE — 83036 HEMOGLOBIN GLYCOSYLATED A1C: CPT | Mod: HCNC | Performed by: PHYSICIAN ASSISTANT

## 2023-03-15 PROCEDURE — 99214 OFFICE O/P EST MOD 30 MIN: CPT | Mod: HCNC,S$GLB,, | Performed by: PHYSICIAN ASSISTANT

## 2023-03-15 PROCEDURE — 3079F DIAST BP 80-89 MM HG: CPT | Mod: HCNC,CPTII,S$GLB, | Performed by: PHYSICIAN ASSISTANT

## 2023-03-15 PROCEDURE — 1160F RVW MEDS BY RX/DR IN RCRD: CPT | Mod: HCNC,CPTII,S$GLB, | Performed by: PHYSICIAN ASSISTANT

## 2023-03-15 PROCEDURE — 80053 COMPREHEN METABOLIC PANEL: CPT | Mod: HCNC | Performed by: PHYSICIAN ASSISTANT

## 2023-03-15 PROCEDURE — 3008F BODY MASS INDEX DOCD: CPT | Mod: HCNC,CPTII,S$GLB, | Performed by: PHYSICIAN ASSISTANT

## 2023-03-15 PROCEDURE — 3075F SYST BP GE 130 - 139MM HG: CPT | Mod: HCNC,CPTII,S$GLB, | Performed by: PHYSICIAN ASSISTANT

## 2023-03-15 PROCEDURE — 80061 LIPID PANEL: CPT | Mod: HCNC | Performed by: PHYSICIAN ASSISTANT

## 2023-03-15 PROCEDURE — 4010F PR ACE/ARB THEARPY RXD/TAKEN: ICD-10-PCS | Mod: HCNC,CPTII,S$GLB, | Performed by: PHYSICIAN ASSISTANT

## 2023-03-15 PROCEDURE — 85025 COMPLETE CBC W/AUTO DIFF WBC: CPT | Mod: HCNC | Performed by: PHYSICIAN ASSISTANT

## 2023-03-15 PROCEDURE — 1159F MED LIST DOCD IN RCRD: CPT | Mod: HCNC,CPTII,S$GLB, | Performed by: PHYSICIAN ASSISTANT

## 2023-03-15 PROCEDURE — 1101F PR PT FALLS ASSESS DOC 0-1 FALLS W/OUT INJ PAST YR: ICD-10-PCS | Mod: HCNC,CPTII,S$GLB, | Performed by: PHYSICIAN ASSISTANT

## 2023-03-15 PROCEDURE — 36415 COLL VENOUS BLD VENIPUNCTURE: CPT | Mod: HCNC | Performed by: PHYSICIAN ASSISTANT

## 2023-03-15 PROCEDURE — 3075F PR MOST RECENT SYSTOLIC BLOOD PRESS GE 130-139MM HG: ICD-10-PCS | Mod: HCNC,CPTII,S$GLB, | Performed by: PHYSICIAN ASSISTANT

## 2023-03-15 PROCEDURE — 99214 PR OFFICE/OUTPT VISIT, EST, LEVL IV, 30-39 MIN: ICD-10-PCS | Mod: HCNC,S$GLB,, | Performed by: PHYSICIAN ASSISTANT

## 2023-03-15 PROCEDURE — 84443 ASSAY THYROID STIM HORMONE: CPT | Mod: HCNC | Performed by: PHYSICIAN ASSISTANT

## 2023-03-15 PROCEDURE — 3288F PR FALLS RISK ASSESSMENT DOCUMENTED: ICD-10-PCS | Mod: HCNC,CPTII,S$GLB, | Performed by: PHYSICIAN ASSISTANT

## 2023-03-15 PROCEDURE — 3079F PR MOST RECENT DIASTOLIC BLOOD PRESSURE 80-89 MM HG: ICD-10-PCS | Mod: HCNC,CPTII,S$GLB, | Performed by: PHYSICIAN ASSISTANT

## 2023-03-15 PROCEDURE — 3008F PR BODY MASS INDEX (BMI) DOCUMENTED: ICD-10-PCS | Mod: HCNC,CPTII,S$GLB, | Performed by: PHYSICIAN ASSISTANT

## 2023-03-15 PROCEDURE — 1159F PR MEDICATION LIST DOCUMENTED IN MEDICAL RECORD: ICD-10-PCS | Mod: HCNC,CPTII,S$GLB, | Performed by: PHYSICIAN ASSISTANT

## 2023-03-15 PROCEDURE — 3288F FALL RISK ASSESSMENT DOCD: CPT | Mod: HCNC,CPTII,S$GLB, | Performed by: PHYSICIAN ASSISTANT

## 2023-03-15 PROCEDURE — 1126F PR PAIN SEVERITY QUANTIFIED, NO PAIN PRESENT: ICD-10-PCS | Mod: HCNC,CPTII,S$GLB, | Performed by: PHYSICIAN ASSISTANT

## 2023-03-15 PROCEDURE — 4010F ACE/ARB THERAPY RXD/TAKEN: CPT | Mod: HCNC,CPTII,S$GLB, | Performed by: PHYSICIAN ASSISTANT

## 2023-03-15 PROCEDURE — 1126F AMNT PAIN NOTED NONE PRSNT: CPT | Mod: HCNC,CPTII,S$GLB, | Performed by: PHYSICIAN ASSISTANT

## 2023-03-15 PROCEDURE — 1101F PT FALLS ASSESS-DOCD LE1/YR: CPT | Mod: HCNC,CPTII,S$GLB, | Performed by: PHYSICIAN ASSISTANT

## 2023-03-15 PROCEDURE — 1160F PR REVIEW ALL MEDS BY PRESCRIBER/CLIN PHARMACIST DOCUMENTED: ICD-10-PCS | Mod: HCNC,CPTII,S$GLB, | Performed by: PHYSICIAN ASSISTANT

## 2023-03-15 RX ORDER — TIZANIDINE 4 MG/1
4 TABLET ORAL EVERY 8 HOURS
Qty: 90 TABLET | Refills: 1 | Status: SHIPPED | OUTPATIENT
Start: 2023-03-15 | End: 2023-07-26

## 2023-03-15 NOTE — PROGRESS NOTES
Subjective:       Patient ID: Bossman Duke is a 67 y.o. male.    Chief Complaint: Check Up    Patient is a 66 yo male coming in today for his recheck. He apparently stopped all diuretics and Neurontin. He said they made him constipated, incontinent, drowsy.  He reports feeling much better now and is more active.     Patient Active Problem List:     Tobacco use disorder     Seasonal allergies     Essential hypertension     SOB (shortness of breath)     PVD (peripheral vascular disease)     Lumbar spondylosis     COPD (chronic obstructive pulmonary disease)     Atrial fibrillation     Left-sided low back pain with left-sided sciatica     Morbid (severe) obesity due to excess calories   Reviewed his list.     Review of Systems   Constitutional:  Negative for activity change, chills and fatigue.   HENT:  Positive for nasal congestion.    Eyes: Negative.    Respiratory:  Negative for chest tightness, shortness of breath and wheezing.    Cardiovascular:  Negative for chest pain, palpitations and leg swelling.   Gastrointestinal:  Negative for abdominal pain and constipation.   Endocrine: Negative.    Genitourinary: Negative.    Musculoskeletal:  Positive for arthralgias and back pain.   Integumentary:  Negative.   Allergic/Immunologic: Negative.    Neurological:  Negative for dizziness, seizures, weakness and headaches.       Objective:      Physical Exam  Vitals reviewed.   Constitutional:       General: He is not in acute distress.     Appearance: Normal appearance. He is not ill-appearing, toxic-appearing or diaphoretic.   HENT:      Head: Normocephalic and atraumatic.   Neck:      Vascular: No carotid bruit.   Cardiovascular:      Rate and Rhythm: Normal rate and regular rhythm.      Pulses: Normal pulses.      Heart sounds: Normal heart sounds. No murmur heard.    No friction rub. No gallop.   Pulmonary:      Effort: Pulmonary effort is normal. No respiratory distress.      Breath sounds: Normal breath sounds. No  stridor. No wheezing, rhonchi or rales.   Chest:      Chest wall: No tenderness.   Abdominal:      General: There is distension.      Palpations: Abdomen is soft.   Musculoskeletal:      Cervical back: No rigidity or tenderness.      Right lower leg: No edema.      Left lower leg: No edema.   Lymphadenopathy:      Cervical: No cervical adenopathy.   Skin:     General: Skin is warm and dry.   Neurological:      Mental Status: He is alert.   Psychiatric:         Mood and Affect: Mood normal.       Assessment:       Problem List Items Addressed This Visit       PVD (peripheral vascular disease) - Primary    Relevant Orders    Comprehensive Metabolic Panel    Morbid (severe) obesity due to excess calories    COPD (chronic obstructive pulmonary disease)    Atrial fibrillation     Other Visit Diagnoses       Prediabetes        Relevant Orders    TSH    Hemoglobin A1C    CBC Auto Differential    Elevated cholesterol        Relevant Orders    Lipid Panel    Diastolic congestive heart failure, unspecified HF chronicity                  Plan:       PVD (peripheral vascular disease)  -     Comprehensive Metabolic Panel; Future; Expected date: 03/15/2023    Prediabetes  -     TSH; Future; Expected date: 03/15/2023  -     Hemoglobin A1C; Future; Expected date: 03/15/2023  -     CBC Auto Differential; Future; Expected date: 03/15/2023    Elevated cholesterol  -     Lipid Panel; Future; Expected date: 03/15/2023    Morbid (severe) obesity due to excess calories  uncontrolled The patient is asked to make an attempt to improve diet and exercise patterns to aid in medical management of this problem.     Diastolic congestive heart failure, unspecified HF chronicity  The current medical regimen is effective;  continue present plan and medications.   I DO RECOMMEND FOR HIM TO FOLLOW UP WITH DR TRELL MAGUIREING THIS  Paroxysmal atrial fibrillation  The current medical regimen is effective;  continue present plan and medications.   Chronic  obstructive pulmonary disease, unspecified COPD type  The current medical regimen is effective;  continue present plan and medications.   Other orders  -     tiZANidine (ZANAFLEX) 4 MG tablet; Take 1 tablet (4 mg total) by mouth every 8 (eight) hours.  Dispense: 90 tablet; Refill: 1           I spent 30 minutes on this encounter, time includes face-to-face, chart review, documentation, test review and orders.

## 2023-03-21 ENCOUNTER — PES CALL (OUTPATIENT)
Dept: ADMINISTRATIVE | Facility: CLINIC | Age: 68
End: 2023-03-21
Payer: MEDICARE

## 2023-04-05 ENCOUNTER — OUTPATIENT CASE MANAGEMENT (OUTPATIENT)
Dept: ADMINISTRATIVE | Facility: OTHER | Age: 68
End: 2023-04-05
Payer: MEDICARE

## 2023-04-05 NOTE — PROGRESS NOTES
04/05/23 Attempt assessment with patient/caregiver. No answer. Left message requesting call back. RN OPCM  first f/u attempt

## 2023-04-13 ENCOUNTER — OUTPATIENT CASE MANAGEMENT (OUTPATIENT)
Dept: ADMINISTRATIVE | Facility: OTHER | Age: 68
End: 2023-04-13
Payer: MEDICARE

## 2023-04-20 ENCOUNTER — OFFICE VISIT (OUTPATIENT)
Dept: OPTOMETRY | Facility: CLINIC | Age: 68
End: 2023-04-20
Payer: MEDICARE

## 2023-04-20 DIAGNOSIS — H52.201 HYPEROPIA WITH ASTIGMATISM AND PRESBYOPIA, RIGHT: ICD-10-CM

## 2023-04-20 DIAGNOSIS — H54.40 BLINDNESS OF LEFT EYE WITH NORMAL VISION IN CONTRALATERAL EYE: ICD-10-CM

## 2023-04-20 DIAGNOSIS — Z13.5 GLAUCOMA SCREENING: ICD-10-CM

## 2023-04-20 DIAGNOSIS — H52.01 HYPEROPIA WITH ASTIGMATISM AND PRESBYOPIA, RIGHT: ICD-10-CM

## 2023-04-20 DIAGNOSIS — H52.4 HYPEROPIA WITH ASTIGMATISM AND PRESBYOPIA, RIGHT: ICD-10-CM

## 2023-04-20 DIAGNOSIS — H25.11 NUCLEAR SCLEROSIS OF RIGHT EYE: Primary | ICD-10-CM

## 2023-04-20 DIAGNOSIS — H43.811 POSTERIOR VITREOUS DETACHMENT, RIGHT: ICD-10-CM

## 2023-04-20 PROCEDURE — 99999 PR PBB SHADOW E&M-EST. PATIENT-LVL III: ICD-10-PCS | Mod: PBBFAC,HCNC,, | Performed by: OPTOMETRIST

## 2023-04-20 PROCEDURE — 1101F PT FALLS ASSESS-DOCD LE1/YR: CPT | Mod: HCNC,CPTII,S$GLB, | Performed by: OPTOMETRIST

## 2023-04-20 PROCEDURE — 92014 COMPRE OPH EXAM EST PT 1/>: CPT | Mod: HCNC,S$GLB,, | Performed by: OPTOMETRIST

## 2023-04-20 PROCEDURE — 3288F PR FALLS RISK ASSESSMENT DOCUMENTED: ICD-10-PCS | Mod: HCNC,CPTII,S$GLB, | Performed by: OPTOMETRIST

## 2023-04-20 PROCEDURE — 1159F PR MEDICATION LIST DOCUMENTED IN MEDICAL RECORD: ICD-10-PCS | Mod: HCNC,CPTII,S$GLB, | Performed by: OPTOMETRIST

## 2023-04-20 PROCEDURE — 1126F AMNT PAIN NOTED NONE PRSNT: CPT | Mod: HCNC,CPTII,S$GLB, | Performed by: OPTOMETRIST

## 2023-04-20 PROCEDURE — 3044F HG A1C LEVEL LT 7.0%: CPT | Mod: HCNC,CPTII,S$GLB, | Performed by: OPTOMETRIST

## 2023-04-20 PROCEDURE — 1126F PR PAIN SEVERITY QUANTIFIED, NO PAIN PRESENT: ICD-10-PCS | Mod: HCNC,CPTII,S$GLB, | Performed by: OPTOMETRIST

## 2023-04-20 PROCEDURE — 1101F PR PT FALLS ASSESS DOC 0-1 FALLS W/OUT INJ PAST YR: ICD-10-PCS | Mod: HCNC,CPTII,S$GLB, | Performed by: OPTOMETRIST

## 2023-04-20 PROCEDURE — 3288F FALL RISK ASSESSMENT DOCD: CPT | Mod: HCNC,CPTII,S$GLB, | Performed by: OPTOMETRIST

## 2023-04-20 PROCEDURE — 92015 PR REFRACTION: ICD-10-PCS | Mod: HCNC,S$GLB,, | Performed by: OPTOMETRIST

## 2023-04-20 PROCEDURE — 1159F MED LIST DOCD IN RCRD: CPT | Mod: HCNC,CPTII,S$GLB, | Performed by: OPTOMETRIST

## 2023-04-20 PROCEDURE — 99999 PR PBB SHADOW E&M-EST. PATIENT-LVL III: CPT | Mod: PBBFAC,HCNC,, | Performed by: OPTOMETRIST

## 2023-04-20 PROCEDURE — 92014 PR EYE EXAM, EST PATIENT,COMPREHESV: ICD-10-PCS | Mod: HCNC,S$GLB,, | Performed by: OPTOMETRIST

## 2023-04-20 PROCEDURE — 92015 DETERMINE REFRACTIVE STATE: CPT | Mod: HCNC,S$GLB,, | Performed by: OPTOMETRIST

## 2023-04-20 PROCEDURE — 3044F PR MOST RECENT HEMOGLOBIN A1C LEVEL <7.0%: ICD-10-PCS | Mod: HCNC,CPTII,S$GLB, | Performed by: OPTOMETRIST

## 2023-04-20 PROCEDURE — 4010F PR ACE/ARB THEARPY RXD/TAKEN: ICD-10-PCS | Mod: HCNC,CPTII,S$GLB, | Performed by: OPTOMETRIST

## 2023-04-20 PROCEDURE — 4010F ACE/ARB THERAPY RXD/TAKEN: CPT | Mod: HCNC,CPTII,S$GLB, | Performed by: OPTOMETRIST

## 2023-04-20 RX ORDER — GABAPENTIN 300 MG/1
CAPSULE ORAL
COMMUNITY
Start: 2023-04-05 | End: 2023-07-26
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
## 2023-04-20 NOTE — PATIENT INSTRUCTIONS
"DRY EYES -- BURNING OR SLADE SYMPTOMS:  Use Over The Counter artificial tears as needed for dry eye symptoms.   Some common brands include:  Systane, Optive, Refresh, and Thera-Tears.  These drops can be used as frequently as desired, but may be most helpful use during long periods of concentrated work.  For example, reading / working at the computer. Start with 3-4x per day.     Nighttime Ophthalmic gel or ointments are available: Refresh PM, Genteal, and Lacrilube.    Avoid drops that "get redness out" (Visine, Murine, Clear Eyes), as these may contain medication that could further irritate the eyes, especially with chronic use.    ALLERGY EYES -- ITCHING SYMPTOMS:  Over the counter medications include--Pataday, Zaditor, and Alaway.  Use as directed 1-2 drops daily for symptoms of itching / watering eyes.  These drops will not help for dry eye or exposure symptoms.    REDNESS RELIEF:  Lumify---is a good redness reliever that will not cause irritation if used chronically.        FLASHES / FLOATERS / POSTERIOR VITREOUS DETACHMENT    Call the clinic if you have any further changes in symptoms.  Including:  Increased numbers of floaters or flashing lights, dimness or darkness that moves through or stays constant in your vision, or any pain in the eye (s).    You may sometimes see small specks or clouds moving in your field of vision.  They are called FLOATERS.  You can often see them when looking at a plain background, like a blank wall or blue jonn.  Floaters are actually tiny clumps of gel or cells inside the VITREOUS, the clear jelly-like fluid that fills the inside of your eye.    While these objects look like they are in front of your eye, they are actually floating inside.  What you see are the shadows they cast on the RETINA, the nerve layer at the back of the eye that senses light and allows you to see.      POSTERIOR VITREOUS DETACHMENT    The appearance of new floaters may be alarming.  If you suddenly " develop new floaters, you should contact your eye care professional  right away.    The retina can tear if the shrinking vitreous pulls away from the wall of the eye.  This sometimes causes a small amount of bleeding in the eye that may appear as new floaters.    A torn retina is always a serious problem, since it can lead to a retinal detachment.  You should see your eye care professional as soon as possible if:    even one new floater appears suddenly;  you see sudden flashes of light;  you notice other symptoms, like the loss of side vision, or a curtain closes down in your vision        POSTERIOR VITREOUS DETACHMENT is more common for people who:    are nearsighted;  have had cataract surgery;  have had YAG laser surgery of the eye;  have had inflammation inside the eye;  are over age 60.      While some floaters may remain visible, many of them will fade over time and become less noticeable.  Even if you've had some floaters for years, you should have your eyes checked as soon as possible if you notice new ones.    FLASHING LIGHTS    When the vitreous gel rubs or pulls on the retina, you may see what look like flashing lights or lightning streaks.  These flashes can appear off and on for several weeks or months.      Some people experience flashes of light that appear as jagged lines or heat waves in both eyes, lasting 10-20 minutes.  These flashes are caused by a spasm of blood vessels in the brain, which is called a migraine.    If a headache follows these flashes, it's called a migraine headache.  If   no headache occurs, these flashes are called Ophthalmic or Ocular Migraine.           Early Cataracts--not visually significant for surgery consultation.    What Are Cataracts?  A clear lens in the eye focuses light. This lets the eye see images sharply. With age, the lens slowly becomes cloudy. The cloudy lens is a cataract. A cataract scatters light and makes it hard for the eye to focus. Cataracts often  form in both eyes. But one lens may cloud faster than the other.      The Aging of Your Lens    Your lens may cloud so slowly that you don`t notice any vision changes at first. But as the cataract gets worse, the eye has a harder time focusing. In early stages, glasses may help you see better. As the lens gets cloudier, your doctor may recommend surgery to restore your vision.

## 2023-04-27 ENCOUNTER — TELEPHONE (OUTPATIENT)
Dept: FAMILY MEDICINE | Facility: CLINIC | Age: 68
End: 2023-04-27
Payer: MEDICARE

## 2023-04-27 NOTE — TELEPHONE ENCOUNTER
----- Message from Abby Craig sent at 4/26/2023  2:30 PM CDT -----  Name of Who is Calling: MESFIN FRANKEL [[id number]]       What is the request in detail: pt is requesting a call in regards to the last call that he received from the clinic , he isnt clear on what ir is regarding        Can the clinic reply by [facility]: no        What Number to Call Back if not in [facility]: [phone]

## 2023-05-08 ENCOUNTER — OUTPATIENT CASE MANAGEMENT (OUTPATIENT)
Dept: ADMINISTRATIVE | Facility: OTHER | Age: 68
End: 2023-05-08
Payer: MEDICARE

## 2023-05-08 NOTE — PROGRESS NOTES
05/08/23 Attempt assessment with patient/caregiver. No answer. Left message requesting call back. RN OPCM  third f/u attempt. Case closure.

## 2023-05-10 RX ORDER — CLOPIDOGREL BISULFATE 75 MG/1
TABLET ORAL
Qty: 90 TABLET | Refills: 3 | Status: SHIPPED | OUTPATIENT
Start: 2023-05-10 | End: 2024-01-17 | Stop reason: SDUPTHER

## 2023-05-16 ENCOUNTER — OFFICE VISIT (OUTPATIENT)
Dept: PODIATRY | Facility: CLINIC | Age: 68
End: 2023-05-16
Payer: MEDICARE

## 2023-05-16 VITALS — WEIGHT: 292.31 LBS | BODY MASS INDEX: 41.85 KG/M2 | HEIGHT: 70 IN

## 2023-05-16 DIAGNOSIS — G57.53 TARSAL TUNNEL SYNDROME OF BOTH LOWER EXTREMITIES: Primary | ICD-10-CM

## 2023-05-16 PROCEDURE — 99203 PR OFFICE/OUTPT VISIT, NEW, LEVL III, 30-44 MIN: ICD-10-PCS | Mod: ,,, | Performed by: STUDENT IN AN ORGANIZED HEALTH CARE EDUCATION/TRAINING PROGRAM

## 2023-05-16 PROCEDURE — 3288F FALL RISK ASSESSMENT DOCD: CPT | Mod: CPTII,,, | Performed by: STUDENT IN AN ORGANIZED HEALTH CARE EDUCATION/TRAINING PROGRAM

## 2023-05-16 PROCEDURE — 1159F MED LIST DOCD IN RCRD: CPT | Mod: CPTII,,, | Performed by: STUDENT IN AN ORGANIZED HEALTH CARE EDUCATION/TRAINING PROGRAM

## 2023-05-16 PROCEDURE — 99999 PR PBB SHADOW E&M-EST. PATIENT-LVL III: ICD-10-PCS | Mod: PBBFAC,,, | Performed by: STUDENT IN AN ORGANIZED HEALTH CARE EDUCATION/TRAINING PROGRAM

## 2023-05-16 PROCEDURE — 99203 OFFICE O/P NEW LOW 30 MIN: CPT | Mod: ,,, | Performed by: STUDENT IN AN ORGANIZED HEALTH CARE EDUCATION/TRAINING PROGRAM

## 2023-05-16 PROCEDURE — 1160F RVW MEDS BY RX/DR IN RCRD: CPT | Mod: CPTII,,, | Performed by: STUDENT IN AN ORGANIZED HEALTH CARE EDUCATION/TRAINING PROGRAM

## 2023-05-16 PROCEDURE — 1160F PR REVIEW ALL MEDS BY PRESCRIBER/CLIN PHARMACIST DOCUMENTED: ICD-10-PCS | Mod: CPTII,,, | Performed by: STUDENT IN AN ORGANIZED HEALTH CARE EDUCATION/TRAINING PROGRAM

## 2023-05-16 PROCEDURE — 1125F PR PAIN SEVERITY QUANTIFIED, PAIN PRESENT: ICD-10-PCS | Mod: CPTII,,, | Performed by: STUDENT IN AN ORGANIZED HEALTH CARE EDUCATION/TRAINING PROGRAM

## 2023-05-16 PROCEDURE — 1125F AMNT PAIN NOTED PAIN PRSNT: CPT | Mod: CPTII,,, | Performed by: STUDENT IN AN ORGANIZED HEALTH CARE EDUCATION/TRAINING PROGRAM

## 2023-05-16 PROCEDURE — 4010F PR ACE/ARB THEARPY RXD/TAKEN: ICD-10-PCS | Mod: CPTII,,, | Performed by: STUDENT IN AN ORGANIZED HEALTH CARE EDUCATION/TRAINING PROGRAM

## 2023-05-16 PROCEDURE — 1101F PR PT FALLS ASSESS DOC 0-1 FALLS W/OUT INJ PAST YR: ICD-10-PCS | Mod: CPTII,,, | Performed by: STUDENT IN AN ORGANIZED HEALTH CARE EDUCATION/TRAINING PROGRAM

## 2023-05-16 PROCEDURE — 99999 PR PBB SHADOW E&M-EST. PATIENT-LVL III: CPT | Mod: PBBFAC,,, | Performed by: STUDENT IN AN ORGANIZED HEALTH CARE EDUCATION/TRAINING PROGRAM

## 2023-05-16 PROCEDURE — 3008F PR BODY MASS INDEX (BMI) DOCUMENTED: ICD-10-PCS | Mod: CPTII,,, | Performed by: STUDENT IN AN ORGANIZED HEALTH CARE EDUCATION/TRAINING PROGRAM

## 2023-05-16 PROCEDURE — 3288F PR FALLS RISK ASSESSMENT DOCUMENTED: ICD-10-PCS | Mod: CPTII,,, | Performed by: STUDENT IN AN ORGANIZED HEALTH CARE EDUCATION/TRAINING PROGRAM

## 2023-05-16 PROCEDURE — 3008F BODY MASS INDEX DOCD: CPT | Mod: CPTII,,, | Performed by: STUDENT IN AN ORGANIZED HEALTH CARE EDUCATION/TRAINING PROGRAM

## 2023-05-16 PROCEDURE — 1101F PT FALLS ASSESS-DOCD LE1/YR: CPT | Mod: CPTII,,, | Performed by: STUDENT IN AN ORGANIZED HEALTH CARE EDUCATION/TRAINING PROGRAM

## 2023-05-16 PROCEDURE — 3044F HG A1C LEVEL LT 7.0%: CPT | Mod: CPTII,,, | Performed by: STUDENT IN AN ORGANIZED HEALTH CARE EDUCATION/TRAINING PROGRAM

## 2023-05-16 PROCEDURE — 1159F PR MEDICATION LIST DOCUMENTED IN MEDICAL RECORD: ICD-10-PCS | Mod: CPTII,,, | Performed by: STUDENT IN AN ORGANIZED HEALTH CARE EDUCATION/TRAINING PROGRAM

## 2023-05-16 PROCEDURE — 3044F PR MOST RECENT HEMOGLOBIN A1C LEVEL <7.0%: ICD-10-PCS | Mod: CPTII,,, | Performed by: STUDENT IN AN ORGANIZED HEALTH CARE EDUCATION/TRAINING PROGRAM

## 2023-05-16 PROCEDURE — 4010F ACE/ARB THERAPY RXD/TAKEN: CPT | Mod: CPTII,,, | Performed by: STUDENT IN AN ORGANIZED HEALTH CARE EDUCATION/TRAINING PROGRAM

## 2023-05-16 NOTE — PROGRESS NOTES
Subjective:      Patient ID: Bossman Duke is a 67 y.o. male.    Chief Complaint: Foot Pain    Bossman Duke is a 67 y.o. male who  has a past medical history of Anticoagulant long-term use, Arthritis, COPD (chronic obstructive pulmonary disease), Hypertension, PAD (peripheral artery disease), and PUD (peptic ulcer disease).    Mr. Keita presents today with complaints of burning to both feet that is worse with walking standing.  It has been bothering him for some time.  He does have history of back pain so he is unsure if it is from there for some other reason.      Review of Systems   Neurological:  Positive for paresthesias.   All other systems reviewed and are negative.        Objective:      Physical Exam  Cardiovascular:      Pulses:           Dorsalis pedis pulses are 0 on the right side and 0 on the left side.        Posterior tibial pulses are 0 on the right side and 3+ on the left side.   Musculoskeletal:      Right lower le+ Edema present.      Left lower le+ Edema present.   Feet:      Right foot:      Toenail Condition: Right toenails are abnormally thick. Fungal disease present.     Left foot:      Toenail Condition: Left toenails are abnormally thick. Fungal disease present.     Comments: There is some provocation sign over the tarsal tunnels bilateral.  There is no Tinel sign.    Pes planovalgus bilateral.            Assessment:       Encounter Diagnosis   Name Primary?    Tarsal tunnel syndrome of both lower extremities Yes         Plan:       Bossman was seen today for foot pain.    Diagnoses and all orders for this visit:    Tarsal tunnel syndrome of both lower extremities      I counseled the patient on his conditions, their implications and medical management.    I recommend the patient start with some more supportive shoes in addition to an over-the-counter insert to support the arch to reduce traction of the tibial nerve.    Patient to follow-up as needed.    Ervin Brennan DPM

## 2023-06-14 DIAGNOSIS — M25.571 RIGHT ANKLE PAIN, UNSPECIFIED CHRONICITY: Primary | ICD-10-CM

## 2023-06-16 ENCOUNTER — HOSPITAL ENCOUNTER (OUTPATIENT)
Dept: RADIOLOGY | Facility: HOSPITAL | Age: 68
Discharge: HOME | End: 2023-06-16
Attending: ORTHOPAEDIC SURGERY
Payer: MEDICARE

## 2023-06-16 ENCOUNTER — OFFICE VISIT (OUTPATIENT)
Dept: ORTHOPEDICS | Facility: CLINIC | Age: 68
End: 2023-06-16
Payer: MEDICARE

## 2023-06-16 DIAGNOSIS — M25.571 RIGHT ANKLE PAIN, UNSPECIFIED CHRONICITY: ICD-10-CM

## 2023-06-16 DIAGNOSIS — S82.61XA DISPLACED FRACTURE OF LATERAL MALLEOLUS OF RIGHT FIBULA, INITIAL ENCOUNTER FOR CLOSED FRACTURE: Primary | ICD-10-CM

## 2023-06-16 PROCEDURE — 99203 PR OFFICE/OUTPT VISIT, NEW, LEVL III, 30-44 MIN: ICD-10-PCS | Mod: S$GLB,,, | Performed by: ORTHOPAEDIC SURGERY

## 2023-06-16 PROCEDURE — 1160F PR REVIEW ALL MEDS BY PRESCRIBER/CLIN PHARMACIST DOCUMENTED: ICD-10-PCS | Mod: CPTII,S$GLB,, | Performed by: ORTHOPAEDIC SURGERY

## 2023-06-16 PROCEDURE — 1159F MED LIST DOCD IN RCRD: CPT | Mod: CPTII,S$GLB,, | Performed by: ORTHOPAEDIC SURGERY

## 2023-06-16 PROCEDURE — 73630 X-RAY EXAM OF FOOT: CPT | Mod: 26,RT,, | Performed by: RADIOLOGY

## 2023-06-16 PROCEDURE — 73610 X-RAY EXAM OF ANKLE: CPT | Mod: 26,RT,, | Performed by: RADIOLOGY

## 2023-06-16 PROCEDURE — 99203 OFFICE O/P NEW LOW 30 MIN: CPT | Mod: S$GLB,,, | Performed by: ORTHOPAEDIC SURGERY

## 2023-06-16 PROCEDURE — 99999 PR PBB SHADOW E&M-EST. PATIENT-LVL II: CPT | Mod: PBBFAC,,, | Performed by: ORTHOPAEDIC SURGERY

## 2023-06-16 PROCEDURE — 1125F PR PAIN SEVERITY QUANTIFIED, PAIN PRESENT: ICD-10-PCS | Mod: CPTII,S$GLB,, | Performed by: ORTHOPAEDIC SURGERY

## 2023-06-16 PROCEDURE — 1100F PR PT FALLS ASSESS DOC 2+ FALLS/FALL W/INJURY/YR: ICD-10-PCS | Mod: CPTII,S$GLB,, | Performed by: ORTHOPAEDIC SURGERY

## 2023-06-16 PROCEDURE — 3044F PR MOST RECENT HEMOGLOBIN A1C LEVEL <7.0%: ICD-10-PCS | Mod: CPTII,S$GLB,, | Performed by: ORTHOPAEDIC SURGERY

## 2023-06-16 PROCEDURE — 73630 XR FOOT COMPLETE 3 VIEW RIGHT: ICD-10-PCS | Mod: 26,RT,, | Performed by: RADIOLOGY

## 2023-06-16 PROCEDURE — 4010F ACE/ARB THERAPY RXD/TAKEN: CPT | Mod: CPTII,S$GLB,, | Performed by: ORTHOPAEDIC SURGERY

## 2023-06-16 PROCEDURE — 1125F AMNT PAIN NOTED PAIN PRSNT: CPT | Mod: CPTII,S$GLB,, | Performed by: ORTHOPAEDIC SURGERY

## 2023-06-16 PROCEDURE — 73630 X-RAY EXAM OF FOOT: CPT | Mod: TC,PO,RT

## 2023-06-16 PROCEDURE — 3288F FALL RISK ASSESSMENT DOCD: CPT | Mod: CPTII,S$GLB,, | Performed by: ORTHOPAEDIC SURGERY

## 2023-06-16 PROCEDURE — 3044F HG A1C LEVEL LT 7.0%: CPT | Mod: CPTII,S$GLB,, | Performed by: ORTHOPAEDIC SURGERY

## 2023-06-16 PROCEDURE — 1100F PTFALLS ASSESS-DOCD GE2>/YR: CPT | Mod: CPTII,S$GLB,, | Performed by: ORTHOPAEDIC SURGERY

## 2023-06-16 PROCEDURE — 4010F PR ACE/ARB THEARPY RXD/TAKEN: ICD-10-PCS | Mod: CPTII,S$GLB,, | Performed by: ORTHOPAEDIC SURGERY

## 2023-06-16 PROCEDURE — 1159F PR MEDICATION LIST DOCUMENTED IN MEDICAL RECORD: ICD-10-PCS | Mod: CPTII,S$GLB,, | Performed by: ORTHOPAEDIC SURGERY

## 2023-06-16 PROCEDURE — 73610 XR ANKLE COMPLETE 3 VIEW RIGHT: ICD-10-PCS | Mod: 26,RT,, | Performed by: RADIOLOGY

## 2023-06-16 PROCEDURE — 3288F PR FALLS RISK ASSESSMENT DOCUMENTED: ICD-10-PCS | Mod: CPTII,S$GLB,, | Performed by: ORTHOPAEDIC SURGERY

## 2023-06-16 PROCEDURE — 99999 PR PBB SHADOW E&M-EST. PATIENT-LVL II: ICD-10-PCS | Mod: PBBFAC,,, | Performed by: ORTHOPAEDIC SURGERY

## 2023-06-16 PROCEDURE — 73610 X-RAY EXAM OF ANKLE: CPT | Mod: TC,PO,RT

## 2023-06-16 PROCEDURE — 1160F RVW MEDS BY RX/DR IN RCRD: CPT | Mod: CPTII,S$GLB,, | Performed by: ORTHOPAEDIC SURGERY

## 2023-06-16 NOTE — PROGRESS NOTES
"Status/Diagnosis: Minimally displaced Right lateral malleolus fracture, Pichardo B.  Date of Surgery: none  Date of Injury: 06/10/2023  Return visit: 2 weeks  X-rays on Return: WB 3-views Right ankle    Chief Complaint:   Chief Complaint   Patient presents with    Right Ankle - Pain, Injury     Present History:  Bossman Duke is a 67 y.o. male who presents today for new patient evaluation.  Endorses sustaining a twisting type right ankle injury on 06/10.  Immediate pain and swelling but self or bear weight.  Due to persistent swelling was seen in the ED 2 days after.  X-rays at that time confirmed fracture.  Patient was given crutches with instructions for outpatient orthopedic follow-up.  Denies any pain prior to injury.  Denies any numbness or tingling.  Past medical history significant for left lower extremity peripheral arterial disease status post stent placement on Plavix, hypertension, COPD, chronic tobacco use.      Past Medical History:   Diagnosis Date    Anticoagulant long-term use     Arthritis     COPD (chronic obstructive pulmonary disease)     Hypertension     PAD (peripheral artery disease)     PUD (peptic ulcer disease)        Past Surgical History:   Procedure Laterality Date    APPENDECTOMY      CERVICAL SPINE SURGERY      EPIDURAL STEROID INJECTION INTO LUMBAR SPINE N/A 10/5/2022    Procedure: Injection-steroid-epidural-lumbar L3/4;  Surgeon: Linwood Aponte MD;  Location: [facility] OR;  Service: Pain Management;  Laterality: N/A;    EYE SURGERY Left     "as  a child"    INJECTION OF ANESTHETIC AGENT AROUND MEDIAL BRANCH NERVES INNERVATING LUMBAR FACET JOINT Bilateral 2/9/2021    Procedure: Block-nerve-medial branch-lumbar L3/4, L4/5, L5/S1;  Surgeon: Linwood Aponte MD;  Location: [facility] OR;  Service: Pain Management;  Laterality: Bilateral;    LUMBAR DISC SURGERY      PERCUTANEOUS TRANSLUMINAL ANGIOPLASTY N/A 2/15/2021    Procedure: Pta (Angioplasty, Percutaneous, Transluminal);  Surgeon: Jl TRONCOSO" MD Jolynn;  Location: [facility] CATH;  Service: Cardiology;  Laterality: N/A;    RADIOFREQUENCY ABLATION Right 3/1/2021    Procedure: Radiofrequency Ablation L3/4, L4/5;  Surgeon: Linwood Aponte MD;  Location: [facility] OR;  Service: Pain Management;  Laterality: Right;    RADIOFREQUENCY ABLATION Left 3/10/2021    Procedure: Radiofrequency Ablation L3/4, L4/5;  Surgeon: Linwood Aponte MD;  Location: [facility] OR;  Service: Pain Management;  Laterality: Left;    RADIOFREQUENCY ABLATION OF LUMBAR MEDIAL BRANCH NERVE AT SINGLE LEVEL Bilateral 8/23/2022    Procedure: Radiofrequency Ablation, Nerve, Spinal, Lumbar, Medial Branch, L3/4, L4/5;  Surgeon: Linwood Aponte MD;  Location: [facility] OR;  Service: Pain Management;  Laterality: Bilateral;       Current Outpatient Medications   Medication Sig    aspirin (ECOTRIN) 81 MG EC tablet Take 81 mg by mouth once daily.    clopidogreL (PLAVIX) 75 mg tablet Take one tablet by mouth daily    cyanocobalamin (VITAMIN B-12) 100 MCG tablet Take 100 mcg by mouth once daily.    famotidine (PEPCID) 20 MG tablet Take 20 mg by mouth as needed.    gabapentin (NEURONTIN) 300 MG capsule Take by mouth.    HYDROcodone-acetaminophen (NORCO) 5-325 mg per tablet Take 1 tablet by mouth every 6 (six) hours as needed for Pain.    multivit-mins no.63/iron/folic (M-VIT ORAL) Take 1 tablet by mouth once daily.    naproxen (NAPROSYN) 500 MG tablet Take 1 tablet by mouth 2 times daily with meals.    NIFEdipine (PROCARDIA-XL) 60 MG (OSM) 24 hr tablet TAKE 1 TABLET (60 MG TOTAL) BY MOUTH EVERY EVENING.    tiZANidine (ZANAFLEX) 4 MG tablet Take 1 tablet (4 mg total) by mouth every 8 (eight) hours.    valsartan (DIOVAN) 320 MG tablet TAKE ONE TABLET BY MOUTH DAILY     No current facility-administered medications for this visit.       Review of patient's allergies indicates:   Allergen Reactions    Benicar [olmesartan] Other (See Comments)     Gave him severe fatigue and muscle cramps.     Lisinopril Other (See  Comments)     Fatigue and insomnia    Metoprolol Shortness Of Breath       Family History   Problem Relation Age of Onset    Diabetes Mother     Heart disease Mother     Heart disease Father     Glaucoma Neg Hx     Macular degeneration Neg Hx     Retinal detachment Neg Hx        Social History     Socioeconomic History    Marital status:    Tobacco Use    Smoking status: Former     Packs/day: 0.25     Types: Cigarettes    Smokeless tobacco: Never    Tobacco comments:     Quit about 2 months ago in Feb 2021   Substance and Sexual Activity    Alcohol use: Yes     Alcohol/week: 2.0 standard drinks     Types: 2 Cans of beer per week     Comment: every other week    Drug use: Never       Physical exam:  There were no vitals filed for this visit.  There is no height or weight on file to calculate BMI.  General: In no apparent distress; well developed and well nourished.  HEENT: normocephalic; atraumatic.  Cardiovascular: regular rate.  Respiratory: no increased work of breathing.  Musculoskeletal:   Gait: unable to assess  Inspection:  Moderate persistent lateral based ankle swelling with tenderness on deep palpation of the lateral malleolus.  No pain referable to the foot.  Deferred ankle range of motion secondary to pain.  No medial based ankle swelling or tenderness.  Silfverskiold:  Deferred  Alignment:  Knee: neutral               Ankle: neutral              Hindfoot: neutral              Forefoot: neutral   Strength:              Dorsiflexion 5/5  Plantar flexion 5/5  Inversion 5/5   Eversion 5/5   Sensation:              Monofilament not available for testing  ROM:              Deferred secondary to pain   Pulses: 2+ DP/PT pulses.                   Imaging Studies/Outside documentation:  I have ordered/reviewed/interpreted the following images/outside documentation:  1. NWB 3-views of Right foot and ankle:   On my review there is a minimally displaced long spiral fracture of the lateral malleolus at the  level of the syndesmosis.  Ankle mortise remains congruent.  I did perform a manual external rotation stress view today.  No evidence of clear space widening on this view.  Mild tibiotalar joint space narrowing.  Incidental type 3 accessory navicular.  Otherwise unremarkable.        Assessment:  Bossman Duke is a 67 y.o. male with Minimally displaced Right lateral malleolus fracture, Pichardo B.     Plan:   Clinical and radiographic findings were discussed.  No evidence of clear space widening on manual external rotation stress use.  We will allow patient to begin progressive weight-bearing as tolerated in tall boot.  Discussed rest, ice, compression, elevation.  Patient voiced understanding.  All questions were answered.  Return to clinic in 2 weeks for repeat evaluation and x-ray.  We will re-evaluate monofilament testing at that time.    This note was created using voice recognition software and may contain grammatical errors.

## 2023-06-21 DIAGNOSIS — S82.61XA DISPLACED FRACTURE OF LATERAL MALLEOLUS OF RIGHT FIBULA, INITIAL ENCOUNTER FOR CLOSED FRACTURE: Primary | ICD-10-CM

## 2023-06-29 ENCOUNTER — OFFICE VISIT (OUTPATIENT)
Dept: ORTHOPEDICS | Facility: CLINIC | Age: 68
End: 2023-06-29
Payer: MEDICARE

## 2023-06-29 ENCOUNTER — HOSPITAL ENCOUNTER (OUTPATIENT)
Dept: RADIOLOGY | Facility: HOSPITAL | Age: 68
Discharge: HOME | End: 2023-06-29
Attending: ORTHOPAEDIC SURGERY
Payer: MEDICARE

## 2023-06-29 DIAGNOSIS — S82.61XA DISPLACED FRACTURE OF LATERAL MALLEOLUS OF RIGHT FIBULA, INITIAL ENCOUNTER FOR CLOSED FRACTURE: ICD-10-CM

## 2023-06-29 DIAGNOSIS — S82.61XA DISPLACED FRACTURE OF LATERAL MALLEOLUS OF RIGHT FIBULA, INITIAL ENCOUNTER FOR CLOSED FRACTURE: Primary | ICD-10-CM

## 2023-06-29 PROCEDURE — 1101F PT FALLS ASSESS-DOCD LE1/YR: CPT | Mod: CPTII,S$GLB,, | Performed by: ORTHOPAEDIC SURGERY

## 2023-06-29 PROCEDURE — 99213 PR OFFICE/OUTPT VISIT, EST, LEVL III, 20-29 MIN: ICD-10-PCS | Mod: S$GLB,,, | Performed by: ORTHOPAEDIC SURGERY

## 2023-06-29 PROCEDURE — 3044F HG A1C LEVEL LT 7.0%: CPT | Mod: CPTII,S$GLB,, | Performed by: ORTHOPAEDIC SURGERY

## 2023-06-29 PROCEDURE — 1160F PR REVIEW ALL MEDS BY PRESCRIBER/CLIN PHARMACIST DOCUMENTED: ICD-10-PCS | Mod: CPTII,S$GLB,, | Performed by: ORTHOPAEDIC SURGERY

## 2023-06-29 PROCEDURE — 1101F PR PT FALLS ASSESS DOC 0-1 FALLS W/OUT INJ PAST YR: ICD-10-PCS | Mod: CPTII,S$GLB,, | Performed by: ORTHOPAEDIC SURGERY

## 2023-06-29 PROCEDURE — 1159F MED LIST DOCD IN RCRD: CPT | Mod: CPTII,S$GLB,, | Performed by: ORTHOPAEDIC SURGERY

## 2023-06-29 PROCEDURE — 4010F ACE/ARB THERAPY RXD/TAKEN: CPT | Mod: CPTII,S$GLB,, | Performed by: ORTHOPAEDIC SURGERY

## 2023-06-29 PROCEDURE — 4010F PR ACE/ARB THEARPY RXD/TAKEN: ICD-10-PCS | Mod: CPTII,S$GLB,, | Performed by: ORTHOPAEDIC SURGERY

## 2023-06-29 PROCEDURE — 1126F AMNT PAIN NOTED NONE PRSNT: CPT | Mod: CPTII,S$GLB,, | Performed by: ORTHOPAEDIC SURGERY

## 2023-06-29 PROCEDURE — 1159F PR MEDICATION LIST DOCUMENTED IN MEDICAL RECORD: ICD-10-PCS | Mod: CPTII,S$GLB,, | Performed by: ORTHOPAEDIC SURGERY

## 2023-06-29 PROCEDURE — 1126F PR PAIN SEVERITY QUANTIFIED, NO PAIN PRESENT: ICD-10-PCS | Mod: CPTII,S$GLB,, | Performed by: ORTHOPAEDIC SURGERY

## 2023-06-29 PROCEDURE — 99999 PR PBB SHADOW E&M-EST. PATIENT-LVL II: CPT | Mod: PBBFAC,,, | Performed by: ORTHOPAEDIC SURGERY

## 2023-06-29 PROCEDURE — 73610 X-RAY EXAM OF ANKLE: CPT | Mod: TC,PO,RT

## 2023-06-29 PROCEDURE — 99999 PR PBB SHADOW E&M-EST. PATIENT-LVL II: ICD-10-PCS | Mod: PBBFAC,,, | Performed by: ORTHOPAEDIC SURGERY

## 2023-06-29 PROCEDURE — 1160F RVW MEDS BY RX/DR IN RCRD: CPT | Mod: CPTII,S$GLB,, | Performed by: ORTHOPAEDIC SURGERY

## 2023-06-29 PROCEDURE — 73610 X-RAY EXAM OF ANKLE: CPT | Mod: 26,RT,, | Performed by: RADIOLOGY

## 2023-06-29 PROCEDURE — 3288F FALL RISK ASSESSMENT DOCD: CPT | Mod: CPTII,S$GLB,, | Performed by: ORTHOPAEDIC SURGERY

## 2023-06-29 PROCEDURE — 99213 OFFICE O/P EST LOW 20 MIN: CPT | Mod: S$GLB,,, | Performed by: ORTHOPAEDIC SURGERY

## 2023-06-29 PROCEDURE — 3288F PR FALLS RISK ASSESSMENT DOCUMENTED: ICD-10-PCS | Mod: CPTII,S$GLB,, | Performed by: ORTHOPAEDIC SURGERY

## 2023-06-29 PROCEDURE — 73610 XR ANKLE COMPLETE 3 VIEW RIGHT: ICD-10-PCS | Mod: 26,RT,, | Performed by: RADIOLOGY

## 2023-06-29 PROCEDURE — 3044F PR MOST RECENT HEMOGLOBIN A1C LEVEL <7.0%: ICD-10-PCS | Mod: CPTII,S$GLB,, | Performed by: ORTHOPAEDIC SURGERY

## 2023-06-29 NOTE — PROGRESS NOTES
"Status/Diagnosis: Minimally displaced Right lateral malleolus fracture, Pichardo B.  Date of Surgery: none  Date of Injury: 06/10/2023  Return visit: 4 weeks  X-rays on Return: WB 3-views Right ankle    Chief Complaint:   Right ankle fracture     Present History:  Bossman Duke is a 67 y.o. male who presents today for new patient evaluation.  Endorses sustaining a twisting type right ankle injury on 06/10.  Immediate pain and swelling but self or bear weight.  Due to persistent swelling was seen in the ED 2 days after.  X-rays at that time confirmed fracture.  Patient was given crutches with instructions for outpatient orthopedic follow-up.  Denies any pain prior to injury.  Denies any numbness or tingling.  Past medical history significant for left lower extremity peripheral arterial disease status post stent placement on Plavix, hypertension, COPD, chronic tobacco use.    06/29/2023:  Patient returns today with significant symptomatic relief.  Currently 4/10 pain only when up on his feet for long periods.  He was weight-bearing as tolerated in the tall boot using a walker for ambulation.  Reports he was in the walker full-time as he had increased hip pain with cane use.  No new injuries.  Denies any numbness or tingling.      Past Medical History:   Diagnosis Date    Anticoagulant long-term use     Arthritis     COPD (chronic obstructive pulmonary disease)     Hypertension     PAD (peripheral artery disease)     PUD (peptic ulcer disease)        Past Surgical History:   Procedure Laterality Date    APPENDECTOMY      CERVICAL SPINE SURGERY      EPIDURAL STEROID INJECTION INTO LUMBAR SPINE N/A 10/5/2022    Procedure: Injection-steroid-epidural-lumbar L3/4;  Surgeon: Linwood Aponte MD;  Location: [facility] OR;  Service: Pain Management;  Laterality: N/A;    EYE SURGERY Left     "as  a child"    INJECTION OF ANESTHETIC AGENT AROUND MEDIAL BRANCH NERVES INNERVATING LUMBAR FACET JOINT Bilateral 2/9/2021    Procedure: " Block-nerve-medial branch-lumbar L3/4, L4/5, L5/S1;  Surgeon: Linwood Aponte MD;  Location: [facility] OR;  Service: Pain Management;  Laterality: Bilateral;    LUMBAR DISC SURGERY      PERCUTANEOUS TRANSLUMINAL ANGIOPLASTY N/A 2/15/2021    Procedure: Pta (Angioplasty, Percutaneous, Transluminal);  Surgeon: Jl Neil MD;  Location: [facility] CATH;  Service: Cardiology;  Laterality: N/A;    RADIOFREQUENCY ABLATION Right 3/1/2021    Procedure: Radiofrequency Ablation L3/4, L4/5;  Surgeon: Linwood Aponte MD;  Location: [facility] OR;  Service: Pain Management;  Laterality: Right;    RADIOFREQUENCY ABLATION Left 3/10/2021    Procedure: Radiofrequency Ablation L3/4, L4/5;  Surgeon: Linwood Aponte MD;  Location: [facility] OR;  Service: Pain Management;  Laterality: Left;    RADIOFREQUENCY ABLATION OF LUMBAR MEDIAL BRANCH NERVE AT SINGLE LEVEL Bilateral 8/23/2022    Procedure: Radiofrequency Ablation, Nerve, Spinal, Lumbar, Medial Branch, L3/4, L4/5;  Surgeon: Linwood Aponte MD;  Location: [facility] OR;  Service: Pain Management;  Laterality: Bilateral;       Current Outpatient Medications   Medication Sig    aspirin (ECOTRIN) 81 MG EC tablet Take 81 mg by mouth once daily.    clopidogreL (PLAVIX) 75 mg tablet Take one tablet by mouth daily    cyanocobalamin (VITAMIN B-12) 100 MCG tablet Take 100 mcg by mouth once daily.    famotidine (PEPCID) 20 MG tablet Take 20 mg by mouth as needed.    gabapentin (NEURONTIN) 300 MG capsule Take by mouth.    HYDROcodone-acetaminophen (NORCO) 5-325 mg per tablet Take 1 tablet by mouth every 6 (six) hours as needed for Pain.    multivit-mins no.63/iron/folic (M-VIT ORAL) Take 1 tablet by mouth once daily.    naproxen (NAPROSYN) 500 MG tablet Take 1 tablet by mouth 2 times daily with meals.    NIFEdipine (PROCARDIA-XL) 60 MG (OSM) 24 hr tablet TAKE 1 TABLET (60 MG TOTAL) BY MOUTH EVERY EVENING.    tiZANidine (ZANAFLEX) 4 MG tablet Take 1 tablet (4 mg total) by mouth every 8 (eight) hours.     valsartan (DIOVAN) 320 MG tablet TAKE ONE TABLET BY MOUTH DAILY     No current facility-administered medications for this visit.       Review of patient's allergies indicates:   Allergen Reactions    Benicar [olmesartan] Other (See Comments)     Gave him severe fatigue and muscle cramps.     Lisinopril Other (See Comments)     Fatigue and insomnia    Metoprolol Shortness Of Breath       Family History   Problem Relation Age of Onset    Diabetes Mother     Heart disease Mother     Heart disease Father     Glaucoma Neg Hx     Macular degeneration Neg Hx     Retinal detachment Neg Hx        Social History     Socioeconomic History    Marital status:    Tobacco Use    Smoking status: Former     Packs/day: 0.25     Types: Cigarettes    Smokeless tobacco: Never    Tobacco comments:     Quit about 2 months ago in Feb 2021   Substance and Sexual Activity    Alcohol use: Yes     Alcohol/week: 2.0 standard drinks     Types: 2 Cans of beer per week     Comment: every other week    Drug use: Never       Physical exam:  There were no vitals filed for this visit.  There is no height or weight on file to calculate BMI.  General: In no apparent distress; well developed and well nourished.  HEENT: normocephalic; atraumatic.  Cardiovascular: regular rate.  Respiratory: no increased work of breathing.  Musculoskeletal:   Gait:  Nonantalgic  Inspection:  Mild residual lateral based ankle swelling with tenderness on deep palpation of the lateral malleolus.  No pain referable to the foot.  Ankle range of motion from 5° dorsiflexion to 45° plantar flexion with pain only at extremes.  No medial based ankle swelling or tenderness.  Silfverskiold:  Deferred  Alignment:  Knee: neutral               Ankle: neutral              Hindfoot: neutral              Forefoot: neutral   Strength:              Dorsiflexion 5/5  Plantar flexion 5/5  Inversion 5/5   Eversion 5/5   Sensation:              Monofilament not available for  testing  ROM:              Deferred secondary to pain   Pulses: 2+ DP/PT pulses.                   Imaging Studies/Outside documentation:  I have ordered/reviewed/interpreted the following images/outside documentation:  1. WB 3-views of Right ankle:   On my review there is a persistent minimally displaced long spiral fracture of the lateral malleolus at the level of the syndesmosis.  Ankle mortise remains congruent.  No evidence of interval fracture translation.  Overall alignment well-maintained.         Assessment:  Bossman Duke is a 67 y.o. male with Minimally displaced Right lateral malleolus fracture, Pichardo B.     Plan:   Clinical and radiographic findings were discussed.  Overall alignment well-maintained with weight-bearing stress x-rays today.  Patient may continue to weightbear as tolerated in the tall boot using walker at all times.    We will assess monofilament testing at next clinic visit.      Return to clinic in 4 weeks for repeat evaluation and x-ray.  Pending the above, may begin transitioning out of the boot into normal shoe wear.  Patient voiced understanding.  All questions were answered.        This note was created using voice recognition software and may contain grammatical errors.

## 2023-07-27 ENCOUNTER — TELEPHONE (OUTPATIENT)
Dept: UROLOGY | Facility: CLINIC | Age: 68
End: 2023-07-27
Payer: MEDICARE

## 2023-07-27 NOTE — TELEPHONE ENCOUNTER
----- Message from Louie Green MD sent at 7/27/2023  8:28 AM CDT -----  Referral for prostate nodule  ----- Message -----  From: Mann Amaya MD  Sent: 7/26/2023   3:11 PM CDT  To: Louie Green MD    Left prostate nodule--thanks melissa

## 2023-07-28 DIAGNOSIS — S82.61XA DISPLACED FRACTURE OF LATERAL MALLEOLUS OF RIGHT FIBULA, INITIAL ENCOUNTER FOR CLOSED FRACTURE: Primary | ICD-10-CM

## 2023-08-01 ENCOUNTER — OFFICE VISIT (OUTPATIENT)
Dept: ORTHOPEDICS | Facility: CLINIC | Age: 68
End: 2023-08-01
Payer: MEDICARE

## 2023-08-01 ENCOUNTER — HOSPITAL ENCOUNTER (OUTPATIENT)
Dept: RADIOLOGY | Facility: HOSPITAL | Age: 68
Discharge: HOME | End: 2023-08-01
Attending: ORTHOPAEDIC SURGERY
Payer: MEDICARE

## 2023-08-01 DIAGNOSIS — S82.61XA DISPLACED FRACTURE OF LATERAL MALLEOLUS OF RIGHT FIBULA, INITIAL ENCOUNTER FOR CLOSED FRACTURE: ICD-10-CM

## 2023-08-01 DIAGNOSIS — S82.61XA DISPLACED FRACTURE OF LATERAL MALLEOLUS OF RIGHT FIBULA, INITIAL ENCOUNTER FOR CLOSED FRACTURE: Primary | ICD-10-CM

## 2023-08-01 PROCEDURE — 73610 X-RAY EXAM OF ANKLE: CPT | Mod: 26,HCNC,RT, | Performed by: RADIOLOGY

## 2023-08-01 PROCEDURE — 4010F ACE/ARB THERAPY RXD/TAKEN: CPT | Mod: HCNC,CPTII,S$GLB, | Performed by: ORTHOPAEDIC SURGERY

## 2023-08-01 PROCEDURE — 1126F PR PAIN SEVERITY QUANTIFIED, NO PAIN PRESENT: ICD-10-PCS | Mod: HCNC,CPTII,S$GLB, | Performed by: ORTHOPAEDIC SURGERY

## 2023-08-01 PROCEDURE — 3044F PR MOST RECENT HEMOGLOBIN A1C LEVEL <7.0%: ICD-10-PCS | Mod: HCNC,CPTII,S$GLB, | Performed by: ORTHOPAEDIC SURGERY

## 2023-08-01 PROCEDURE — 1160F PR REVIEW ALL MEDS BY PRESCRIBER/CLIN PHARMACIST DOCUMENTED: ICD-10-PCS | Mod: HCNC,CPTII,S$GLB, | Performed by: ORTHOPAEDIC SURGERY

## 2023-08-01 PROCEDURE — 3288F FALL RISK ASSESSMENT DOCD: CPT | Mod: HCNC,CPTII,S$GLB, | Performed by: ORTHOPAEDIC SURGERY

## 2023-08-01 PROCEDURE — 1126F AMNT PAIN NOTED NONE PRSNT: CPT | Mod: HCNC,CPTII,S$GLB, | Performed by: ORTHOPAEDIC SURGERY

## 2023-08-01 PROCEDURE — 73610 X-RAY EXAM OF ANKLE: CPT | Mod: TC,HCNC,PO,RT

## 2023-08-01 PROCEDURE — 99213 OFFICE O/P EST LOW 20 MIN: CPT | Mod: HCNC,S$GLB,, | Performed by: ORTHOPAEDIC SURGERY

## 2023-08-01 PROCEDURE — 3044F HG A1C LEVEL LT 7.0%: CPT | Mod: HCNC,CPTII,S$GLB, | Performed by: ORTHOPAEDIC SURGERY

## 2023-08-01 PROCEDURE — 99999 PR PBB SHADOW E&M-EST. PATIENT-LVL II: ICD-10-PCS | Mod: PBBFAC,HCNC,, | Performed by: ORTHOPAEDIC SURGERY

## 2023-08-01 PROCEDURE — 1159F PR MEDICATION LIST DOCUMENTED IN MEDICAL RECORD: ICD-10-PCS | Mod: HCNC,CPTII,S$GLB, | Performed by: ORTHOPAEDIC SURGERY

## 2023-08-01 PROCEDURE — 1159F MED LIST DOCD IN RCRD: CPT | Mod: HCNC,CPTII,S$GLB, | Performed by: ORTHOPAEDIC SURGERY

## 2023-08-01 PROCEDURE — 99213 PR OFFICE/OUTPT VISIT, EST, LEVL III, 20-29 MIN: ICD-10-PCS | Mod: HCNC,S$GLB,, | Performed by: ORTHOPAEDIC SURGERY

## 2023-08-01 PROCEDURE — 4010F PR ACE/ARB THEARPY RXD/TAKEN: ICD-10-PCS | Mod: HCNC,CPTII,S$GLB, | Performed by: ORTHOPAEDIC SURGERY

## 2023-08-01 PROCEDURE — 1101F PT FALLS ASSESS-DOCD LE1/YR: CPT | Mod: HCNC,CPTII,S$GLB, | Performed by: ORTHOPAEDIC SURGERY

## 2023-08-01 PROCEDURE — 1101F PR PT FALLS ASSESS DOC 0-1 FALLS W/OUT INJ PAST YR: ICD-10-PCS | Mod: HCNC,CPTII,S$GLB, | Performed by: ORTHOPAEDIC SURGERY

## 2023-08-01 PROCEDURE — 1160F RVW MEDS BY RX/DR IN RCRD: CPT | Mod: HCNC,CPTII,S$GLB, | Performed by: ORTHOPAEDIC SURGERY

## 2023-08-01 PROCEDURE — 3288F PR FALLS RISK ASSESSMENT DOCUMENTED: ICD-10-PCS | Mod: HCNC,CPTII,S$GLB, | Performed by: ORTHOPAEDIC SURGERY

## 2023-08-01 PROCEDURE — 73610 XR ANKLE COMPLETE 3 VIEW RIGHT: ICD-10-PCS | Mod: 26,HCNC,RT, | Performed by: RADIOLOGY

## 2023-08-01 PROCEDURE — 99999 PR PBB SHADOW E&M-EST. PATIENT-LVL II: CPT | Mod: PBBFAC,HCNC,, | Performed by: ORTHOPAEDIC SURGERY

## 2023-08-01 NOTE — PROGRESS NOTES
Status/Diagnosis: Minimally displaced Right lateral malleolus fracture, Pichardo B.  Date of Surgery: none  Date of Injury: 06/10/2023  Return visit: 6 weeks  X-rays on Return: WB 3-views Right ankle    Chief Complaint:   Right ankle fracture     Present History:  Bossman Duke is a 68 y.o. male who presents today for new patient evaluation.  Endorses sustaining a twisting type right ankle injury on 06/10.  Immediate pain and swelling but self or bear weight.  Due to persistent swelling was seen in the ED 2 days after.  X-rays at that time confirmed fracture.  Patient was given crutches with instructions for outpatient orthopedic follow-up.  Denies any pain prior to injury.  Denies any numbness or tingling.  Past medical history significant for left lower extremity peripheral arterial disease status post stent placement on Plavix, hypertension, COPD, chronic tobacco use.    06/29/2023:  Patient returns today with significant symptomatic relief.  Currently 4/10 pain only when up on his feet for long periods.  He was weight-bearing as tolerated in the tall boot using a walker for ambulation.  Reports he was in the walker full-time as he had increased hip pain with cane use.  No new injuries.  Denies any numbness or tingling.    08/01/2023:  Patient returns today for routine follow-up.  Currently weight-bearing as tolerated in normal shoe wear using a cane for ambulation.  Reports he self discontinued the tall boot approximately 1 week after his last visit.  Overall doing well.  0/10 pain.  Denies any new injuries.  Denies any numbness or tingling.      Past Medical History:   Diagnosis Date    Anticoagulant long-term use     Arthritis     COPD (chronic obstructive pulmonary disease)     Hypertension     PAD (peripheral artery disease)     PUD (peptic ulcer disease)        Past Surgical History:   Procedure Laterality Date    APPENDECTOMY      CERVICAL SPINE SURGERY      EPIDURAL STEROID INJECTION INTO LUMBAR SPINE N/A  "10/5/2022    Procedure: Injection-steroid-epidural-lumbar L3/4;  Surgeon: Linwood Aponte MD;  Location: [facility] OR;  Service: Pain Management;  Laterality: N/A;    EYE SURGERY Left     "as  a child"    INJECTION OF ANESTHETIC AGENT AROUND MEDIAL BRANCH NERVES INNERVATING LUMBAR FACET JOINT Bilateral 2/9/2021    Procedure: Block-nerve-medial branch-lumbar L3/4, L4/5, L5/S1;  Surgeon: Linwood Aponte MD;  Location: [facility] OR;  Service: Pain Management;  Laterality: Bilateral;    LUMBAR DISC SURGERY      PERCUTANEOUS TRANSLUMINAL ANGIOPLASTY N/A 2/15/2021    Procedure: Pta (Angioplasty, Percutaneous, Transluminal);  Surgeon: Jl Neil MD;  Location: [facility] CATH;  Service: Cardiology;  Laterality: N/A;    RADIOFREQUENCY ABLATION Right 3/1/2021    Procedure: Radiofrequency Ablation L3/4, L4/5;  Surgeon: Linwood Aponte MD;  Location: [facility] OR;  Service: Pain Management;  Laterality: Right;    RADIOFREQUENCY ABLATION Left 3/10/2021    Procedure: Radiofrequency Ablation L3/4, L4/5;  Surgeon: Linwood Aponte MD;  Location: [facility] OR;  Service: Pain Management;  Laterality: Left;    RADIOFREQUENCY ABLATION OF LUMBAR MEDIAL BRANCH NERVE AT SINGLE LEVEL Bilateral 8/23/2022    Procedure: Radiofrequency Ablation, Nerve, Spinal, Lumbar, Medial Branch, L3/4, L4/5;  Surgeon: Linwood Apotne MD;  Location: [facility] OR;  Service: Pain Management;  Laterality: Bilateral;       Current Outpatient Medications   Medication Sig    amLODIPine (NORVASC) 5 MG tablet Take 1 tablet (5 mg total) by mouth every evening.    aspirin (ECOTRIN) 81 MG EC tablet Take 81 mg by mouth once daily.    atorvastatin (LIPITOR) 20 MG tablet Take 1 tablet (20 mg total) by mouth once daily.    clopidogreL (PLAVIX) 75 mg tablet Take one tablet by mouth daily    glycopyrrolate-formoteroL (BEVESPI AEROSPHERE) 9-4.8 mcg HFAA Inhale 1 puff into the lungs once daily. Controller    umeclidinium-vilanteroL (ANORO ELLIPTA) 62.5-25 mcg/actuation DsDv Inhale 1 puff into " the lungs once daily. Controller    UNABLE TO FIND medication name: balance of nature vitamin    valsartan (DIOVAN) 320 MG tablet TAKE ONE TABLET BY MOUTH DAILY     No current facility-administered medications for this visit.       Review of patient's allergies indicates:   Allergen Reactions    Benicar [olmesartan] Other (See Comments)     Gave him severe fatigue and muscle cramps.     Lisinopril Other (See Comments)     Fatigue and insomnia    Metoprolol Shortness Of Breath       Family History   Problem Relation Age of Onset    Diabetes Mother     Heart disease Mother     Heart disease Father     Glaucoma Neg Hx     Macular degeneration Neg Hx     Retinal detachment Neg Hx        Social History     Socioeconomic History    Marital status:    Tobacco Use    Smoking status: Every Day     Current packs/day: 0.50     Types: Cigarettes    Smokeless tobacco: Never    Tobacco comments:     Quit about 2 months ago in Feb 2021   Substance and Sexual Activity    Alcohol use: Yes     Alcohol/week: 2.0 standard drinks of alcohol     Types: 2 Cans of beer per week     Comment: every other week    Drug use: Never       Physical exam:  There were no vitals filed for this visit.  There is no height or weight on file to calculate BMI.  General: In no apparent distress; well developed and well nourished.  HEENT: normocephalic; atraumatic.  Cardiovascular: regular rate.  Respiratory: no increased work of breathing.  Musculoskeletal:   Gait:  Nonantalgic  Inspection:  Mild residual lateral based ankle swelling with minimal tenderness on deep palpation of the lateral malleolus.  No pain referable to the foot.  Ankle range of motion from 5° dorsiflexion to 45° plantar flexion with pain only at extremes.  No medial based ankle swelling or tenderness.  Silfverskiold:  Deferred  Alignment:  Knee: neutral               Ankle: neutral              Hindfoot: neutral              Forefoot: neutral   Strength:              Dorsiflexion  5/5  Plantar flexion 5/5  Inversion 5/5   Eversion 5/5   Sensation:              Monofilament not available for testing  ROM:              Deferred secondary to pain   Pulses: 2+ DP/PT pulses.                   Imaging Studies/Outside documentation:  I have ordered/reviewed/interpreted the following images/outside documentation:  1. WB 3-views of Right ankle:   On my independent review there is a persistent minimally displaced long spiral fracture of the lateral malleolus at the level of the syndesmosis.  Ankle mortise remains congruent. No evidence of interval fracture translation.  Early interval callus formation and bony bridging are present Overall alignment well-maintained.         Assessment:  Bossman Duke is a 68 y.o. male with Minimally displaced Right lateral malleolus fracture, Pichardo B.     Plan:   Clinical and radiographic findings were discussed.  Vicente noncompliance in regard to boot wear as noted in HPI.  Patient does not need continued extrinsic support thus we will fit him for a lace-up ankle brace today.  May continue to weightbear as tolerated using an assistive ambulatory device whether that be a cane or walker.  No high impact activities.  Patient voiced understanding.  All questions were answered.  Return to clinic in 6 weeks for repeat evaluation and x-ray.   We will assess monofilament testing at next clinic visit.        This note was created using voice recognition software and may contain grammatical errors.

## 2023-08-14 ENCOUNTER — PES CALL (OUTPATIENT)
Dept: ADMINISTRATIVE | Facility: CLINIC | Age: 68
End: 2023-08-14
Payer: MEDICARE

## 2023-08-22 ENCOUNTER — OFFICE VISIT (OUTPATIENT)
Dept: UROLOGY | Facility: CLINIC | Age: 68
End: 2023-08-22
Payer: MEDICARE

## 2023-08-22 VITALS — BODY MASS INDEX: 43.63 KG/M2 | WEIGHT: 294.56 LBS | HEIGHT: 69 IN

## 2023-08-22 DIAGNOSIS — N40.2 PROSTATE NODULE: ICD-10-CM

## 2023-08-22 PROCEDURE — 3044F PR MOST RECENT HEMOGLOBIN A1C LEVEL <7.0%: ICD-10-PCS | Mod: HCNC,CPTII,S$GLB, | Performed by: STUDENT IN AN ORGANIZED HEALTH CARE EDUCATION/TRAINING PROGRAM

## 2023-08-22 PROCEDURE — 1159F PR MEDICATION LIST DOCUMENTED IN MEDICAL RECORD: ICD-10-PCS | Mod: HCNC,CPTII,S$GLB, | Performed by: STUDENT IN AN ORGANIZED HEALTH CARE EDUCATION/TRAINING PROGRAM

## 2023-08-22 PROCEDURE — 99999 PR PBB SHADOW E&M-EST. PATIENT-LVL III: CPT | Mod: PBBFAC,HCNC,, | Performed by: STUDENT IN AN ORGANIZED HEALTH CARE EDUCATION/TRAINING PROGRAM

## 2023-08-22 PROCEDURE — 99203 PR OFFICE/OUTPT VISIT, NEW, LEVL III, 30-44 MIN: ICD-10-PCS | Mod: HCNC,S$GLB,, | Performed by: STUDENT IN AN ORGANIZED HEALTH CARE EDUCATION/TRAINING PROGRAM

## 2023-08-22 PROCEDURE — 4010F ACE/ARB THERAPY RXD/TAKEN: CPT | Mod: HCNC,CPTII,S$GLB, | Performed by: STUDENT IN AN ORGANIZED HEALTH CARE EDUCATION/TRAINING PROGRAM

## 2023-08-22 PROCEDURE — 3008F PR BODY MASS INDEX (BMI) DOCUMENTED: ICD-10-PCS | Mod: HCNC,CPTII,S$GLB, | Performed by: STUDENT IN AN ORGANIZED HEALTH CARE EDUCATION/TRAINING PROGRAM

## 2023-08-22 PROCEDURE — 99999 PR PBB SHADOW E&M-EST. PATIENT-LVL III: ICD-10-PCS | Mod: PBBFAC,HCNC,, | Performed by: STUDENT IN AN ORGANIZED HEALTH CARE EDUCATION/TRAINING PROGRAM

## 2023-08-22 PROCEDURE — 3288F FALL RISK ASSESSMENT DOCD: CPT | Mod: HCNC,CPTII,S$GLB, | Performed by: STUDENT IN AN ORGANIZED HEALTH CARE EDUCATION/TRAINING PROGRAM

## 2023-08-22 PROCEDURE — 1159F MED LIST DOCD IN RCRD: CPT | Mod: HCNC,CPTII,S$GLB, | Performed by: STUDENT IN AN ORGANIZED HEALTH CARE EDUCATION/TRAINING PROGRAM

## 2023-08-22 PROCEDURE — 1160F RVW MEDS BY RX/DR IN RCRD: CPT | Mod: HCNC,CPTII,S$GLB, | Performed by: STUDENT IN AN ORGANIZED HEALTH CARE EDUCATION/TRAINING PROGRAM

## 2023-08-22 PROCEDURE — 99203 OFFICE O/P NEW LOW 30 MIN: CPT | Mod: HCNC,S$GLB,, | Performed by: STUDENT IN AN ORGANIZED HEALTH CARE EDUCATION/TRAINING PROGRAM

## 2023-08-22 PROCEDURE — 1160F PR REVIEW ALL MEDS BY PRESCRIBER/CLIN PHARMACIST DOCUMENTED: ICD-10-PCS | Mod: HCNC,CPTII,S$GLB, | Performed by: STUDENT IN AN ORGANIZED HEALTH CARE EDUCATION/TRAINING PROGRAM

## 2023-08-22 PROCEDURE — 3008F BODY MASS INDEX DOCD: CPT | Mod: HCNC,CPTII,S$GLB, | Performed by: STUDENT IN AN ORGANIZED HEALTH CARE EDUCATION/TRAINING PROGRAM

## 2023-08-22 PROCEDURE — 3288F PR FALLS RISK ASSESSMENT DOCUMENTED: ICD-10-PCS | Mod: HCNC,CPTII,S$GLB, | Performed by: STUDENT IN AN ORGANIZED HEALTH CARE EDUCATION/TRAINING PROGRAM

## 2023-08-22 PROCEDURE — 4010F PR ACE/ARB THEARPY RXD/TAKEN: ICD-10-PCS | Mod: HCNC,CPTII,S$GLB, | Performed by: STUDENT IN AN ORGANIZED HEALTH CARE EDUCATION/TRAINING PROGRAM

## 2023-08-22 PROCEDURE — 3044F HG A1C LEVEL LT 7.0%: CPT | Mod: HCNC,CPTII,S$GLB, | Performed by: STUDENT IN AN ORGANIZED HEALTH CARE EDUCATION/TRAINING PROGRAM

## 2023-08-22 PROCEDURE — 1101F PR PT FALLS ASSESS DOC 0-1 FALLS W/OUT INJ PAST YR: ICD-10-PCS | Mod: HCNC,CPTII,S$GLB, | Performed by: STUDENT IN AN ORGANIZED HEALTH CARE EDUCATION/TRAINING PROGRAM

## 2023-08-22 PROCEDURE — 1101F PT FALLS ASSESS-DOCD LE1/YR: CPT | Mod: HCNC,CPTII,S$GLB, | Performed by: STUDENT IN AN ORGANIZED HEALTH CARE EDUCATION/TRAINING PROGRAM

## 2023-08-22 NOTE — PROGRESS NOTES
"[place] - Urology   Clinic Note    Subjective:     Chief Complaint: Other (Prostate Nodule)      History of Present Illness:  Bossman Duke is a 68 y.o. male who presents to clinic for evaluation and management of abnormal prostate exam. He is new to our clinic referred by Dr. Mann Amaya    He reports a swollen left side of his prostate on recent SARA. He denies a family history of prostate cancer. He has no urinary complaints.  Denies dysuria or hematuria.    PSA trend reviewed and listed below  Component PSA, Screen   Latest Ref Rng & Units 0.00 - 4.00 ng/mL   7/26/2023 2.5   3/3/2022 2.9   7/9/2020 2.1       Past medical, family, surgical and social history reviewed as documented in chart with pertinent positive medical, family, surgical and social history detailed in HPI.    A review systems was conducted with pertinent positive and negative findings documented in HPI.    Anticoagulation/Antiplatelets:  Yes plavix    Objective:     Estimated body mass index is 43.5 kg/m² as calculated from the following:    Height as of this encounter: 5' 9" (1.753 m).    Weight as of this encounter: 133.6 kg (294 lb 8.6 oz).    Vital Signs (Most Recent)       Physical Exam  Constitutional:       General: He is not in acute distress.     Appearance: He is not ill-appearing or toxic-appearing.   Pulmonary:      Effort: Pulmonary effort is normal. No accessory muscle usage or respiratory distress.   Genitourinary:     Comments: Prostate 35 g, left sided asymmetry but smooth without nodularity.   Neurological:      Mental Status: He is alert.         Labs reviewed below:  Lab Results   Component Value Date    BUN 11 03/15/2023    CREATININE 0.9 03/15/2023    WBC 11.19 03/15/2023    HGB 14.8 03/15/2023    HCT 45.6 03/15/2023     03/15/2023    AST 21 03/15/2023    ALT 32 03/15/2023    ALKPHOS 93 03/15/2023    ALBUMIN 3.9 03/15/2023    HGBA1C 6.1 (H) 03/15/2023        PSA trend reviewed below:  Lab Results   Component " Value Date    PSA 2.5 07/26/2023    PSA 2.9 03/03/2022    PSA 2.1 07/09/2020      Assessment:     1. Prostate nodule      Plan:     Asymmetric enlargement on exam without firm nodularity or mass.  I discussed the findings with the patient.  Along with this and his PSA trend we discussed options including further PSA screening, further evaluation with MRI of the prostate prior to or in addition to prostate biopsy.  We discussed the prostate biopsy and the risks associated with this as well.    He is not interested in further evaluation at this time.    He would prefer to continue PSA screening.    Louie Green MD

## 2023-09-05 PROBLEM — U07.1 COVID: Status: ACTIVE | Noted: 2023-09-05

## 2023-09-05 PROBLEM — Z71.89 ADVANCE CARE PLANNING: Status: ACTIVE | Noted: 2023-09-05

## 2023-09-06 PROBLEM — R06.02 SOB (SHORTNESS OF BREATH): Status: RESOLVED | Noted: 2020-11-10 | Resolved: 2023-09-06

## 2023-09-06 PROBLEM — J30.2 SEASONAL ALLERGIES: Status: RESOLVED | Noted: 2020-07-09 | Resolved: 2023-09-06

## 2023-09-06 PROBLEM — R06.03 ACUTE RESPIRATORY DISTRESS: Status: ACTIVE | Noted: 2023-09-06

## 2023-09-06 PROBLEM — M47.816 LUMBAR SPONDYLOSIS: Status: RESOLVED | Noted: 2021-03-01 | Resolved: 2023-09-06

## 2023-09-06 PROBLEM — I48.0 PAROXYSMAL ATRIAL FIBRILLATION: Status: ACTIVE | Noted: 2023-09-06

## 2023-09-06 PROBLEM — M54.42 LEFT-SIDED LOW BACK PAIN WITH LEFT-SIDED SCIATICA: Status: RESOLVED | Noted: 2022-05-03 | Resolved: 2023-09-06

## 2023-09-06 PROBLEM — I10 ACCELERATED HYPERTENSION: Status: ACTIVE | Noted: 2023-09-06

## 2023-09-21 DIAGNOSIS — M25.571 RIGHT ANKLE PAIN, UNSPECIFIED CHRONICITY: Primary | ICD-10-CM

## 2023-09-22 ENCOUNTER — HOSPITAL ENCOUNTER (OUTPATIENT)
Dept: RADIOLOGY | Facility: HOSPITAL | Age: 68
Discharge: HOME | End: 2023-09-22
Attending: ORTHOPAEDIC SURGERY
Payer: MEDICARE

## 2023-09-22 ENCOUNTER — OFFICE VISIT (OUTPATIENT)
Dept: ORTHOPEDICS | Facility: CLINIC | Age: 68
End: 2023-09-22
Payer: MEDICARE

## 2023-09-22 DIAGNOSIS — M25.571 RIGHT ANKLE PAIN, UNSPECIFIED CHRONICITY: ICD-10-CM

## 2023-09-22 DIAGNOSIS — S82.61XA DISPLACED FRACTURE OF LATERAL MALLEOLUS OF RIGHT FIBULA, INITIAL ENCOUNTER FOR CLOSED FRACTURE: Primary | ICD-10-CM

## 2023-09-22 PROCEDURE — 1111F DSCHRG MED/CURRENT MED MERGE: CPT | Mod: CPTII,S$GLB,, | Performed by: ORTHOPAEDIC SURGERY

## 2023-09-22 PROCEDURE — 99213 OFFICE O/P EST LOW 20 MIN: CPT | Mod: S$GLB,,, | Performed by: ORTHOPAEDIC SURGERY

## 2023-09-22 PROCEDURE — 1160F RVW MEDS BY RX/DR IN RCRD: CPT | Mod: CPTII,S$GLB,, | Performed by: ORTHOPAEDIC SURGERY

## 2023-09-22 PROCEDURE — 1111F PR DISCHARGE MEDS RECONCILED W/ CURRENT OUTPATIENT MED LIST: ICD-10-PCS | Mod: CPTII,S$GLB,, | Performed by: ORTHOPAEDIC SURGERY

## 2023-09-22 PROCEDURE — 3044F PR MOST RECENT HEMOGLOBIN A1C LEVEL <7.0%: ICD-10-PCS | Mod: CPTII,S$GLB,, | Performed by: ORTHOPAEDIC SURGERY

## 2023-09-22 PROCEDURE — 73610 XR ANKLE COMPLETE 3 VIEW RIGHT: ICD-10-PCS | Mod: 26,HCNC,RT, | Performed by: RADIOLOGY

## 2023-09-22 PROCEDURE — 4010F ACE/ARB THERAPY RXD/TAKEN: CPT | Mod: CPTII,S$GLB,, | Performed by: ORTHOPAEDIC SURGERY

## 2023-09-22 PROCEDURE — 1125F AMNT PAIN NOTED PAIN PRSNT: CPT | Mod: CPTII,S$GLB,, | Performed by: ORTHOPAEDIC SURGERY

## 2023-09-22 PROCEDURE — 4010F PR ACE/ARB THEARPY RXD/TAKEN: ICD-10-PCS | Mod: CPTII,S$GLB,, | Performed by: ORTHOPAEDIC SURGERY

## 2023-09-22 PROCEDURE — 1159F PR MEDICATION LIST DOCUMENTED IN MEDICAL RECORD: ICD-10-PCS | Mod: CPTII,S$GLB,, | Performed by: ORTHOPAEDIC SURGERY

## 2023-09-22 PROCEDURE — 1159F MED LIST DOCD IN RCRD: CPT | Mod: CPTII,S$GLB,, | Performed by: ORTHOPAEDIC SURGERY

## 2023-09-22 PROCEDURE — 73610 X-RAY EXAM OF ANKLE: CPT | Mod: 26,HCNC,RT, | Performed by: RADIOLOGY

## 2023-09-22 PROCEDURE — 99213 PR OFFICE/OUTPT VISIT, EST, LEVL III, 20-29 MIN: ICD-10-PCS | Mod: S$GLB,,, | Performed by: ORTHOPAEDIC SURGERY

## 2023-09-22 PROCEDURE — 1125F PR PAIN SEVERITY QUANTIFIED, PAIN PRESENT: ICD-10-PCS | Mod: CPTII,S$GLB,, | Performed by: ORTHOPAEDIC SURGERY

## 2023-09-22 PROCEDURE — 73610 X-RAY EXAM OF ANKLE: CPT | Mod: TC,HCNC,PO,RT

## 2023-09-22 PROCEDURE — 1160F PR REVIEW ALL MEDS BY PRESCRIBER/CLIN PHARMACIST DOCUMENTED: ICD-10-PCS | Mod: CPTII,S$GLB,, | Performed by: ORTHOPAEDIC SURGERY

## 2023-09-22 PROCEDURE — 3044F HG A1C LEVEL LT 7.0%: CPT | Mod: CPTII,S$GLB,, | Performed by: ORTHOPAEDIC SURGERY

## 2023-09-29 ENCOUNTER — OUTPATIENT CASE MANAGEMENT (OUTPATIENT)
Dept: ADMINISTRATIVE | Facility: OTHER | Age: 68
End: 2023-09-29
Payer: MEDICARE

## 2023-09-29 NOTE — PROGRESS NOTES
09/29/23 - Patient was provided [facility] program information and benefits along with referral sent by Dr. Vargas for Afib with rvr; patient declined at this time as he feels his needs are met.  He was encouraged to reach out to [facility] RN if he changes his decision. Case closure.

## 2023-10-04 ENCOUNTER — OFFICE VISIT (OUTPATIENT)
Dept: CARDIOLOGY | Facility: CLINIC | Age: 68
End: 2023-10-04
Payer: MEDICARE

## 2023-10-04 VITALS
SYSTOLIC BLOOD PRESSURE: 168 MMHG | BODY MASS INDEX: 42.87 KG/M2 | HEART RATE: 92 BPM | DIASTOLIC BLOOD PRESSURE: 89 MMHG | HEIGHT: 69 IN | WEIGHT: 289.44 LBS

## 2023-10-04 DIAGNOSIS — I73.9 PVD (PERIPHERAL VASCULAR DISEASE): ICD-10-CM

## 2023-10-04 DIAGNOSIS — I48.0 PAROXYSMAL ATRIAL FIBRILLATION: ICD-10-CM

## 2023-10-04 DIAGNOSIS — J44.9 CHRONIC OBSTRUCTIVE PULMONARY DISEASE, UNSPECIFIED COPD TYPE: ICD-10-CM

## 2023-10-04 DIAGNOSIS — I10 ESSENTIAL HYPERTENSION: ICD-10-CM

## 2023-10-04 PROCEDURE — 4010F PR ACE/ARB THEARPY RXD/TAKEN: ICD-10-PCS | Mod: HCNC,CPTII,S$GLB,

## 2023-10-04 PROCEDURE — 3044F HG A1C LEVEL LT 7.0%: CPT | Mod: HCNC,CPTII,S$GLB,

## 2023-10-04 PROCEDURE — 3079F PR MOST RECENT DIASTOLIC BLOOD PRESSURE 80-89 MM HG: ICD-10-PCS | Mod: HCNC,CPTII,S$GLB,

## 2023-10-04 PROCEDURE — 1111F PR DISCHARGE MEDS RECONCILED W/ CURRENT OUTPATIENT MED LIST: ICD-10-PCS | Mod: HCNC,CPTII,S$GLB,

## 2023-10-04 PROCEDURE — 99214 PR OFFICE/OUTPT VISIT, EST, LEVL IV, 30-39 MIN: ICD-10-PCS | Mod: HCNC,S$GLB,,

## 2023-10-04 PROCEDURE — 99214 OFFICE O/P EST MOD 30 MIN: CPT | Mod: HCNC,S$GLB,,

## 2023-10-04 PROCEDURE — 3288F FALL RISK ASSESSMENT DOCD: CPT | Mod: HCNC,CPTII,S$GLB,

## 2023-10-04 PROCEDURE — 3077F SYST BP >= 140 MM HG: CPT | Mod: HCNC,CPTII,S$GLB,

## 2023-10-04 PROCEDURE — 3079F DIAST BP 80-89 MM HG: CPT | Mod: HCNC,CPTII,S$GLB,

## 2023-10-04 PROCEDURE — 1125F PR PAIN SEVERITY QUANTIFIED, PAIN PRESENT: ICD-10-PCS | Mod: HCNC,CPTII,S$GLB,

## 2023-10-04 PROCEDURE — 3008F PR BODY MASS INDEX (BMI) DOCUMENTED: ICD-10-PCS | Mod: HCNC,CPTII,S$GLB,

## 2023-10-04 PROCEDURE — 1100F PTFALLS ASSESS-DOCD GE2>/YR: CPT | Mod: HCNC,CPTII,S$GLB,

## 2023-10-04 PROCEDURE — 3008F BODY MASS INDEX DOCD: CPT | Mod: HCNC,CPTII,S$GLB,

## 2023-10-04 PROCEDURE — 3288F PR FALLS RISK ASSESSMENT DOCUMENTED: ICD-10-PCS | Mod: HCNC,CPTII,S$GLB,

## 2023-10-04 PROCEDURE — 1159F PR MEDICATION LIST DOCUMENTED IN MEDICAL RECORD: ICD-10-PCS | Mod: HCNC,CPTII,S$GLB,

## 2023-10-04 PROCEDURE — 1111F DSCHRG MED/CURRENT MED MERGE: CPT | Mod: HCNC,CPTII,S$GLB,

## 2023-10-04 PROCEDURE — 4010F ACE/ARB THERAPY RXD/TAKEN: CPT | Mod: HCNC,CPTII,S$GLB,

## 2023-10-04 PROCEDURE — 1159F MED LIST DOCD IN RCRD: CPT | Mod: HCNC,CPTII,S$GLB,

## 2023-10-04 PROCEDURE — 3077F PR MOST RECENT SYSTOLIC BLOOD PRESSURE >= 140 MM HG: ICD-10-PCS | Mod: HCNC,CPTII,S$GLB,

## 2023-10-04 PROCEDURE — 1100F PR PT FALLS ASSESS DOC 2+ FALLS/FALL W/INJURY/YR: ICD-10-PCS | Mod: HCNC,CPTII,S$GLB,

## 2023-10-04 PROCEDURE — 3044F PR MOST RECENT HEMOGLOBIN A1C LEVEL <7.0%: ICD-10-PCS | Mod: HCNC,CPTII,S$GLB,

## 2023-10-04 PROCEDURE — 1125F AMNT PAIN NOTED PAIN PRSNT: CPT | Mod: HCNC,CPTII,S$GLB,

## 2023-10-04 PROCEDURE — 99999 PR PBB SHADOW E&M-EST. PATIENT-LVL III: CPT | Mod: PBBFAC,HCNC,,

## 2023-10-04 PROCEDURE — 99999 PR PBB SHADOW E&M-EST. PATIENT-LVL III: ICD-10-PCS | Mod: PBBFAC,HCNC,,

## 2023-10-04 RX ORDER — HYDROCHLOROTHIAZIDE 25 MG/1
25 TABLET ORAL DAILY
Qty: 90 TABLET | Refills: 1 | Status: SHIPPED | OUTPATIENT
Start: 2023-08-29 | End: 2024-03-11

## 2023-10-04 NOTE — ASSESSMENT & PLAN NOTE
Rate controlled today   Continue eliquis 5 mg BID -- no bleeding tendencies noted   Continue cardizem 240 mg daily   Holter to assess burden at some point but relatively asymptomatic

## 2023-10-04 NOTE — ASSESSMENT & PLAN NOTE
Elevated   Increase HCTZ to 25 mg daily   Continue valsartan 320 mg daily   Continue cardziem 240 mg daily   Low Na diet

## 2023-10-04 NOTE — PROGRESS NOTES
Subjective:    Patient ID:  Bossman Duke is a 68 y.o. male patient here for evaluation Follow-up    History of Present Illness:     Mr. Duke is a 68 year old M who follows with Dr. Neil here today for a hospital follow up, as below. He is doing much better but continues with some chronic LAWSON. Has not yet seen pulmonology in follow up. He seems to continue to be going in and out of atrial fibrillation but is asymptomatic. He is complaint with all his medications, no bleeding tendencies. BP has been elevated since his discharge and he does notice continual fluid build up in his LE. No chest pain worsening claudication palpitations dizziness syncope.     Hospital Course:   Acute respiratory failure multifactorial with known COPD not on home oxygen, COVID test + 9/4/2023, a fib RVR with initial focus on cardiac management with cardizem infusion with transition to oral cardizem, echo ef preserved. Initiation of eliquis, CHADVAS score 3. Persistent shortness of breath after rate control, improved with diuresis.Pulmonary consultation for COVID 19 input with remdesivir started. Echo with ef preserved normal diastolic function.  Much improved to baseline with remdesivir course completed.       Most Recent Echocardiogram Results  Results for orders placed during the hospital encounter of 09/04/23    Echo    Interpretation Summary    Left Ventricle: The left ventricle is normal in size. Normal wall motion. There is normal systolic function with a visually estimated ejection fraction of 55 - 60%. There is normal diastolic function.    Right Ventricle: Normal right ventricular cavity size. Systolic function is normal.    IVC/SVC: Normal venous pressure at 3 mmHg.      Most Recent Nuclear Stress Test Results  Results for orders placed during the hospital encounter of 12/09/20    Nuclear Stress - Cardiology Interpreted    Interpretation Summary    Normal myocardial perfusion scan. There is no evidence of myocardial  ischemia or infarction.    There is a  mild intensity fixed defect in the inferior wall of the left ventricle secondary to diaphragm attenuation.    Gated perfusion images showed an ejection fraction of 60%    The EKG portion of this study is negative for ischemia.    Arrhythmias during stress: PVCs.    There are no prior studies for comparison.      Most Recent Cardiac PET Stress Test Results  No results found for this or any previous visit.      Most Recent Cardiovascular Angiogram results  Results for orders placed during the hospital encounter of 02/15/21    Cardiac catheterization    Conclusion  · Significant PAD as described in the text  · Successful PTA and stenting of the lesion in the proximal to mid SFA    The procedure log was documented by No documenter listed and verified by Jl Neil MD.    Date: 2/15/2021  Time: 8:12 AM    Angiographic findings:  Distal abdominal aorta:  This is a medium-sized vessel free of significant disease.    Right lower extremity:  The common iliac is a medium-sized vessel with luminal irregularities but no critical lesions.  The external internal iliacs appear patent.  The common femoral is a medium-sized vessel with luminal irregularities but no critical lesions, mildly calcified.  The profunda femoris appears patent.  The SFA is a medium-sized vessel mildly calcified, with luminal irregularities but no critical lesions.  The popliteal artery is a medium-sized vessel with a 760-70 percent stenosis in the distal segment and there is 1-2 vessel runoff down to the foot.    Left lower extremity:  The common iliac is a medium-sized vessel luminal irregularities.  The external and internal iliacs appear patent, the common femoral artery is medium-sized vessel with luminal irregularities, mildly calcified, free of significant disease.  The profunda femoris appears patent.  The SFA is a medium-sized vessel, mildly calcified, with a 90 percent stenosis in the proximal to  "mid segment.  The popliteal artery appears patent at the is a 2 vessel runoff down to the foot.    Intervention:  By using a 6 x 40 millimeter stent, the 90 percent stenosis of the proximal to mid SFA was successfully angioplastied and stented with 0 percent residual and brisk flow in distal vessel.      Other Most Recent Cardiology Results  Results for orders placed during the hospital encounter of 09/04/23    CARDIAC MONITORING STRIPS      REVIEW OF SYSTEMS: As noted in HPI   CARDIOVASCULAR: +edema. No recent chest pain, palpitations, arm/neck/jaw pain.  RESPIRATORY: +chronic SOB. No recent fever, cough.   : No blood in the urine  GI: No reflux, nausea, vomiting, or blood in stool.   MUSCULOSKELETAL: No falls.   NEURO: No headaches, syncope, or dizziness.  EYES: No sudden changes in vision.     Past Medical History:   Diagnosis Date    Anticoagulant long-term use     Arthritis     COPD (chronic obstructive pulmonary disease)     Hypertension     PAD (peripheral artery disease)     PUD (peptic ulcer disease)      Past Surgical History:   Procedure Laterality Date    APPENDECTOMY      CERVICAL SPINE SURGERY      EPIDURAL STEROID INJECTION INTO LUMBAR SPINE N/A 10/5/2022    Procedure: Injection-steroid-epidural-lumbar L3/4;  Surgeon: Linwood Aponte MD;  Location: [facility] OR;  Service: Pain Management;  Laterality: N/A;    EYE SURGERY Left     "as  a child"    INJECTION OF ANESTHETIC AGENT AROUND MEDIAL BRANCH NERVES INNERVATING LUMBAR FACET JOINT Bilateral 2/9/2021    Procedure: Block-nerve-medial branch-lumbar L3/4, L4/5, L5/S1;  Surgeon: Linwood Aponte MD;  Location: [facility] OR;  Service: Pain Management;  Laterality: Bilateral;    LUMBAR DISC SURGERY      PERCUTANEOUS TRANSLUMINAL ANGIOPLASTY N/A 2/15/2021    Procedure: Pta (Angioplasty, Percutaneous, Transluminal);  Surgeon: Jl Neil MD;  Location: [facility] CATH;  Service: Cardiology;  Laterality: N/A;    RADIOFREQUENCY ABLATION Right 3/1/2021    " Procedure: Radiofrequency Ablation L3/4, L4/5;  Surgeon: Linwood Aponte MD;  Location: [facility] OR;  Service: Pain Management;  Laterality: Right;    RADIOFREQUENCY ABLATION Left 3/10/2021    Procedure: Radiofrequency Ablation L3/4, L4/5;  Surgeon: Linwood Aponte MD;  Location: [facility] OR;  Service: Pain Management;  Laterality: Left;    RADIOFREQUENCY ABLATION OF LUMBAR MEDIAL BRANCH NERVE AT SINGLE LEVEL Bilateral 8/23/2022    Procedure: Radiofrequency Ablation, Nerve, Spinal, Lumbar, Medial Branch, L3/4, L4/5;  Surgeon: Linwood Aponte MD;  Location: [facility] OR;  Service: Pain Management;  Laterality: Bilateral;     Social History     Tobacco Use    Smoking status: Former     Current packs/day: 0.50     Types: Cigarettes    Smokeless tobacco: Never    Tobacco comments:     Quit about 2 months ago in Feb 2021   Substance Use Topics    Alcohol use: Yes     Alcohol/week: 2.0 standard drinks of alcohol     Types: 2 Cans of beer per week     Comment: every other week    Drug use: Never         Objective      Vitals:    10/04/23 1502   BP: (!) 168/89   Pulse: 92       LAST EKG  Results for orders placed or performed during the hospital encounter of 09/04/23   EKG 12-lead    Collection Time: 09/04/23  9:14 PM    Narrative    Test Reason : R06.02,    Vent. Rate : 138 BPM     Atrial Rate : 258 BPM     P-R Int : 000 ms          QRS Dur : 092 ms      QT Int : 318 ms       P-R-T Axes : 000 084 015 degrees     QTc Int : 481 ms    Atrial fibrillation with rapid ventricular response  Incomplete right bundle branch block  Nonspecific ST and T wave abnormality  Abnormal ECG  When compared with ECG of 24-MAR-2022 14:48,  Significant changes have occurred  Confirmed by ANN-MARIE HOLLOWAY MD ([id number]) on 9/5/2023 4:17:59 PM    Referred By: [email or web address]   SELF           Confirmed By:ANN-MARIE HOLLOWAY MD     LIPIDS - LAST 2   Lab Results   Component Value Date    CHOL 212 (H) 03/15/2023    CHOL 220 (H) 03/03/2022    HDL 62 03/15/2023    HDL 75  "03/03/2022    LDLCALC 129.2 03/15/2023    LDLCALC 133.4 03/03/2022    TRIG 104 03/15/2023    TRIG 58 03/03/2022    CHOLHDL 29.2 03/15/2023    CHOLHDL 34.1 03/03/2022     CARDIAC PROFILE - LAST 2  Lab Results   Component Value Date    BNP <10 09/25/2020    TROPONINI 0.013 09/04/2023    TROPONINI 0.014 09/04/2023      CBC - LAST 2  Lab Results   Component Value Date    WBC 8.46 09/08/2023    WBC 11.65 09/07/2023    RBC 4.63 09/08/2023    RBC 4.71 09/07/2023    HGB 14.3 09/08/2023    HGB 14.1 09/07/2023    HCT 42.9 09/08/2023    HCT 43.1 09/07/2023     09/08/2023     09/07/2023     No results found for: "LABPT", "INR", "APTT"  CHEMISTRY - LAST 2  Lab Results   Component Value Date     (L) 09/08/2023     (L) 09/07/2023    K 3.8 09/08/2023    K 3.9 09/07/2023    CL 96 09/08/2023    CL 95 09/07/2023    CO2 31 09/08/2023    CO2 32 (H) 09/07/2023    ANIONGAP 4 (L) 09/08/2023    ANIONGAP 7 09/07/2023    BUN 20 09/08/2023    BUN 21 09/07/2023    CREATININE 0.77 09/08/2023    CREATININE 0.88 09/07/2023     (H) 09/08/2023     09/07/2023    CALCIUM 8.5 09/08/2023    CALCIUM 8.5 09/07/2023    MG 2.0 09/04/2023    MG 2.2 08/04/2022    ALBUMIN 4.4 09/04/2023    ALBUMIN 3.9 03/15/2023    PROT 8.3 09/04/2023    PROT 7.2 03/15/2023    ALKPHOS 95 09/04/2023    ALKPHOS 93 03/15/2023    ALT 40 09/04/2023    ALT 32 03/15/2023    AST 36 09/04/2023    AST 21 03/15/2023    BILITOT 0.6 09/04/2023    BILITOT 0.5 03/15/2023      ENDOCRINE - LAST 2  Lab Results   Component Value Date    HGBA1C 6.1 (H) 03/15/2023    HGBA1C 6.1 (H) 08/04/2022    TSH 2.330 09/05/2023    TSH 3.103 03/15/2023        PHYSICAL EXAM  CONSTITUTIONAL: Well built, well nourished in no apparent distress  NECK: no carotid bruit, no JVD  LUNGS: end expiratory wheezing   CHEST WALL: no tenderness  HEART:irregular rhythm, S1, S2 normal, no murmur, click, rub or gallop   ABDOMEN: soft, non-tender; bowel sounds normal; no masses,  no " organomegaly  EXTREMITIES: mild edema present   NEURO: AAO X 3    I HAVE REVIEWED :    The vital signs, most recent cardiac testing, and most recent pertinent non-cardiology provider notes.    Current Outpatient Medications   Medication Instructions    apixaban (ELIQUIS) 5 mg, Oral, 2 times daily    atorvastatin (LIPITOR) 20 mg, Oral, Daily    budesonide-formoterol 80-4.5 mcg (SYMBICORT) 80-4.5 mcg/actuation HFAA 2 puffs, Inhalation, 2 times daily, Controller    clopidogreL (PLAVIX) 75 mg tablet Take one tablet by mouth daily    diclofenac sodium (VOLTAREN) 2 g, Topical (Top), 4 times daily    diltiaZEM (CARDIZEM CD) 240 mg, Oral, Daily    gabapentin (NEURONTIN) 600 mg, Oral, 2 times daily PRN    hydroCHLOROthiazide (HYDRODIURIL) 25 mg, Oral, Daily    metFORMIN (GLUCOPHAGE-XR) 500 mg, Oral, Daily    tiotropium (SPIRIVA WITH HANDIHALER) 18 mcg, Inhalation, Daily, Controller    tiZANidine (ZANAFLEX) 4 mg, Oral, 3 times daily PRN    valsartan (DIOVAN) 320 mg, Oral, Daily      Assessment & Plan     PVD (peripheral vascular disease)  Cont Plavix statin     Essential hypertension  Elevated   Increase HCTZ to 25 mg daily   Continue valsartan 320 mg daily   Continue cardziem 240 mg daily   Low Na diet     Paroxysmal atrial fibrillation  Rate controlled today   Continue eliquis 5 mg BID -- no bleeding tendencies noted   Continue cardizem 240 mg daily   Holter to assess burden at some point but relatively asymptomatic     COPD (chronic obstructive pulmonary disease)  Encouraged follow up with [facility] pulmonology- he will call soon           Follow up in about 6 weeks (around 11/15/2023).     [facility]   Office: [phone]

## 2023-10-05 PROBLEM — I10 ACCELERATED HYPERTENSION: Status: RESOLVED | Noted: 2023-09-06 | Resolved: 2023-10-05

## 2023-11-17 ENCOUNTER — TELEPHONE (OUTPATIENT)
Dept: CARDIOLOGY | Facility: CLINIC | Age: 68
End: 2023-11-17
Payer: MEDICARE

## 2023-11-17 NOTE — TELEPHONE ENCOUNTER
----- Message from Kailyn Blanton sent at 11/17/2023 10:26 AM CST -----  Regarding: call back  Type:  Needs Medical Advice    Who Called: Pt      Would the patient rather a call back or a response via [facility]? Call back    Best Call Back Number: [phone]    Additional Information: apixaban (ELIQUIS) 5 mg Tab pt sts he needs another meds because the price is now triple. Please advise --------------thank you

## 2023-11-30 ENCOUNTER — OFFICE VISIT (OUTPATIENT)
Dept: CARDIOLOGY | Facility: CLINIC | Age: 68
End: 2023-11-30
Payer: MEDICARE

## 2023-11-30 VITALS
BODY MASS INDEX: 44.31 KG/M2 | HEART RATE: 93 BPM | DIASTOLIC BLOOD PRESSURE: 90 MMHG | WEIGHT: 299.19 LBS | SYSTOLIC BLOOD PRESSURE: 182 MMHG | HEIGHT: 69 IN

## 2023-11-30 DIAGNOSIS — I10 ESSENTIAL HYPERTENSION: Primary | ICD-10-CM

## 2023-11-30 DIAGNOSIS — I73.9 PVD (PERIPHERAL VASCULAR DISEASE): ICD-10-CM

## 2023-11-30 DIAGNOSIS — E66.01 MORBID (SEVERE) OBESITY DUE TO EXCESS CALORIES: ICD-10-CM

## 2023-11-30 DIAGNOSIS — I48.0 PAROXYSMAL ATRIAL FIBRILLATION: ICD-10-CM

## 2023-11-30 DIAGNOSIS — J44.9 CHRONIC OBSTRUCTIVE PULMONARY DISEASE, UNSPECIFIED COPD TYPE: ICD-10-CM

## 2023-11-30 DIAGNOSIS — F17.200 TOBACCO USE DISORDER: ICD-10-CM

## 2023-11-30 PROBLEM — I48.91 ATRIAL FIBRILLATION WITH RVR: Status: RESOLVED | Noted: 2022-03-07 | Resolved: 2023-11-30

## 2023-11-30 PROCEDURE — 3008F BODY MASS INDEX DOCD: CPT | Mod: HCNC,CPTII,S$GLB,

## 2023-11-30 PROCEDURE — 3080F DIAST BP >= 90 MM HG: CPT | Mod: HCNC,CPTII,S$GLB,

## 2023-11-30 PROCEDURE — 3077F PR MOST RECENT SYSTOLIC BLOOD PRESSURE >= 140 MM HG: ICD-10-PCS | Mod: HCNC,CPTII,S$GLB,

## 2023-11-30 PROCEDURE — 3080F PR MOST RECENT DIASTOLIC BLOOD PRESSURE >= 90 MM HG: ICD-10-PCS | Mod: HCNC,CPTII,S$GLB,

## 2023-11-30 PROCEDURE — 99214 PR OFFICE/OUTPT VISIT, EST, LEVL IV, 30-39 MIN: ICD-10-PCS | Mod: HCNC,S$GLB,,

## 2023-11-30 PROCEDURE — 1126F PR PAIN SEVERITY QUANTIFIED, NO PAIN PRESENT: ICD-10-PCS | Mod: HCNC,CPTII,S$GLB,

## 2023-11-30 PROCEDURE — 1101F PR PT FALLS ASSESS DOC 0-1 FALLS W/OUT INJ PAST YR: ICD-10-PCS | Mod: HCNC,CPTII,S$GLB,

## 2023-11-30 PROCEDURE — 4010F PR ACE/ARB THEARPY RXD/TAKEN: ICD-10-PCS | Mod: HCNC,CPTII,S$GLB,

## 2023-11-30 PROCEDURE — 3288F FALL RISK ASSESSMENT DOCD: CPT | Mod: HCNC,CPTII,S$GLB,

## 2023-11-30 PROCEDURE — 1159F MED LIST DOCD IN RCRD: CPT | Mod: HCNC,CPTII,S$GLB,

## 2023-11-30 PROCEDURE — 3044F HG A1C LEVEL LT 7.0%: CPT | Mod: HCNC,CPTII,S$GLB,

## 2023-11-30 PROCEDURE — 3288F PR FALLS RISK ASSESSMENT DOCUMENTED: ICD-10-PCS | Mod: HCNC,CPTII,S$GLB,

## 2023-11-30 PROCEDURE — 1159F PR MEDICATION LIST DOCUMENTED IN MEDICAL RECORD: ICD-10-PCS | Mod: HCNC,CPTII,S$GLB,

## 2023-11-30 PROCEDURE — 3077F SYST BP >= 140 MM HG: CPT | Mod: HCNC,CPTII,S$GLB,

## 2023-11-30 PROCEDURE — 3044F PR MOST RECENT HEMOGLOBIN A1C LEVEL <7.0%: ICD-10-PCS | Mod: HCNC,CPTII,S$GLB,

## 2023-11-30 PROCEDURE — 1101F PT FALLS ASSESS-DOCD LE1/YR: CPT | Mod: HCNC,CPTII,S$GLB,

## 2023-11-30 PROCEDURE — 3008F PR BODY MASS INDEX (BMI) DOCUMENTED: ICD-10-PCS | Mod: HCNC,CPTII,S$GLB,

## 2023-11-30 PROCEDURE — 1126F AMNT PAIN NOTED NONE PRSNT: CPT | Mod: HCNC,CPTII,S$GLB,

## 2023-11-30 PROCEDURE — 99999 PR PBB SHADOW E&M-EST. PATIENT-LVL III: CPT | Mod: PBBFAC,HCNC,,

## 2023-11-30 PROCEDURE — 4010F ACE/ARB THERAPY RXD/TAKEN: CPT | Mod: HCNC,CPTII,S$GLB,

## 2023-11-30 PROCEDURE — 99214 OFFICE O/P EST MOD 30 MIN: CPT | Mod: HCNC,S$GLB,,

## 2023-11-30 PROCEDURE — 99999 PR PBB SHADOW E&M-EST. PATIENT-LVL III: ICD-10-PCS | Mod: PBBFAC,HCNC,,

## 2023-11-30 NOTE — PROGRESS NOTES
Subjective:    Patient ID:  Bossman Duke is a 68 y.o. male patient here for evaluation No chief complaint on file.    History of Present Illness:     Mr. Duke is a 68 year old M who follows with Dr. Neil here today for a six week follow up. He is doing much better but continues with some chronic LAWSON and has a cold right now that he caught from his brother. Has not yet seen pulmonology in follow up but has an appt scheduled in Jan. He seems to continue to be going in and out of atrial fibrillation but is asymptomatic. He discontinued his eliquis due to stool and gum bleeding. No chest pain worsening claudication palpitations dizziness syncope. LE edema improved.        Most Recent Echocardiogram Results  Results for orders placed during the hospital encounter of 09/04/23    Echo    Interpretation Summary    Left Ventricle: The left ventricle is normal in size. Normal wall motion. There is normal systolic function with a visually estimated ejection fraction of 55 - 60%. There is normal diastolic function.    Right Ventricle: Normal right ventricular cavity size. Systolic function is normal.    IVC/SVC: Normal venous pressure at 3 mmHg.      Most Recent Nuclear Stress Test Results  Results for orders placed during the hospital encounter of 12/09/20    Nuclear Stress - Cardiology Interpreted    Interpretation Summary    Normal myocardial perfusion scan. There is no evidence of myocardial ischemia or infarction.    There is a  mild intensity fixed defect in the inferior wall of the left ventricle secondary to diaphragm attenuation.    Gated perfusion images showed an ejection fraction of 60%    The EKG portion of this study is negative for ischemia.    Arrhythmias during stress: PVCs.    There are no prior studies for comparison.      Most Recent Cardiac PET Stress Test Results  No results found for this or any previous visit.      Most Recent Cardiovascular Angiogram results  Results for orders placed during  the hospital encounter of 02/15/21    Cardiac catheterization    Conclusion  · Significant PAD as described in the text  · Successful PTA and stenting of the lesion in the proximal to mid SFA    The procedure log was documented by No documenter listed and verified by Jl Neil MD.    Date: 2/15/2021  Time: 8:12 AM    Angiographic findings:  Distal abdominal aorta:  This is a medium-sized vessel free of significant disease.    Right lower extremity:  The common iliac is a medium-sized vessel with luminal irregularities but no critical lesions.  The external internal iliacs appear patent.  The common femoral is a medium-sized vessel with luminal irregularities but no critical lesions, mildly calcified.  The profunda femoris appears patent.  The SFA is a medium-sized vessel mildly calcified, with luminal irregularities but no critical lesions.  The popliteal artery is a medium-sized vessel with a 760-70 percent stenosis in the distal segment and there is 1-2 vessel runoff down to the foot.    Left lower extremity:  The common iliac is a medium-sized vessel luminal irregularities.  The external and internal iliacs appear patent, the common femoral artery is medium-sized vessel with luminal irregularities, mildly calcified, free of significant disease.  The profunda femoris appears patent.  The SFA is a medium-sized vessel, mildly calcified, with a 90 percent stenosis in the proximal to mid segment.  The popliteal artery appears patent at the is a 2 vessel runoff down to the foot.    Intervention:  By using a 6 x 40 millimeter stent, the 90 percent stenosis of the proximal to mid SFA was successfully angioplastied and stented with 0 percent residual and brisk flow in distal vessel.      Other Most Recent Cardiology Results  Results for orders placed during the hospital encounter of 09/04/23    CARDIAC MONITORING STRIPS      REVIEW OF SYSTEMS: As noted in HPI   CARDIOVASCULAR: No recent chest pain,  "palpitations, arm/neck/jaw pain, or edema.  RESPIRATORY: +congestion +SOB. No recent fever.   : No blood in the urine  GI: +dark stools (resolved with d/c eliquis). No reflux, nausea, vomiting.  MUSCULOSKELETAL: No falls.   NEURO: No headaches, syncope, or dizziness.  EYES: No sudden changes in vision.     Past Medical History:   Diagnosis Date    Anticoagulant long-term use     Arthritis     COPD (chronic obstructive pulmonary disease)     Hypertension     PAD (peripheral artery disease)     PUD (peptic ulcer disease)      Past Surgical History:   Procedure Laterality Date    APPENDECTOMY      CERVICAL SPINE SURGERY      EPIDURAL STEROID INJECTION INTO LUMBAR SPINE N/A 10/5/2022    Procedure: Injection-steroid-epidural-lumbar L3/4;  Surgeon: Linwood Aponte MD;  Location: [facility] OR;  Service: Pain Management;  Laterality: N/A;    EYE SURGERY Left     "as  a child"    INJECTION OF ANESTHETIC AGENT AROUND MEDIAL BRANCH NERVES INNERVATING LUMBAR FACET JOINT Bilateral 2/9/2021    Procedure: Block-nerve-medial branch-lumbar L3/4, L4/5, L5/S1;  Surgeon: Linwood Aponte MD;  Location: [facility] OR;  Service: Pain Management;  Laterality: Bilateral;    LUMBAR DISC SURGERY      PERCUTANEOUS TRANSLUMINAL ANGIOPLASTY N/A 2/15/2021    Procedure: Pta (Angioplasty, Percutaneous, Transluminal);  Surgeon: Jl Neil MD;  Location: [facility] CATH;  Service: Cardiology;  Laterality: N/A;    RADIOFREQUENCY ABLATION Right 3/1/2021    Procedure: Radiofrequency Ablation L3/4, L4/5;  Surgeon: Linwood Aponte MD;  Location: [facility] OR;  Service: Pain Management;  Laterality: Right;    RADIOFREQUENCY ABLATION Left 3/10/2021    Procedure: Radiofrequency Ablation L3/4, L4/5;  Surgeon: Linwood Aponte MD;  Location: [facility] OR;  Service: Pain Management;  Laterality: Left;    RADIOFREQUENCY ABLATION OF LUMBAR MEDIAL BRANCH NERVE AT SINGLE LEVEL Bilateral 8/23/2022    Procedure: Radiofrequency Ablation, Nerve, Spinal, Lumbar, Medial Branch, L3/4, " L4/5;  Surgeon: Linwood Aponte MD;  Location: [facility];  Service: Pain Management;  Laterality: Bilateral;     Social History     Tobacco Use    Smoking status: Former     Current packs/day: 0.50     Types: Cigarettes    Smokeless tobacco: Never    Tobacco comments:     Quit about 2 months ago in Feb 2021   Substance Use Topics    Alcohol use: Yes     Alcohol/week: 2.0 standard drinks of alcohol     Types: 2 Cans of beer per week     Comment: every other week    Drug use: Never         Objective    There were no vitals filed for this visit.    LAST EKG  Results for orders placed or performed during the hospital encounter of 09/04/23   EKG 12-lead    Collection Time: 09/04/23  9:14 PM    Narrative    Test Reason : R06.02,    Vent. Rate : 138 BPM     Atrial Rate : 258 BPM     P-R Int : 000 ms          QRS Dur : 092 ms      QT Int : 318 ms       P-R-T Axes : 000 084 015 degrees     QTc Int : 481 ms    Atrial fibrillation with rapid ventricular response  Incomplete right bundle branch block  Nonspecific ST and T wave abnormality  Abnormal ECG  When compared with ECG of 24-MAR-2022 14:48,  Significant changes have occurred  Confirmed by ANN-MARIE HOLLOWAY MD ([id number]) on 9/5/2023 4:17:59 PM    Referred By: [email or web address]   SELF           Confirmed By:ANN-MARIE HOLLOWAY MD     LIPIDS - LAST 2   Lab Results   Component Value Date    CHOL 212 (H) 03/15/2023    CHOL 220 (H) 03/03/2022    HDL 62 03/15/2023    HDL 75 03/03/2022    LDLCALC 129.2 03/15/2023    LDLCALC 133.4 03/03/2022    TRIG 104 03/15/2023    TRIG 58 03/03/2022    CHOLHDL 29.2 03/15/2023    CHOLHDL 34.1 03/03/2022     CARDIAC PROFILE - LAST 2  Lab Results   Component Value Date    BNP <10 09/25/2020    TROPONINI 0.013 09/04/2023    TROPONINI 0.014 09/04/2023      CBC - LAST 2  Lab Results   Component Value Date    WBC 8.46 09/08/2023    WBC 11.65 09/07/2023    RBC 4.63 09/08/2023    RBC 4.71 09/07/2023    HGB 14.3 09/08/2023    HGB 14.1 09/07/2023    HCT 42.9  "09/08/2023    HCT 43.1 09/07/2023     09/08/2023     09/07/2023     No results found for: "LABPT", "INR", "APTT"  CHEMISTRY - LAST 2  Lab Results   Component Value Date     (L) 09/08/2023     (L) 09/07/2023    K 3.8 09/08/2023    K 3.9 09/07/2023    CL 96 09/08/2023    CL 95 09/07/2023    CO2 31 09/08/2023    CO2 32 (H) 09/07/2023    ANIONGAP 4 (L) 09/08/2023    ANIONGAP 7 09/07/2023    BUN 20 09/08/2023    BUN 21 09/07/2023    CREATININE 0.77 09/08/2023    CREATININE 0.88 09/07/2023     (H) 09/08/2023     09/07/2023    CALCIUM 8.5 09/08/2023    CALCIUM 8.5 09/07/2023    MG 2.0 09/04/2023    MG 2.2 08/04/2022    ALBUMIN 4.4 09/04/2023    ALBUMIN 3.9 03/15/2023    PROT 8.3 09/04/2023    PROT 7.2 03/15/2023    ALKPHOS 95 09/04/2023    ALKPHOS 93 03/15/2023    ALT 40 09/04/2023    ALT 32 03/15/2023    AST 36 09/04/2023    AST 21 03/15/2023    BILITOT 0.6 09/04/2023    BILITOT 0.5 03/15/2023      ENDOCRINE - LAST 2  Lab Results   Component Value Date    HGBA1C 6.1 (H) 03/15/2023    HGBA1C 6.1 (H) 08/04/2022    TSH 2.330 09/05/2023    TSH 3.103 03/15/2023        PHYSICAL EXAM  CONSTITUTIONAL: Well built, well nourished in no apparent distress  NECK: no carotid bruit, no JVD  LUNGS: CTA  CHEST WALL: no tenderness  HEART: regular rate and rhythm, S1, S2 normal, no murmur, click, rub or gallop   ABDOMEN: soft, non-tender; bowel sounds normal; no masses,  no organomegaly  EXTREMITIES: Extremities normal, no edema, no calf tenderness noted  NEURO: AAO X 3    I HAVE REVIEWED :    The vital signs, most recent cardiac testing, and most recent pertinent non-cardiology provider notes.    Current Outpatient Medications   Medication Instructions    apixaban (ELIQUIS) 5 mg, Oral, 2 times daily    atorvastatin (LIPITOR) 20 mg, Oral, Daily    clopidogreL (PLAVIX) 75 mg tablet Take one tablet by mouth daily    diclofenac sodium (VOLTAREN) 2 g, Topical (Top), 4 times daily    diltiaZEM (CARDIZEM " CD) 240 mg, Oral, Daily    hydroCHLOROthiazide (HYDRODIURIL) 25 mg, Oral, Daily    tiotropium (SPIRIVA WITH HANDIHALER) 18 mcg, Inhalation, Daily, Controller    tiZANidine (ZANAFLEX) 4 mg, Oral, 3 times daily PRN    valsartan (DIOVAN) 320 mg, Oral, Daily      Assessment & Plan     1. Essential hypertension  Reports controlled BP at home   Continue cardizem 240 mg daily   Continue HCTZ 25 mg daily   Continue valsartan 320 mg daily     2. PVD (peripheral vascular disease)  S/p PTA mid SFA 2021     3. Paroxysmal atrial fibrillation  He d/c'ed eliquis due to gum bleeding and black stools -- consider watchman (pamphlet given)   Continue cardizem 240 mg daily     4. Chronic obstructive pulmonary disease, unspecified COPD type  Has first f/u with pulmonology in January     5. Morbid (severe) obesity due to excess calories  Low level/low impact aerobic exercise 5x's/wk recommended. Heart healthy diet and risk factor modification discussed with patient.     6. Tobacco use disorder  Cessation discussed            4 month follow up     Naomi Peters, PA-C [facility]   Office: [phone]

## 2024-01-15 PROBLEM — G47.33 OSA (OBSTRUCTIVE SLEEP APNEA): Status: ACTIVE | Noted: 2024-01-15

## 2024-01-15 PROBLEM — R91.1 SOLITARY PULMONARY NODULE: Status: ACTIVE | Noted: 2024-01-15

## 2024-01-18 RX ORDER — CLOPIDOGREL BISULFATE 75 MG/1
75 TABLET ORAL DAILY
Qty: 90 TABLET | Refills: 3 | Status: SHIPPED | OUTPATIENT
Start: 2024-01-18

## 2024-01-18 NOTE — PROGRESS NOTES
"Advanced Heart Failure and Cardiac Transplantation Cardiology    Nephrologist: Dr. Jordan Parra ([facility])    Parker Tejada is a 57 y.o. male from [place], [place]. Here for routine visit. He feels quite well without symptoms.    Exam: BP 92/60   Pulse 62   Temp 36.6 °C (97.9 °F)   Resp 16   Ht 1.829 m (6')   Wt 102 kg (224 lb 6.4 oz)   SpO2 96%   BMI 30.43 kg/m²   No JVD  RRR  CTA  Well appearing    Current Outpatient Medications   Medication Instructions    amoxicillin (AMOXIL) 2,000 mg, oral, Prior to dental procedures     ascorbic acid, vitamin C, 1,000 mg ER tablet 1 tablet, oral, Daily    aspirin 81 mg, oral, Daily    cholecalciferol (VITAMIN D-3) 25 mcg, oral, Daily    doxycycline (VIBRA-TABS) 100 mg, oral, 2 times daily    Entresto 24-26 mg tablet 1 tablet, oral, 2 times daily    Farxiga 5 mg, oral, Daily    metoprolol succinate XL (TOPROL-XL) 25 mg, oral, 2 times daily    rosuvastatin (CRESTOR) 10 mg, oral, Daily    spironolactone (ALDACTONE) 12.5 mg, oral, Daily     Past medical history (non-cardiac):  -- Former cigar smoker (quit 2021)  -- 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 [\"IEVAOC chemo\" (Ifosfamide, etoposide, mesna)] and surgical reconstruction of his leg including cadaver bone and metal scarlett; had total of 8 surgeries on leg most recent June- because of progressive decline of cadaveric bone now replaced by metal joints/rods; Sarcoma is now considered cured per patient  -- COVID-recovered (Jan 2022); Had COVID recently despite vaccination  -- CKD Stage 3B     Cardiovascular history:  -- Per report non-obstructive CAD in one vessel ([facility] 2008)  -- Cardiomyopathy (1/2022), unknown etiology (DDx chemo-induced, related to CAD less likely but possible)  -- Stage C HFrEF, currently NYHA class I; LVEF 30% [dosing of GDMT limited by BP and CKD]  -- ICD (primary prevention; [facility], 12/2022)    Assessment: Cardiomyopathy and HFrEF (Stage C), CKD stage 3B. Overall doing " Status/Diagnosis: Minimally displaced Right lateral malleolus fracture, Pichardo B.  Date of Surgery: none  Date of Injury: 06/10/2023  Return visit: PRN  X-rays on Return: pending patient complaint    Chief Complaint:   Right ankle fracture     Present History:  Bossman Duke is a 68 y.o. male who presents today for new patient evaluation.  Endorses sustaining a twisting type right ankle injury on 06/10.  Immediate pain and swelling but self or bear weight.  Due to persistent swelling was seen in the ED 2 days after.  X-rays at that time confirmed fracture.  Patient was given crutches with instructions for outpatient orthopedic follow-up.  Denies any pain prior to injury.  Denies any numbness or tingling.  Past medical history significant for left lower extremity peripheral arterial disease status post stent placement on Plavix, hypertension, COPD, chronic tobacco use.    06/29/2023:  Patient returns today with significant symptomatic relief.  Currently 4/10 pain only when up on his feet for long periods.  He was weight-bearing as tolerated in the tall boot using a walker for ambulation.  Reports he was in the walker full-time as he had increased hip pain with cane use.  No new injuries.  Denies any numbness or tingling.    08/01/2023:  Patient returns today for routine follow-up.  Currently weight-bearing as tolerated in normal shoe wear using a cane for ambulation.  Reports he self discontinued the tall boot approximately 1 week after his last visit.  Overall doing well.  0/10 pain.  Denies any new injuries.  Denies any numbness or tingling.    09/22/2023:  Patient returns today for repeat clinical evaluation.  Reports he was recently discharged from the hospital for new COVID diagnosis in patient with history of COPD.  Currently ambulating well in regard to his right ankle.  Only complaint today is acute on chronic bilateral knee pain.  Currently using a walker for ambulation.      Past Medical History:   Diagnosis  "Date    Anticoagulant long-term use     Arthritis     COPD (chronic obstructive pulmonary disease)     Hypertension     PAD (peripheral artery disease)     PUD (peptic ulcer disease)        Past Surgical History:   Procedure Laterality Date    APPENDECTOMY      CERVICAL SPINE SURGERY      EPIDURAL STEROID INJECTION INTO LUMBAR SPINE N/A 10/5/2022    Procedure: Injection-steroid-epidural-lumbar L3/4;  Surgeon: Linwood Aponte MD;  Location: [facility] OR;  Service: Pain Management;  Laterality: N/A;    EYE SURGERY Left     "as  a child"    INJECTION OF ANESTHETIC AGENT AROUND MEDIAL BRANCH NERVES INNERVATING LUMBAR FACET JOINT Bilateral 2/9/2021    Procedure: Block-nerve-medial branch-lumbar L3/4, L4/5, L5/S1;  Surgeon: Linwood Aponte MD;  Location: [facility] OR;  Service: Pain Management;  Laterality: Bilateral;    LUMBAR DISC SURGERY      PERCUTANEOUS TRANSLUMINAL ANGIOPLASTY N/A 2/15/2021    Procedure: Pta (Angioplasty, Percutaneous, Transluminal);  Surgeon: Jl Neil MD;  Location: [facility] CATH;  Service: Cardiology;  Laterality: N/A;    RADIOFREQUENCY ABLATION Right 3/1/2021    Procedure: Radiofrequency Ablation L3/4, L4/5;  Surgeon: Linwood Aponte MD;  Location: [facility] OR;  Service: Pain Management;  Laterality: Right;    RADIOFREQUENCY ABLATION Left 3/10/2021    Procedure: Radiofrequency Ablation L3/4, L4/5;  Surgeon: Linwood Aponte MD;  Location: [facility] OR;  Service: Pain Management;  Laterality: Left;    RADIOFREQUENCY ABLATION OF LUMBAR MEDIAL BRANCH NERVE AT SINGLE LEVEL Bilateral 8/23/2022    Procedure: Radiofrequency Ablation, Nerve, Spinal, Lumbar, Medial Branch, L3/4, L4/5;  Surgeon: Linwood Aponte MD;  Location: [facility] OR;  Service: Pain Management;  Laterality: Bilateral;       Current Outpatient Medications   Medication Sig    apixaban (ELIQUIS) 5 mg Tab Take 1 tablet (5 mg total) by mouth 2 (two) times daily.    atorvastatin (LIPITOR) 20 MG tablet Take 1 tablet (20 mg total) by mouth once daily.    " quite well. Highly functional and feels ok.    Plan:  -- On max tolerated GDMT for HFrEF. No changes at this time.    Cisco Gamboa MD, MPH  Advanced Heart Failure and Transplant Cardiology  [facility]       clopidogreL (PLAVIX) 75 mg tablet Take one tablet by mouth daily (Patient taking differently: Take 75 mg by mouth once daily.)    diltiaZEM (CARDIZEM CD) 240 MG 24 hr capsule Take 1 capsule (240 mg total) by mouth once daily.    gabapentin (NEURONTIN) 300 MG capsule Take 600 mg by mouth 2 (two) times daily as needed (pain).    glycopyrrolate-formoteroL (BEVESPI AEROSPHERE) 9-4.8 mcg HFAA Inhale 1 puff into the lungs once daily. Controller    hydroCHLOROthiazide (HYDRODIURIL) 12.5 MG Tab Take 1 tablet (12.5 mg total) by mouth once daily.    metFORMIN (GLUCOPHAGE-XR) 500 MG ER 24hr tablet Take 500 mg by mouth once daily.    tiZANidine (ZANAFLEX) 4 MG tablet Take 4 mg by mouth 3 (three) times daily as needed (muscle spasms).    umeclidinium-vilanteroL (ANORO ELLIPTA) 62.5-25 mcg/actuation DsDv Inhale 1 puff into the lungs once daily. Controller    valsartan (DIOVAN) 320 MG tablet Take 1 tablet (320 mg total) by mouth once daily.     No current facility-administered medications for this visit.       Review of patient's allergies indicates:   Allergen Reactions    Benicar [olmesartan] Other (See Comments)     Gave him severe fatigue and muscle cramps.     Lisinopril Other (See Comments)     Fatigue and insomnia    Metoprolol Shortness Of Breath       Family History   Problem Relation Age of Onset    Diabetes Mother     Heart disease Mother     Heart disease Father     Glaucoma Neg Hx     Macular degeneration Neg Hx     Retinal detachment Neg Hx        Social History     Socioeconomic History    Marital status:    Tobacco Use    Smoking status: Every Day     Current packs/day: 0.50     Types: Cigarettes    Smokeless tobacco: Never    Tobacco comments:     Quit about 2 months ago in Feb 2021   Substance and Sexual Activity    Alcohol use: Yes     Alcohol/week: 2.0 standard drinks of alcohol     Types: 2 Cans of beer per week     Comment: every other week    Drug use: Never       Physical exam:  There were no vitals  filed for this visit.  There is no height or weight on file to calculate BMI.  General: In no apparent distress; well developed and well nourished.  HEENT: normocephalic; atraumatic.  Cardiovascular: regular rate.  Respiratory: no increased work of breathing.  Musculoskeletal:   Gait:  Nonantalgic  Inspection:    Swelling about the ankle is at baseline, and symmetric.  No tenderness at the lateral malleolus fracture site.  No pain referable to the foot.  Ankle range of motion from 5° dorsiflexion to 50° plantar flexion with no pain at extremes.  Silfverskiold:  Deferred  Alignment:  Knee: neutral               Ankle: neutral              Hindfoot: neutral              Forefoot: neutral   Strength:              Dorsiflexion 5/5  Plantar flexion 5/5  Inversion 5/5   Eversion 5/5   Sensation:              Sensation intact to light touch distally.  ROM:              Deferred secondary to pain   Pulses: 2+ DP/PT pulses.                   Imaging Studies/Outside documentation:  I have ordered/reviewed/interpreted the following images/outside documentation:  1. WB 3-views of Right ankle:   On my independent review there is a persistent minimally displaced long spiral fracture of the lateral malleolus at the level of the syndesmosis.  Ankle mortise remains congruent. No evidence of interval fracture translation. Continued callus formation and bony bridging are present with near complete fracture healing. Overall alignment well-maintained.         Assessment:  Bossman Duke is a 68 y.o. male with Minimally displaced Right lateral malleolus fracture, Pichardo B.     Plan:   Clinical and radiographic findings were discussed.  Patient self discontinued his lace-up ankle brace.  He is using a walker for ambulation which I recommended he was last visit.  The lateral malleolus fracture appears to be well healed.  Okay to continue weight-bearing in normal shoe wear.  No high impact activities.  Patient voiced understanding.  All  questions were answered.  He will follow up on an as-needed basis.      This note was created using voice recognition software and may contain grammatical errors.

## 2024-04-12 ENCOUNTER — OFFICE VISIT (OUTPATIENT)
Dept: CARDIOLOGY | Facility: CLINIC | Age: 69
End: 2024-04-12
Payer: MEDICARE

## 2024-04-12 VITALS
BODY MASS INDEX: 45.1 KG/M2 | WEIGHT: 315 LBS | DIASTOLIC BLOOD PRESSURE: 79 MMHG | HEART RATE: 88 BPM | SYSTOLIC BLOOD PRESSURE: 159 MMHG | HEIGHT: 70 IN

## 2024-04-12 DIAGNOSIS — R06.02 SOB (SHORTNESS OF BREATH) ON EXERTION: ICD-10-CM

## 2024-04-12 DIAGNOSIS — I50.30 DIASTOLIC CONGESTIVE HEART FAILURE, UNSPECIFIED HF CHRONICITY: ICD-10-CM

## 2024-04-12 DIAGNOSIS — I73.9 PVD (PERIPHERAL VASCULAR DISEASE): ICD-10-CM

## 2024-04-12 DIAGNOSIS — I10 ESSENTIAL HYPERTENSION: ICD-10-CM

## 2024-04-12 DIAGNOSIS — I48.0 PAROXYSMAL ATRIAL FIBRILLATION: Primary | ICD-10-CM

## 2024-04-12 DIAGNOSIS — E66.01 MORBID (SEVERE) OBESITY DUE TO EXCESS CALORIES: ICD-10-CM

## 2024-04-12 PROCEDURE — 1101F PT FALLS ASSESS-DOCD LE1/YR: CPT | Mod: HCNC,CPTII,S$GLB, | Performed by: INTERNAL MEDICINE

## 2024-04-12 PROCEDURE — 1159F MED LIST DOCD IN RCRD: CPT | Mod: HCNC,CPTII,S$GLB, | Performed by: INTERNAL MEDICINE

## 2024-04-12 PROCEDURE — 99215 OFFICE O/P EST HI 40 MIN: CPT | Mod: HCNC,S$GLB,, | Performed by: INTERNAL MEDICINE

## 2024-04-12 PROCEDURE — 3008F BODY MASS INDEX DOCD: CPT | Mod: HCNC,CPTII,S$GLB, | Performed by: INTERNAL MEDICINE

## 2024-04-12 PROCEDURE — 99999 PR PBB SHADOW E&M-EST. PATIENT-LVL IV: CPT | Mod: PBBFAC,HCNC,, | Performed by: INTERNAL MEDICINE

## 2024-04-12 PROCEDURE — 3078F DIAST BP <80 MM HG: CPT | Mod: HCNC,CPTII,S$GLB, | Performed by: INTERNAL MEDICINE

## 2024-04-12 PROCEDURE — 1160F RVW MEDS BY RX/DR IN RCRD: CPT | Mod: HCNC,CPTII,S$GLB, | Performed by: INTERNAL MEDICINE

## 2024-04-12 PROCEDURE — 3288F FALL RISK ASSESSMENT DOCD: CPT | Mod: HCNC,CPTII,S$GLB, | Performed by: INTERNAL MEDICINE

## 2024-04-12 PROCEDURE — 1125F AMNT PAIN NOTED PAIN PRSNT: CPT | Mod: HCNC,CPTII,S$GLB, | Performed by: INTERNAL MEDICINE

## 2024-04-12 PROCEDURE — 3077F SYST BP >= 140 MM HG: CPT | Mod: HCNC,CPTII,S$GLB, | Performed by: INTERNAL MEDICINE

## 2024-04-12 PROCEDURE — 4010F ACE/ARB THERAPY RXD/TAKEN: CPT | Mod: HCNC,CPTII,S$GLB, | Performed by: INTERNAL MEDICINE

## 2024-04-12 RX ORDER — NAPROXEN SODIUM 220 MG/1
81 TABLET, FILM COATED ORAL ONCE
Status: CANCELLED | OUTPATIENT
Start: 2024-04-12 | End: 2024-04-12

## 2024-04-12 RX ORDER — SODIUM CHLORIDE 9 MG/ML
INJECTION, SOLUTION INTRAVENOUS ONCE
Status: CANCELLED | OUTPATIENT
Start: 2024-04-12 | End: 2024-04-12

## 2024-04-12 NOTE — PATIENT INSTRUCTIONS
Angiogram   4/24/24 10 am arrival time at 8 am    Arrive for procedure at: [facility]    You will receive a phone call from [facility] Pre-Op Department with further instructions and exact arrival time prior to your scheduled procedure.    Notify the nurse if you are ALLERGIC TO IODINE.    FASTING: You MAY NOT have anything to eat or drink AFTER MIDNIGHT the day before your procedure. If your procedure is scheduled in the afternoon, you may have a LIGHT BREAKFAST 6-8 hours prior to your procedure.  For example: Two slices of toast; black coffee or black tea.    MEDICATIONS: You may take your regular morning medications with water. If there are any medications that you should not take, you will be instructed to hold them for that morning.    CARDIOLOGY PRE-PROCEDURE MEDICATION ORDERS:  ** Please hold any medications that are checked below:    HOLD   # OF DAYS TO HOLD  Coumadin   Consult with Coumadin Clinic   Xarelto    _DAY BEFORE & DAY OF_  Pradaxa  _ DAY BEFORE & DAY OF _  Eliquis   _ DAY BEFORE & DAY OF _  Metformin    Day before procedure & morning of procedure  Short acting insulin   Morning of procedure    CONTINUE the Following Medications   Plavix      Effient     Aspirin    WHAT TO EXPECT:    How long will the procedure take?  The procedure will take an average of 1 - 2 hours to perform.  After the procedure, you will need to lay flat for around 4 - 6 hours to minimize bleeding from the puncture site. If the wrist is accessed you will need to keep your arm still as instructed by the nurse.    When can I go home?  You may be able to be discharged home that same afternoon if there were no complications.  If you have one of the following: balloon; stent; pacemaker or defibrillator procedures, you may spend one night for observation.  Your doctor will determine your discharge based upon your progress.  The results of your procedure will be discussed with you before you are discharged.  Any  further testing or procedures will be scheduled for you either before you leave or you will be instructed to call for a future appointment.      TRANSPORTATION:  PLEASE ARRANGE TO HAVE SOMEONE DRIVE YOU HOME FOLLOWING YOUR PROCEDURE, YOU WILL NOT BE ALLOWED TO DRIVE.

## 2024-04-12 NOTE — PROGRESS NOTES
Subjective:    Patient ID:  Bossman Duke is a 68 y.o. male who presents for follow-up of shortness of breath, peripheral arterial disease, atrial fibrillation    HPI  He comes for follow up with shortness of breath with moderate activity.  No chest pain.  He has gotten a new elliptical machine that he wants to use what he wants to make sure his heart is in good shape before trying that.    Of the occasion has not worsened.    Still in atrial fibrillation.  Unable to take Eliquis due to significant GI bleed.      Review of Systems   Constitutional: Negative for decreased appetite, malaise/fatigue, weight gain and weight loss.   Cardiovascular:  Negative for chest pain, dyspnea on exertion, leg swelling, palpitations and syncope.   Respiratory:  Negative for cough and shortness of breath.    Gastrointestinal: Negative.    Neurological:  Negative for weakness.   All other systems reviewed and are negative.     Objective:      Physical Exam  Vitals and nursing note reviewed.   Constitutional:       Appearance: Normal appearance. He is well-developed.   HENT:      Head: Normocephalic.   Eyes:      Pupils: Pupils are equal, round, and reactive to light.   Neck:      Thyroid: No thyromegaly.      Vascular: No carotid bruit or JVD.   Cardiovascular:      Rate and Rhythm: Normal rate. Rhythm irregularly irregular.      Chest Wall: PMI is not displaced.      Pulses: Normal pulses and intact distal pulses.      Heart sounds: Normal heart sounds. No murmur heard.     No gallop.   Pulmonary:      Effort: Pulmonary effort is normal.      Breath sounds: Normal breath sounds.   Abdominal:      Palpations: Abdomen is soft. There is no mass.      Tenderness: There is no abdominal tenderness.   Musculoskeletal:         General: Normal range of motion.      Cervical back: Normal range of motion and neck supple.   Skin:     General: Skin is warm.   Neurological:      Mental Status: He is alert and oriented to person, place, and time.       Sensory: No sensory deficit.      Deep Tendon Reflexes: Reflexes are normal and symmetric.             Most Recent EKG Results  Results for orders placed or performed during the hospital encounter of 09/04/23   EKG 12-lead    Collection Time: 09/04/23  9:14 PM    Narrative    Test Reason : R06.02,    Vent. Rate : 138 BPM     Atrial Rate : 258 BPM     P-R Int : 000 ms          QRS Dur : 092 ms      QT Int : 318 ms       P-R-T Axes : 000 084 015 degrees     QTc Int : 481 ms    Atrial fibrillation with rapid ventricular response  Incomplete right bundle branch block  Nonspecific ST and T wave abnormality  Abnormal ECG  When compared with ECG of 24-MAR-2022 14:48,  Significant changes have occurred  Confirmed by ANN-MARIE HOLLOWAY MD ([id number]) on 9/5/2023 4:17:59 PM    Referred By: ALBER   SELF           Confirmed By:ANN-MARIE HOLLOWAY MD       Most Recent Echocardiogram Results  Results for orders placed during the hospital encounter of 09/04/23    Echo    Interpretation Summary    Left Ventricle: The left ventricle is normal in size. Normal wall motion. There is normal systolic function with a visually estimated ejection fraction of 55 - 60%. There is normal diastolic function.    Right Ventricle: Normal right ventricular cavity size. Systolic function is normal.    IVC/SVC: Normal venous pressure at 3 mmHg.      Most Recent Nuclear Stress Test Results  Results for orders placed during the hospital encounter of 12/09/20    Nuclear Stress - Cardiology Interpreted    Interpretation Summary    Normal myocardial perfusion scan. There is no evidence of myocardial ischemia or infarction.    There is a  mild intensity fixed defect in the inferior wall of the left ventricle secondary to diaphragm attenuation.    Gated perfusion images showed an ejection fraction of 60%    The EKG portion of this study is negative for ischemia.    Arrhythmias during stress: PVCs.    There are no prior studies for comparison.      Most  Recent Cardiac PET Stress Test Results  No results found for this or any previous visit.      Most Recent Cardiovascular Angiogram results  Results for orders placed during the hospital encounter of 02/15/21    Cardiac catheterization    Conclusion  · Significant PAD as described in the text  · Successful PTA and stenting of the lesion in the proximal to mid SFA    The procedure log was documented by No documenter listed and verified by Jl Neil MD.    Date: 2/15/2021  Time: 8:12 AM    Angiographic findings:  Distal abdominal aorta:  This is a medium-sized vessel free of significant disease.    Right lower extremity:  The common iliac is a medium-sized vessel with luminal irregularities but no critical lesions.  The external internal iliacs appear patent.  The common femoral is a medium-sized vessel with luminal irregularities but no critical lesions, mildly calcified.  The profunda femoris appears patent.  The SFA is a medium-sized vessel mildly calcified, with luminal irregularities but no critical lesions.  The popliteal artery is a medium-sized vessel with a 760-70 percent stenosis in the distal segment and there is 1-2 vessel runoff down to the foot.    Left lower extremity:  The common iliac is a medium-sized vessel luminal irregularities.  The external and internal iliacs appear patent, the common femoral artery is medium-sized vessel with luminal irregularities, mildly calcified, free of significant disease.  The profunda femoris appears patent.  The SFA is a medium-sized vessel, mildly calcified, with a 90 percent stenosis in the proximal to mid segment.  The popliteal artery appears patent at the is a 2 vessel runoff down to the foot.    Intervention:  By using a 6 x 40 millimeter stent, the 90 percent stenosis of the proximal to mid SFA was successfully angioplastied and stented with 0 percent residual and brisk flow in distal vessel.      Other Most Recent Cardiology Results  Results for  orders placed during the hospital encounter of 09/04/23    CARDIAC MONITORING STRIPS      Labs reviewed    Assessment:       1. Paroxysmal atrial fibrillation    2. Diastolic congestive heart failure, unspecified HF chronicity    3. Morbid (severe) obesity due to excess calories    4. PVD (peripheral vascular disease)    5. Essential hypertension         Plan:   Will schedule cardiac catheterization to rule out coronary artery disease.  If angiogram negative will refer to GI for possible scope to rule out any source of bleeding and then after that we will get EP evaluation.      Continue:  Antiplatelet, ARB, Calcium blocker, Diuretic, and Statin  Regular exercise program  Weight loss  Low cholesterol diet

## 2024-04-24 RX ORDER — HYDROCHLOROTHIAZIDE 25 MG/1
25 TABLET ORAL DAILY
Qty: 90 TABLET | Refills: 1 | Status: CANCELLED | OUTPATIENT
Start: 2024-04-24 | End: 2025-04-24

## 2024-04-25 RX ORDER — HYDROCHLOROTHIAZIDE 25 MG/1
25 TABLET ORAL DAILY
Qty: 90 TABLET | Refills: 1 | OUTPATIENT
Start: 2024-04-25 | End: 2025-04-25

## 2024-04-29 DIAGNOSIS — I25.10 CORONARY ARTERY DISEASE, UNSPECIFIED VESSEL OR LESION TYPE, UNSPECIFIED WHETHER ANGINA PRESENT, UNSPECIFIED WHETHER NATIVE OR TRANSPLANTED HEART: Primary | ICD-10-CM

## 2024-05-02 ENCOUNTER — OFFICE VISIT (OUTPATIENT)
Dept: VASCULAR SURGERY | Facility: CLINIC | Age: 69
End: 2024-05-02
Payer: MEDICARE

## 2024-05-02 VITALS
HEIGHT: 69 IN | HEART RATE: 82 BPM | BODY MASS INDEX: 45.81 KG/M2 | SYSTOLIC BLOOD PRESSURE: 154 MMHG | WEIGHT: 309.31 LBS | DIASTOLIC BLOOD PRESSURE: 96 MMHG

## 2024-05-02 DIAGNOSIS — R09.89 BILATERAL CAROTID BRUITS: ICD-10-CM

## 2024-05-02 DIAGNOSIS — E11.9 DIABETES: ICD-10-CM

## 2024-05-02 DIAGNOSIS — I25.810 CORONARY ARTERY DISEASE INVOLVING AUTOLOGOUS ARTERY CORONARY BYPASS GRAFT: ICD-10-CM

## 2024-05-02 DIAGNOSIS — E66.01 CLASS 3 SEVERE OBESITY DUE TO EXCESS CALORIES WITH BODY MASS INDEX (BMI) OF 45.0 TO 49.9 IN ADULT, UNSPECIFIED WHETHER SERIOUS COMORBIDITY PRESENT: ICD-10-CM

## 2024-05-02 DIAGNOSIS — I25.118 CORONARY ARTERY DISEASE INVOLVING NATIVE CORONARY ARTERY OF NATIVE HEART WITH OTHER FORM OF ANGINA PECTORIS: Primary | ICD-10-CM

## 2024-05-02 DIAGNOSIS — I48.19 PERSISTENT ATRIAL FIBRILLATION: ICD-10-CM

## 2024-05-02 PROCEDURE — 1126F AMNT PAIN NOTED NONE PRSNT: CPT | Mod: CPTII,S$GLB,, | Performed by: THORACIC SURGERY (CARDIOTHORACIC VASCULAR SURGERY)

## 2024-05-02 PROCEDURE — 3080F DIAST BP >= 90 MM HG: CPT | Mod: CPTII,S$GLB,, | Performed by: THORACIC SURGERY (CARDIOTHORACIC VASCULAR SURGERY)

## 2024-05-02 PROCEDURE — 3008F BODY MASS INDEX DOCD: CPT | Mod: CPTII,S$GLB,, | Performed by: THORACIC SURGERY (CARDIOTHORACIC VASCULAR SURGERY)

## 2024-05-02 PROCEDURE — 99205 OFFICE O/P NEW HI 60 MIN: CPT | Mod: S$GLB,,, | Performed by: THORACIC SURGERY (CARDIOTHORACIC VASCULAR SURGERY)

## 2024-05-02 PROCEDURE — 4010F ACE/ARB THERAPY RXD/TAKEN: CPT | Mod: CPTII,S$GLB,, | Performed by: THORACIC SURGERY (CARDIOTHORACIC VASCULAR SURGERY)

## 2024-05-02 PROCEDURE — 99999 PR PBB SHADOW E&M-EST. PATIENT-LVL III: CPT | Mod: PBBFAC,,, | Performed by: THORACIC SURGERY (CARDIOTHORACIC VASCULAR SURGERY)

## 2024-05-02 PROCEDURE — 3077F SYST BP >= 140 MM HG: CPT | Mod: CPTII,S$GLB,, | Performed by: THORACIC SURGERY (CARDIOTHORACIC VASCULAR SURGERY)

## 2024-05-02 PROCEDURE — 1159F MED LIST DOCD IN RCRD: CPT | Mod: CPTII,S$GLB,, | Performed by: THORACIC SURGERY (CARDIOTHORACIC VASCULAR SURGERY)

## 2024-05-02 RX ORDER — MUPIROCIN 20 MG/G
OINTMENT TOPICAL
Status: CANCELLED | OUTPATIENT
Start: 2024-05-02

## 2024-05-02 RX ORDER — METFORMIN HYDROCHLORIDE 500 MG/1
500 TABLET ORAL 2 TIMES DAILY WITH MEALS
COMMUNITY

## 2024-05-02 RX ORDER — METOPROLOL SUCCINATE 25 MG/1
25 TABLET, EXTENDED RELEASE ORAL DAILY
Qty: 90 TABLET | Refills: 3 | Status: SHIPPED | OUTPATIENT
Start: 2024-05-02 | End: 2025-05-02

## 2024-05-02 RX ORDER — CEFAZOLIN SODIUM 2 G/50ML
2 SOLUTION INTRAVENOUS
Status: CANCELLED | OUTPATIENT
Start: 2024-05-02

## 2024-05-02 RX ORDER — CHLORHEXIDINE GLUCONATE ORAL RINSE 1.2 MG/ML
10 SOLUTION DENTAL
Status: CANCELLED | OUTPATIENT
Start: 2024-05-02
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
## 2024-05-14 ENCOUNTER — HOSPITAL ENCOUNTER (OUTPATIENT)
Dept: RADIOLOGY | Facility: HOSPITAL | Age: 69
Discharge: HOME | End: 2024-05-14
Attending: THORACIC SURGERY (CARDIOTHORACIC VASCULAR SURGERY)
Payer: MEDICARE

## 2024-05-14 ENCOUNTER — HOSPITAL ENCOUNTER (OUTPATIENT)
Dept: CARDIOLOGY | Facility: HOSPITAL | Age: 69
Discharge: HOME | End: 2024-05-14
Attending: THORACIC SURGERY (CARDIOTHORACIC VASCULAR SURGERY)
Payer: MEDICARE

## 2024-05-14 VITALS — WEIGHT: 309 LBS | BODY MASS INDEX: 45.77 KG/M2 | HEIGHT: 69 IN

## 2024-05-14 DIAGNOSIS — R09.89 BILATERAL CAROTID BRUITS: ICD-10-CM

## 2024-05-14 DIAGNOSIS — I25.118 CORONARY ARTERY DISEASE INVOLVING NATIVE CORONARY ARTERY OF NATIVE HEART WITH OTHER FORM OF ANGINA PECTORIS: ICD-10-CM

## 2024-05-14 DIAGNOSIS — I48.19 PERSISTENT ATRIAL FIBRILLATION: ICD-10-CM

## 2024-05-14 LAB
ASCENDING AORTA: 3.67 CM
AV INDEX (PROSTH): 0.77
AV MEAN GRADIENT: 3 MMHG
AV PEAK GRADIENT: 5 MMHG
AV VALVE AREA BY VELOCITY RATIO: 3.27 CM²
AV VALVE AREA: 3.22 CM²
AV VELOCITY RATIO: 0.78
BSA FOR ECHO PROCEDURE: 2.61 M2
DOP CALC AO PEAK VEL: 1.14 M/S
DOP CALC AO VTI: 23.8 CM
DOP CALC LVOT AREA: 4.2 CM2
DOP CALC LVOT DIAMETER: 2.31 CM
DOP CALC LVOT PEAK VEL: 0.89 M/S
DOP CALC LVOT STROKE VOLUME: 76.66 CM3
DOP CALCLVOT PEAK VEL VTI: 18.3 CM
IVRT: 79.92 MSEC
LEFT ATRIUM VOLUME INDEX MOD: 24.6 ML/M2
LEFT ATRIUM VOLUME MOD: 61.35 CM3
LVOT MG: 1.32 MMHG
LVOT MV: 0.52 CM/S
PISA TR MAX VEL: 1.92 M/S
RA PRESSURE ESTIMATED: 3 MMHG
RV TB RVSP: 5 MMHG
RV TISSUE DOPPLER FREE WALL SYSTOLIC VELOCITY 1 (APICAL 4 CHAMBER VIEW): 13.44 CM/S
SINUS: 3.15 CM
STJ: 2.76 CM
TDI LATERAL: 0.15 M/S
TDI SEPTAL: 0.13 M/S
TDI: 0.14 M/S
TR MAX PG: 15 MMHG
TRICUSPID ANNULAR PLANE SYSTOLIC EXCURSION: 2.38 CM
TV REST PULMONARY ARTERY PRESSURE: 18 MMHG

## 2024-05-14 PROCEDURE — 93306 TTE W/DOPPLER COMPLETE: CPT | Mod: 26,HCNC,, | Performed by: INTERNAL MEDICINE

## 2024-05-14 PROCEDURE — C8929 TTE W OR WO FOL WCON,DOPPLER: HCPCS | Mod: HCNC,PO

## 2024-05-14 PROCEDURE — 25500020 PHARM REV CODE 255: Mod: HCNC,PO | Performed by: THORACIC SURGERY (CARDIOTHORACIC VASCULAR SURGERY)

## 2024-05-14 PROCEDURE — 93880 EXTRACRANIAL BILAT STUDY: CPT | Mod: 26,HCNC,, | Performed by: RADIOLOGY

## 2024-05-14 PROCEDURE — 93880 EXTRACRANIAL BILAT STUDY: CPT | Mod: TC,HCNC,PO

## 2024-05-14 RX ADMIN — HUMAN ALBUMIN MICROSPHERES AND PERFLUTREN 0.11 MG: 10; .22 INJECTION, SOLUTION INTRAVENOUS at 09:05

## 2024-05-16 PROBLEM — I25.10 CORONARY ARTERY DISEASE INVOLVING NATIVE CORONARY ARTERY OF NATIVE HEART: Status: ACTIVE | Noted: 2024-05-16

## 2024-05-20 PROBLEM — Z95.1 HX OF CABG: Status: ACTIVE | Noted: 2024-05-20

## 2024-05-27 PROCEDURE — G0180 MD CERTIFICATION HHA PATIENT: HCPCS | Mod: ,,, | Performed by: THORACIC SURGERY (CARDIOTHORACIC VASCULAR SURGERY)

## 2024-05-30 ENCOUNTER — TELEPHONE (OUTPATIENT)
Dept: VASCULAR SURGERY | Facility: CLINIC | Age: 69
End: 2024-05-30
Payer: MEDICARE

## 2024-05-30 NOTE — TELEPHONE ENCOUNTER
Advised pt can take Simethicone OTC -  nurse verbalized understanding.   ----- Message from Violette Quarles sent at 5/30/2024 11:47 AM CDT -----  Type: Needs Medical Advice  Who Called:  [facility]  Best Call Back Number: [phone]  Additional Information:clarence  is calling the office pt was admitted Monday pt is having issues with gas and at night time gets so bad pt starts throwing up trying to see if its something can get over the counter.Please call back and advise.

## 2024-06-13 ENCOUNTER — OFFICE VISIT (OUTPATIENT)
Dept: VASCULAR SURGERY | Facility: CLINIC | Age: 69
End: 2024-06-13
Payer: MEDICARE

## 2024-06-13 ENCOUNTER — HOSPITAL ENCOUNTER (OUTPATIENT)
Dept: RADIOLOGY | Facility: HOSPITAL | Age: 69
Discharge: HOME | End: 2024-06-13
Attending: THORACIC SURGERY (CARDIOTHORACIC VASCULAR SURGERY)
Payer: MEDICARE

## 2024-06-13 VITALS
BODY MASS INDEX: 40.13 KG/M2 | SYSTOLIC BLOOD PRESSURE: 130 MMHG | WEIGHT: 286.63 LBS | HEART RATE: 90 BPM | HEIGHT: 71 IN | DIASTOLIC BLOOD PRESSURE: 82 MMHG

## 2024-06-13 DIAGNOSIS — Z95.1 HX OF CABG: ICD-10-CM

## 2024-06-13 DIAGNOSIS — Z95.1 HX OF CABG: Primary | ICD-10-CM

## 2024-06-13 DIAGNOSIS — E78.5 HYPERLIPIDEMIA, UNSPECIFIED HYPERLIPIDEMIA TYPE: ICD-10-CM

## 2024-06-13 DIAGNOSIS — R73.03 PREDIABETES: ICD-10-CM

## 2024-06-13 DIAGNOSIS — Z98.890 HX OF MAZE PROCEDURE: ICD-10-CM

## 2024-06-13 DIAGNOSIS — I48.91 ATRIAL FIBRILLATION, UNSPECIFIED TYPE: Primary | ICD-10-CM

## 2024-06-13 PROCEDURE — 1126F AMNT PAIN NOTED NONE PRSNT: CPT | Mod: HCNC,CPTII,S$GLB, | Performed by: THORACIC SURGERY (CARDIOTHORACIC VASCULAR SURGERY)

## 2024-06-13 PROCEDURE — 3075F SYST BP GE 130 - 139MM HG: CPT | Mod: HCNC,CPTII,S$GLB, | Performed by: THORACIC SURGERY (CARDIOTHORACIC VASCULAR SURGERY)

## 2024-06-13 PROCEDURE — 3079F DIAST BP 80-89 MM HG: CPT | Mod: HCNC,CPTII,S$GLB, | Performed by: THORACIC SURGERY (CARDIOTHORACIC VASCULAR SURGERY)

## 2024-06-13 PROCEDURE — 4010F ACE/ARB THERAPY RXD/TAKEN: CPT | Mod: HCNC,CPTII,S$GLB, | Performed by: THORACIC SURGERY (CARDIOTHORACIC VASCULAR SURGERY)

## 2024-06-13 PROCEDURE — 99999 PR PBB SHADOW E&M-EST. PATIENT-LVL III: CPT | Mod: PBBFAC,HCNC,, | Performed by: THORACIC SURGERY (CARDIOTHORACIC VASCULAR SURGERY)

## 2024-06-13 PROCEDURE — 3051F HG A1C>EQUAL 7.0%<8.0%: CPT | Mod: HCNC,CPTII,S$GLB, | Performed by: THORACIC SURGERY (CARDIOTHORACIC VASCULAR SURGERY)

## 2024-06-13 PROCEDURE — 71046 X-RAY EXAM CHEST 2 VIEWS: CPT | Mod: TC,HCNC,FY,PO

## 2024-06-13 PROCEDURE — 71046 X-RAY EXAM CHEST 2 VIEWS: CPT | Mod: 26,HCNC,, | Performed by: RADIOLOGY

## 2024-06-13 PROCEDURE — 1159F MED LIST DOCD IN RCRD: CPT | Mod: HCNC,CPTII,S$GLB, | Performed by: THORACIC SURGERY (CARDIOTHORACIC VASCULAR SURGERY)

## 2024-06-13 PROCEDURE — 99024 POSTOP FOLLOW-UP VISIT: CPT | Mod: HCNC,S$GLB,, | Performed by: THORACIC SURGERY (CARDIOTHORACIC VASCULAR SURGERY)

## 2024-06-13 RX ORDER — ATORVASTATIN CALCIUM 10 MG/1
10 TABLET, FILM COATED ORAL DAILY
Qty: 90 TABLET | Refills: 3 | Status: SHIPPED | OUTPATIENT
Start: 2024-06-13 | End: 2025-06-13

## 2024-06-13 RX ORDER — AMIODARONE HYDROCHLORIDE 200 MG/1
200 TABLET ORAL 2 TIMES DAILY
Qty: 60 TABLET | Refills: 1 | Status: SHIPPED | OUTPATIENT
Start: 2024-06-13 | End: 2025-06-13

## 2024-06-13 NOTE — PROGRESS NOTES
6/13/2024...    The patient is status post CABG/modified Maze/ligation of the left atrial appendage 05/20/2024.  He had a remarkably smooth and progressive postoperative course.  On the 5th postoperative day, however, he developed atrial fibrillation.  The atrial fibrillation was short-lived and converted to sinus with intravenous amiodarone.  He was discharged home with a 30 day supply of amiodarone.    The patient visits the office today accompanied by his sister.  He looks remarkably well!  He has considerably less anasarca and his wounds are all healing fairly well.    The patient currently takes Lasix twice daily.  He has residual right lower extremity edema from the vein harvest site.  The left lower extremity is less edematous.  We will continue the twice daily Lasix.    The patient's activity level has been progressive.  He even did 5 minutes on his elliptical device yesterday!    On physical exam, the sternotomy wound is healing remarkably well.  It is clean and dry without erythema or drainage.  The bone is solid.  The chest tube sites are healed.  The right lower extremity EVH sites are almost completely healed.  The mid-thigh has superficial skin separation with a fibrinous base.  I have encouraged the patient to keep this wound dressed.    CXR today is clear, EKG today is poor quality but appears to be sinus rhythm with premature atrial contractions.    We will continue twice daily diuretics  I will continue the amiodarone for now  His blood pressure log demonstrates excellent blood pressure control-we have not resumed his valsartan.  The patient is due to see Dr. Neil 07/08/2024.  I will see the patient back in 6-8 weeks for a final checkup-we will repeat the EKG at that time

## 2024-06-14 RX ORDER — METFORMIN HYDROCHLORIDE 500 MG/1
500 TABLET, EXTENDED RELEASE ORAL
Qty: 90 TABLET | Refills: 3 | OUTPATIENT
Start: 2024-06-14 | End: 2025-06-14

## 2024-06-19 ENCOUNTER — PATIENT MESSAGE (OUTPATIENT)
Dept: VASCULAR SURGERY | Facility: CLINIC | Age: 69
End: 2024-06-19
Payer: MEDICARE

## 2024-06-19 ENCOUNTER — DOCUMENT SCAN (OUTPATIENT)
Dept: HOME HEALTH SERVICES | Facility: HOSPITAL | Age: 69
End: 2024-06-19
Payer: MEDICARE

## 2024-06-19 ENCOUNTER — TELEPHONE (OUTPATIENT)
Dept: VASCULAR SURGERY | Facility: CLINIC | Age: 69
End: 2024-06-19
Payer: MEDICARE

## 2024-06-19 NOTE — TELEPHONE ENCOUNTER
Pt called to report an area on his leg - Dr Roberts is aware - pt will attempt to send photo via My Chart.     ----- Message from Annabella Doyle LPN sent at 6/18/2024  2:31 PM CDT -----  Contact: pt    ----- Message -----  From: Silvana Oro  Sent: 6/18/2024   2:17 PM CDT  To: [facility]gron Staff    Type: Needs Medical Advice         Who Called: pt  Best Call Back Number:[phone]  Additional Information: Requesting a call back regarding  pt said the sore on his leg looks more reddish via his home health. Pt said it's not getting any better,   Please Advise- Thank you

## 2024-06-21 ENCOUNTER — EXTERNAL HOME HEALTH (OUTPATIENT)
Dept: HOME HEALTH SERVICES | Facility: HOSPITAL | Age: 69
End: 2024-06-21
Payer: MEDICARE

## 2024-06-26 DIAGNOSIS — R73.03 PREDIABETES: ICD-10-CM

## 2024-06-26 RX ORDER — METFORMIN HYDROCHLORIDE 500 MG/1
500 TABLET, EXTENDED RELEASE ORAL
Qty: 90 TABLET | Refills: 3 | Status: SHIPPED | OUTPATIENT
Start: 2024-06-26 | End: 2025-06-26

## 2024-07-08 ENCOUNTER — OFFICE VISIT (OUTPATIENT)
Dept: CARDIOLOGY | Facility: CLINIC | Age: 69
End: 2024-07-08
Payer: MEDICARE

## 2024-07-08 VITALS
BODY MASS INDEX: 40.4 KG/M2 | WEIGHT: 282.19 LBS | SYSTOLIC BLOOD PRESSURE: 122 MMHG | HEART RATE: 83 BPM | DIASTOLIC BLOOD PRESSURE: 79 MMHG | HEIGHT: 70 IN

## 2024-07-08 DIAGNOSIS — Z95.1 S/P CABG (CORONARY ARTERY BYPASS GRAFT): ICD-10-CM

## 2024-07-08 DIAGNOSIS — E66.01 MORBID (SEVERE) OBESITY DUE TO EXCESS CALORIES: ICD-10-CM

## 2024-07-08 DIAGNOSIS — I48.0 PAROXYSMAL ATRIAL FIBRILLATION: ICD-10-CM

## 2024-07-08 DIAGNOSIS — I25.10 CORONARY ARTERY DISEASE, UNSPECIFIED VESSEL OR LESION TYPE, UNSPECIFIED WHETHER ANGINA PRESENT, UNSPECIFIED WHETHER NATIVE OR TRANSPLANTED HEART: Primary | ICD-10-CM

## 2024-07-08 DIAGNOSIS — I48.91 ATRIAL FIBRILLATION, UNSPECIFIED TYPE: ICD-10-CM

## 2024-07-08 DIAGNOSIS — I25.118 CORONARY ARTERY DISEASE INVOLVING NATIVE CORONARY ARTERY OF NATIVE HEART WITH OTHER FORM OF ANGINA PECTORIS: ICD-10-CM

## 2024-07-08 DIAGNOSIS — I50.32 CHRONIC DIASTOLIC CONGESTIVE HEART FAILURE: ICD-10-CM

## 2024-07-08 DIAGNOSIS — I10 PRIMARY HYPERTENSION: ICD-10-CM

## 2024-07-08 DIAGNOSIS — I48.19 PERSISTENT ATRIAL FIBRILLATION: ICD-10-CM

## 2024-07-08 PROCEDURE — 1160F RVW MEDS BY RX/DR IN RCRD: CPT | Mod: HCNC,CPTII,S$GLB, | Performed by: INTERNAL MEDICINE

## 2024-07-08 PROCEDURE — 3051F HG A1C>EQUAL 7.0%<8.0%: CPT | Mod: HCNC,CPTII,S$GLB, | Performed by: INTERNAL MEDICINE

## 2024-07-08 PROCEDURE — 4010F ACE/ARB THERAPY RXD/TAKEN: CPT | Mod: HCNC,CPTII,S$GLB, | Performed by: INTERNAL MEDICINE

## 2024-07-08 PROCEDURE — 3078F DIAST BP <80 MM HG: CPT | Mod: HCNC,CPTII,S$GLB, | Performed by: INTERNAL MEDICINE

## 2024-07-08 PROCEDURE — 3008F BODY MASS INDEX DOCD: CPT | Mod: HCNC,CPTII,S$GLB, | Performed by: INTERNAL MEDICINE

## 2024-07-08 PROCEDURE — 1101F PT FALLS ASSESS-DOCD LE1/YR: CPT | Mod: HCNC,CPTII,S$GLB, | Performed by: INTERNAL MEDICINE

## 2024-07-08 PROCEDURE — 3288F FALL RISK ASSESSMENT DOCD: CPT | Mod: HCNC,CPTII,S$GLB, | Performed by: INTERNAL MEDICINE

## 2024-07-08 PROCEDURE — 1159F MED LIST DOCD IN RCRD: CPT | Mod: HCNC,CPTII,S$GLB, | Performed by: INTERNAL MEDICINE

## 2024-07-08 PROCEDURE — 3074F SYST BP LT 130 MM HG: CPT | Mod: HCNC,CPTII,S$GLB, | Performed by: INTERNAL MEDICINE

## 2024-07-08 PROCEDURE — 99999 PR PBB SHADOW E&M-EST. PATIENT-LVL III: CPT | Mod: PBBFAC,HCNC,, | Performed by: INTERNAL MEDICINE

## 2024-07-08 PROCEDURE — 99214 OFFICE O/P EST MOD 30 MIN: CPT | Mod: HCNC,S$GLB,, | Performed by: INTERNAL MEDICINE

## 2024-07-08 RX ORDER — METOPROLOL SUCCINATE 50 MG/1
50 TABLET, EXTENDED RELEASE ORAL DAILY
Qty: 90 TABLET | Refills: 3 | Status: SHIPPED | OUTPATIENT
Start: 2024-07-08 | End: 2025-07-08

## 2024-07-08 RX ORDER — AMIODARONE HYDROCHLORIDE 200 MG/1
200 TABLET ORAL DAILY
Qty: 31 TABLET | Refills: 11 | Status: SHIPPED | OUTPATIENT
Start: 2024-07-08 | End: 2025-07-08

## 2024-07-08 NOTE — PROGRESS NOTES
Subjective:    Patient ID:  Bossman Duke is a 68 y.o. male who presents for follow-up of CABG    HPI  He had three-vessel bypass on 05/20/2024 (lima to LAD, SVG to om 1 and SVG to OM2).  He comes for follow up with no major problems, no chest pain, no shortness of breath.  He has not started cardiac rehab yet.    Blood pressure normal at home.    No palpitations.  No syncope or near-syncope.  Slowly getting better      Review of Systems   Constitutional: Negative for decreased appetite, malaise/fatigue, weight gain and weight loss.   Cardiovascular:  Negative for chest pain, dyspnea on exertion, leg swelling, palpitations and syncope.   Respiratory:  Negative for cough and shortness of breath.    Gastrointestinal: Negative.    Neurological:  Negative for weakness.   All other systems reviewed and are negative.       Objective:      Physical Exam  Vitals and nursing note reviewed.   Constitutional:       Appearance: Normal appearance. He is well-developed.   HENT:      Head: Normocephalic.   Eyes:      Pupils: Pupils are equal, round, and reactive to light.   Neck:      Thyroid: No thyromegaly.      Vascular: No carotid bruit or JVD.   Cardiovascular:      Rate and Rhythm: Normal rate and regular rhythm.      Chest Wall: PMI is not displaced.      Pulses: Normal pulses and intact distal pulses.      Heart sounds: Normal heart sounds. No murmur heard.     No gallop.   Pulmonary:      Effort: Pulmonary effort is normal.      Breath sounds: Normal breath sounds.   Abdominal:      Palpations: Abdomen is soft. There is no mass.      Tenderness: There is no abdominal tenderness.   Musculoskeletal:         General: Normal range of motion.      Cervical back: Normal range of motion and neck supple.   Skin:     General: Skin is warm.   Neurological:      Mental Status: He is alert and oriented to person, place, and time.      Sensory: No sensory deficit.      Deep Tendon Reflexes: Reflexes are normal and symmetric.              Most Recent EKG Results  Results for orders placed or performed during the hospital encounter of 05/20/24   EKG 12-lead    Collection Time: 06/13/24  2:44 PM   Result Value Ref Range    QRS Duration 86 ms    [facility] QTC Calculation 487 ms    Narrative    Test Reason : I49.9,    Vent. Rate : 093 BPM     Atrial Rate : 093 BPM     P-R Int : 186 ms          QRS Dur : 086 ms      QT Int : 392 ms       P-R-T Axes : 081 056 -06 degrees     QTc Int : 487 ms    Sinus rhythm with Premature atrial complexes  Nonspecific ST and T wave abnormality  Abnormal ECG  When compared with ECG of 21-MAY-2024 07:53,  Premature ventricular complexes are no longer Present  Premature atrial complexes are now Present  ST now depressed in Anterior leads  Nonspecific T wave abnormality now evident in Anterior-lateral leads  Confirmed by Maricruz Mccall MD ([id number]) on 6/13/2024 3:49:31 PM    Referred By:             Confirmed By:Maricruz Mccall MD       Most Recent Echocardiogram Results  Results for orders placed during the hospital encounter of 05/20/24    Transesophageal echo (JOHN) with possible cardioversion    Interpretation Summary    Left Ventricle: The left ventricle is normal in size. There is normal systolic function with a visually estimated ejection fraction of 55 - 60%.    Right Ventricle: Normal right ventricular cavity size. Systolic function is normal.    Left Atrium: Left atrium is mildly dilated.      Most Recent Nuclear Stress Test Results  Results for orders placed during the hospital encounter of 12/09/20    Nuclear Stress - Cardiology Interpreted    Interpretation Summary    Normal myocardial perfusion scan. There is no evidence of myocardial ischemia or infarction.    There is a  mild intensity fixed defect in the inferior wall of the left ventricle secondary to diaphragm attenuation.    Gated perfusion images showed an ejection fraction of 60%    The EKG portion of this study is negative for ischemia.    Arrhythmias during stress:  PVCs.    There are no prior studies for comparison.      Most Recent Cardiac PET Stress Test Results  No results found for this or any previous visit.      Most Recent Cardiovascular Angiogram results  Results for orders placed during the hospital encounter of 04/24/24    Cardiac catheterization    Conclusion    Significant two-vessel CAD as described in the text    The left ventricular systolic function was normal.    The left ventricular end diastolic pressure was elevated.    The procedure log was documented by No documenter listed and verified by Jl Neil MD.    Date: 4/24/2024  Time: 12:16 PM      Other Most Recent Cardiology Results  Results for orders placed during the hospital encounter of 05/20/24    Perfusion Record      Labs reviewed    Assessment:       1. Coronary artery disease, unspecified vessel or lesion type, unspecified whether angina present, unspecified whether native or transplanted heart    2. S/P CABG (coronary artery bypass graft)    3. Paroxysmal atrial fibrillation    4. Primary hypertension    5. Chronic diastolic congestive heart failure    6. Morbid (severe) obesity due to excess calories    7. Atrial fibrillation, unspecified type    8. 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 Persistent atrial fibrillation         Plan:   Decrease amiodarone to 200 mg p.o. daily.    Increase Toprol-XL to 50 mg p.o. daily  Labs this week and will call with results.      Continue:  Antiplatelet, ASA, Diuretic, and Statin  Regular exercise program  Weight loss  Low cholesterol diet    Follow-up in six-month with Nelly Mascorro

## 2024-07-11 ENCOUNTER — LAB VISIT (OUTPATIENT)
Dept: LAB | Facility: HOSPITAL | Age: 69
End: 2024-07-11
Attending: INTERNAL MEDICINE
Payer: MEDICARE

## 2024-07-11 ENCOUNTER — TELEPHONE (OUTPATIENT)
Dept: VASCULAR SURGERY | Facility: CLINIC | Age: 69
End: 2024-07-11
Payer: MEDICARE

## 2024-07-11 DIAGNOSIS — I50.32 CHRONIC DIASTOLIC CONGESTIVE HEART FAILURE: ICD-10-CM

## 2024-07-11 DIAGNOSIS — I48.0 PAROXYSMAL ATRIAL FIBRILLATION: ICD-10-CM

## 2024-07-11 DIAGNOSIS — E66.01 MORBID (SEVERE) OBESITY DUE TO EXCESS CALORIES: ICD-10-CM

## 2024-07-11 DIAGNOSIS — Z95.1 S/P CABG (CORONARY ARTERY BYPASS GRAFT): ICD-10-CM

## 2024-07-11 DIAGNOSIS — I10 PRIMARY HYPERTENSION: ICD-10-CM

## 2024-07-11 DIAGNOSIS — I25.10 CORONARY ARTERY DISEASE, UNSPECIFIED VESSEL OR LESION TYPE, UNSPECIFIED WHETHER ANGINA PRESENT, UNSPECIFIED WHETHER NATIVE OR TRANSPLANTED HEART: ICD-10-CM

## 2024-07-11 LAB
ALBUMIN SERPL BCP-MCNC: 3.3 G/DL (ref 3.5–5.2)
ALP SERPL-CCNC: 126 U/L (ref 55–135)
ALT SERPL W/O P-5'-P-CCNC: 22 U/L (ref 10–44)
ANION GAP SERPL CALC-SCNC: 9 MMOL/L (ref 8–16)
AST SERPL-CCNC: 20 U/L (ref 10–40)
BASOPHILS # BLD AUTO: 0.16 K/UL (ref 0–0.2)
BASOPHILS NFR BLD: 1.7 % (ref 0–1.9)
BILIRUB DIRECT SERPL-MCNC: 0.2 MG/DL (ref 0.1–0.3)
BILIRUB SERPL-MCNC: 0.5 MG/DL (ref 0.1–1)
BNP SERPL-MCNC: 71 PG/ML (ref 0–99)
BUN SERPL-MCNC: 17 MG/DL (ref 8–23)
CALCIUM SERPL-MCNC: 9.8 MG/DL (ref 8.7–10.5)
CHLORIDE SERPL-SCNC: 104 MMOL/L (ref 95–110)
CHOLEST SERPL-MCNC: 137 MG/DL (ref 120–199)
CHOLEST/HDLC SERPL: 3 {RATIO} (ref 2–5)
CO2 SERPL-SCNC: 27 MMOL/L (ref 23–29)
CREAT SERPL-MCNC: 1.2 MG/DL (ref 0.5–1.4)
DIFFERENTIAL METHOD BLD: ABNORMAL
EOSINOPHIL # BLD AUTO: 0.4 K/UL (ref 0–0.5)
EOSINOPHIL NFR BLD: 4.2 % (ref 0–8)
ERYTHROCYTE [DISTWIDTH] IN BLOOD BY AUTOMATED COUNT: 14.2 % (ref 11.5–14.5)
EST. GFR  (NO RACE VARIABLE): >60 ML/MIN/1.73 M^2
GLUCOSE SERPL-MCNC: 120 MG/DL (ref 70–110)
HCT VFR BLD AUTO: 38.8 % (ref 40–54)
HDLC SERPL-MCNC: 46 MG/DL (ref 40–75)
HDLC SERPL: 33.6 % (ref 20–50)
HGB BLD-MCNC: 12 G/DL (ref 14–18)
IMM GRANULOCYTES # BLD AUTO: 0.06 K/UL (ref 0–0.04)
IMM GRANULOCYTES NFR BLD AUTO: 0.6 % (ref 0–0.5)
LDLC SERPL CALC-MCNC: 69.4 MG/DL (ref 63–159)
LYMPHOCYTES # BLD AUTO: 2.9 K/UL (ref 1–4.8)
LYMPHOCYTES NFR BLD: 31 % (ref 18–48)
MCH RBC QN AUTO: 29.7 PG (ref 27–31)
MCHC RBC AUTO-ENTMCNC: 30.9 G/DL (ref 32–36)
MCV RBC AUTO: 96 FL (ref 82–98)
MONOCYTES # BLD AUTO: 0.9 K/UL (ref 0.3–1)
MONOCYTES NFR BLD: 9.1 % (ref 4–15)
NEUTROPHILS # BLD AUTO: 5 K/UL (ref 1.8–7.7)
NEUTROPHILS NFR BLD: 53.4 % (ref 38–73)
NONHDLC SERPL-MCNC: 91 MG/DL
NRBC BLD-RTO: 0 /100 WBC
PLATELET # BLD AUTO: 304 K/UL (ref 150–450)
PMV BLD AUTO: 10.7 FL (ref 9.2–12.9)
POTASSIUM SERPL-SCNC: 4.4 MMOL/L (ref 3.5–5.1)
PROT SERPL-MCNC: 7.2 G/DL (ref 6–8.4)
RBC # BLD AUTO: 4.04 M/UL (ref 4.6–6.2)
SODIUM SERPL-SCNC: 140 MMOL/L (ref 136–145)
T4 FREE SERPL-MCNC: 0.95 NG/DL (ref 0.71–1.51)
TRIGL SERPL-MCNC: 108 MG/DL (ref 30–150)
TSH SERPL DL<=0.005 MIU/L-ACNC: 9.42 UIU/ML (ref 0.4–4)
WBC # BLD AUTO: 9.42 K/UL (ref 3.9–12.7)

## 2024-07-11 PROCEDURE — 36415 COLL VENOUS BLD VENIPUNCTURE: CPT | Mod: HCNC,PO | Performed by: INTERNAL MEDICINE

## 2024-07-11 PROCEDURE — 80076 HEPATIC FUNCTION PANEL: CPT | Mod: HCNC | Performed by: INTERNAL MEDICINE

## 2024-07-11 PROCEDURE — 83880 ASSAY OF NATRIURETIC PEPTIDE: CPT | Mod: HCNC | Performed by: INTERNAL MEDICINE

## 2024-07-11 PROCEDURE — 84439 ASSAY OF FREE THYROXINE: CPT | Mod: HCNC | Performed by: INTERNAL MEDICINE

## 2024-07-11 PROCEDURE — 85025 COMPLETE CBC W/AUTO DIFF WBC: CPT | Mod: HCNC | Performed by: INTERNAL MEDICINE

## 2024-07-11 PROCEDURE — 80048 BASIC METABOLIC PNL TOTAL CA: CPT | Mod: HCNC | Performed by: INTERNAL MEDICINE

## 2024-07-11 PROCEDURE — 80061 LIPID PANEL: CPT | Mod: HCNC | Performed by: INTERNAL MEDICINE

## 2024-07-11 PROCEDURE — 84443 ASSAY THYROID STIM HORMONE: CPT | Mod: HCNC | Performed by: INTERNAL MEDICINE

## 2024-07-11 NOTE — TELEPHONE ENCOUNTER
Spoke with pt reschedule 8/8 pt verbalized understanding      ----- Message from Violette Quarles sent at 7/11/2024  4:32 PM CDT -----  Type: Needs Medical Advice  Who Called:  pt  Best Call Back Number: [phone]    Additional Information: Pt is calling the office returning call back to the nurse.Please call back and advise.

## 2024-07-17 ENCOUNTER — DOCUMENT SCAN (OUTPATIENT)
Dept: HOME HEALTH SERVICES | Facility: HOSPITAL | Age: 69
End: 2024-07-17
Payer: MEDICARE

## 2024-08-02 ENCOUNTER — DOCUMENT SCAN (OUTPATIENT)
Dept: HOME HEALTH SERVICES | Facility: HOSPITAL | Age: 69
End: 2024-08-02
Payer: MEDICARE

## 2024-08-07 ENCOUNTER — TELEPHONE (OUTPATIENT)
Dept: VASCULAR SURGERY | Facility: CLINIC | Age: 69
End: 2024-08-07
Payer: MEDICARE

## 2024-08-29 ENCOUNTER — OFFICE VISIT (OUTPATIENT)
Dept: VASCULAR SURGERY | Facility: CLINIC | Age: 69
End: 2024-08-29
Payer: MEDICARE

## 2024-08-29 VITALS
DIASTOLIC BLOOD PRESSURE: 76 MMHG | HEART RATE: 68 BPM | HEIGHT: 70 IN | SYSTOLIC BLOOD PRESSURE: 112 MMHG | WEIGHT: 295.19 LBS | BODY MASS INDEX: 42.26 KG/M2

## 2024-08-29 DIAGNOSIS — Z95.1 HX OF CABG: Primary | ICD-10-CM

## 2024-08-29 DIAGNOSIS — Z95.1 S/P CABG (CORONARY ARTERY BYPASS GRAFT): ICD-10-CM

## 2024-08-29 DIAGNOSIS — I48.0 PAROXYSMAL ATRIAL FIBRILLATION: Primary | ICD-10-CM

## 2024-08-29 DIAGNOSIS — Z98.890 HISTORY OF MAZE PROCEDURE: ICD-10-CM

## 2024-08-29 PROCEDURE — 93010 ELECTROCARDIOGRAM REPORT: CPT | Mod: HCNC,S$GLB,, | Performed by: INTERNAL MEDICINE

## 2024-08-29 PROCEDURE — 93005 ELECTROCARDIOGRAM TRACING: CPT | Mod: HCNC,PO

## 2024-08-29 PROCEDURE — 99999 PR PBB SHADOW E&M-EST. PATIENT-LVL III: CPT | Mod: PBBFAC,HCNC,, | Performed by: THORACIC SURGERY (CARDIOTHORACIC VASCULAR SURGERY)

## 2024-08-29 RX ORDER — AMIODARONE HYDROCHLORIDE 200 MG/1
200 TABLET ORAL 2 TIMES DAILY
Qty: 60 TABLET | Refills: 11 | Status: SHIPPED | OUTPATIENT
Start: 2024-08-29 | End: 2025-08-29

## 2024-08-29 RX ORDER — POTASSIUM CHLORIDE 1500 MG/1
40 TABLET, EXTENDED RELEASE ORAL DAILY
Qty: 60 TABLET | Refills: 11 | Status: SHIPPED | OUTPATIENT
Start: 2024-08-29

## 2024-08-29 NOTE — PROGRESS NOTES
08/29/2024...    The patient is status post CABG/modified Maze/and ligation of the left atrial appendage 05/20/2024.  Postoperatively, the patient had transient atrial fibrillation.  He converted with amiodarone.  He was discharged on the 6th postoperative day.    Since being discharged, the patient has had frequent EKG checks-all of which have been normal sinus rhythm.  At his last visit, we even stopped the amiodarone.    The patient visits the office today for his final surgical checkup.  He is unaccompanied.    EKG today demonstrates atrial fibrillation with a ventricular response of 114.    The patient is asymptomatic from the atrial fibrillation.  He is unsure when he went into atrial fibrillation this time.    The patient complained of blistering on the lateral aspect of his right lower calf.  Surprisingly, he has less edema than previously.  He is currently taking Lasix 40 mg once daily.    On physical exam, breath sounds are clear and equal bilaterally.  The sternotomy wound has healed nicely.  The bone is solid.  He is in an irregularly irregular rhythm.  The chronic, brawny lower extremity edema is present.  The EVH site is healed.  There is a ruptured blister on the lateral aspect of his right lower leg.  There is no surrounding edema.    Today, I have instructed the patient to resume amiodarone 200 mg twice daily   I have prescribed potassium 40 meq daily-he will pick this up 09/04/2023  We will increase the Lasix back to 40 mg twice daily  I have made a referral to Dr. Puckett () for management of the atrial fibrillation.  Anticoagulation was discussed with the patient.  He prefers to stay on aspirin and Plavix therapy for now.  I will discharge him from the cardiac surgical clinic

## 2024-08-30 DIAGNOSIS — Z95.1 HX OF CABG: Primary | ICD-10-CM

## 2024-08-30 LAB
OHS QRS DURATION: 102 MS
OHS QTC CALCULATION: 512 MS

## 2024-09-26 PROBLEM — G95.9 CERVICAL MYELOPATHY: Status: ACTIVE | Noted: 2024-09-26

## 2024-10-01 ENCOUNTER — PATIENT OUTREACH (OUTPATIENT)
Dept: ADMINISTRATIVE | Facility: HOSPITAL | Age: 69
End: 2024-10-01
Payer: MEDICARE

## 2024-10-01 NOTE — PROGRESS NOTES
Population Health Chart Review & Patient Outreach Details      Additional [facility] Health Notes:               Updates Requested / Reviewed:      Updated Care Coordination Note         Health Maintenance Topics Overdue:      [facility] Score: 1     Eye Exam    Tetanus Vaccine  Shingles/Zoster Vaccine  RSV Vaccine                  Health Maintenance Topic(s) Outreach Outcomes & Actions Taken:    Eye Exam - Outreach Outcomes & Actions Taken  : Message sent to patient to schedule

## 2024-10-16 PROBLEM — I48.19 PERSISTENT ATRIAL FIBRILLATION: Status: ACTIVE | Noted: 2023-09-06

## 2024-10-16 PROBLEM — I48.4 ATYPICAL ATRIAL FLUTTER: Status: ACTIVE | Noted: 2024-10-16

## 2024-11-22 ENCOUNTER — PATIENT MESSAGE (OUTPATIENT)
Dept: PODIATRY | Facility: CLINIC | Age: 69
End: 2024-11-22
Payer: MEDICARE

## 2024-12-10 NOTE — PROGRESS NOTES
"    SUBJECTIVE:    Patient ID:  Bossman Duke is a 69 y.o. male who presents for evaluation of atrial fibrillation.       Medical history:  Persistent atrial fibrillation  Atypical AFL  CAD s/p CABG with MAZE + JT ligation (5/20/24)  PAD s/p PTA mid SFA 2021  HTN   COPD  Morbid obesity  Tobacco use    Cardiologist: Dr. Jon Neil    Patient was referred by Dr. Migdalia Roberts for AF management.  Patient was initially diagnosed with AF around 3/22 after being hospitalized with a pneumonia.  He was started on Xarelto and Cardizem at a time.  He had issues with bleeding from his gums, and oral anticoagulation was discontinued.    He had recurrent AF with RVR 9/23.  He was discharged in rate controlled AF on Eliquis.  Eventually, he D/C Eliquis due to GI and gum bleeding.    He was having LAWSON prompting LHC and eventual CABG x3.  Additionally, he underwent epicardial MAZE (isolation of all 4 pulmonary veins) procedure and left atrial appendage ligation (AtriClip).  Of note, the op report states that his PVs were "enormous".    Postoperatively, he had transient AF and started on amiodarone.  He was seen in clinic 08/29/2024 with recurrent AF with RVR > amio resumed. Anticoagulation was discussed but he preferred dual antiplatelet therapy    All ECGs in EMR reviewed.  With 1st ECG 9/23 showing atrial fibrillation with RVR.  Most recent ECG shows likely atypical flutter versus AT.    TTE 5/24:  Normal biventricular function.    Relevant labs: cr 1.2, TSH 4.3  Relevant EP Rx:  Amiodarone 200 mg b.i.d., Toprol-XL 50 q.day, aspirin, Plavix    12/10/2024  Reports some dyspnea on exertion.  Reports significant symptoms of shortness of breath and palpitations with his atrial arrhythmias.  Taking amiodarone twice a day  Reports compliance with CPAP    I personally reviewed the ECG/telemetry strip today. My interpretation is normal sinus rhythm at 76 beats per minute.  First-degree AV block.    Past Medical History:   Diagnosis " "Date    Anticoagulant long-term use     Arthritis     COPD (chronic obstructive pulmonary disease)     Coronary artery disease     Diabetes mellitus     Hypertension     PAD (peripheral artery disease)     PUD (peptic ulcer disease)     Sleep apnea     CPAP    Vertigo        Past Surgical History:   Procedure Laterality Date    ABLATION AND RECONSTRUCTION, CARDIAC ATRIUM, WITH LIMITED TISSUE ABLATION N/A 5/20/2024    Procedure: ABLATION AND RECONSTRUCTION, CARDIAC ATRIUM, WITH LIMITED TISSUE ABLATION;  Surgeon: Migdalia Roberts MD;  Location: [facility] OR;  Service: Cardiothoracic;  Laterality: N/A;    ANGIOGRAM, CORONARY, WITH LEFT HEART CATHETERIZATION  4/24/2024    Procedure: Left Heart Cath;  Surgeon: Jl Neil MD;  Location: [facility] CATH;  Service: Cardiology;;    APPENDECTOMY      CERVICAL SPINE SURGERY      CORONARY ARTERY BYPASS GRAFT (CABG) N/A 5/20/2024    Procedure: CORONARY ARTERY BYPASS GRAFT (CABG) x3;  Surgeon: Migdalia Roberts MD;  Location: [facility] OR;  Service: Cardiothoracic;  Laterality: N/A;    ECHOCARDIOGRAM,TRANSESOPHAGEAL N/A 5/20/2024    Procedure: Transesophageal echo (JOHN) intra-procedure log documentation;  Surgeon: Migdalia Roberts MD;  Location: [facility] OR;  Service: Cardiothoracic;  Laterality: N/A;    ENDOSCOPIC HARVEST OF VEIN Right 5/20/2024    Procedure: HARVEST-VEIN-ENDOVASCULAR;  Surgeon: Migdalia Roberts MD;  Location: [facility] OR;  Service: Cardiothoracic;  Laterality: Right;    EPIDURAL STEROID INJECTION INTO LUMBAR SPINE N/A 10/5/2022    Procedure: Injection-steroid-epidural-lumbar L3/4;  Surgeon: Linwood Aponte MD;  Location: [facility] OR;  Service: Pain Management;  Laterality: N/A;    EXCLUSION, LEFT ATRIAL APPENDAGE, OPEN, AS PART OF OPEN CHEST SURGERY Left 5/20/2024    Procedure: EXCLUSION, LEFT ATRIAL APPENDAGE, OPEN, AS PART OF OPEN CHEST SURGERY;  Surgeon: Migdalia Roberts MD;  Location: [facility] OR;  Service: Cardiothoracic;  Laterality: Left;    EYE SURGERY Left     "as  a child"    INJECTION " OF ANESTHETIC AGENT AROUND MEDIAL BRANCH NERVES INNERVATING LUMBAR FACET JOINT Bilateral 2021    Procedure: Block-nerve-medial branch-lumbar L3/4, L4/5, L5/S1;  Surgeon: Linwood Aponte MD;  Location: [facility] OR;  Service: Pain Management;  Laterality: Bilateral;    LUMBAR DISC SURGERY      PERCUTANEOUS TRANSLUMINAL ANGIOPLASTY N/A 2/15/2021    Procedure: Pta (Angioplasty, Percutaneous, Transluminal);  Surgeon: Jl Neil MD;  [facility] CATH;  Service: Cardiology;  Laterality: N/A;    RADIOFREQUENCY ABLATION Right 3/1/2021    Procedure: Radiofrequency Ablation L3/4, L4/5;  Surgeon: Linwood Aponte MD;  Location: [facility] OR;  Service: Pain Management;  Laterality: Right;    RADIOFREQUENCY ABLATION Left 3/10/2021    Procedure: Radiofrequency Ablation L3/4, L4/5;  Surgeon: Linwood Aponte MD;  Location: [facility] OR;  Service: Pain Management;  Laterality: Left;    RADIOFREQUENCY ABLATION OF LUMBAR MEDIAL BRANCH NERVE AT SINGLE LEVEL Bilateral 2022    Procedure: Radiofrequency Ablation, Nerve, Spinal, Lumbar, Medial Branch, L3/4, L4/5;  Surgeon: Linwood Aponte MD;  Location: [facility] OR;  Service: Pain Management;  Laterality: Bilateral;       Family History   Problem Relation Name Age of Onset    Diabetes Mother      Heart disease Mother      Heart disease Father      Glaucoma Neg Hx      Macular degeneration Neg Hx      Retinal detachment Neg Hx         Social History     Socioeconomic History    Marital status:    Tobacco Use    Smoking status: Former     Current packs/day: 0.00     Average packs/day: 2.0 packs/day for 56.0 years (112.0 ttl pk-yrs)     Types: Cigarettes     Start date:      Quit date:      Years since quittin.9    Smokeless tobacco: Never   Substance and Sexual Activity    Alcohol use: Yes     Alcohol/week: 2.0 standard drinks of alcohol     Types: 2 Cans of beer per week     Comment: every other week    Drug use: Never     Social Drivers of Health     Food Insecurity:  No Food Insecurity (5/21/2024)    Hunger Vital Sign     Worried About Running Out of Food in the Last Year: Never true     Ran Out of Food in the Last Year: Never true   Transportation Needs: No Transportation Needs (5/21/2024)    TRANSPORTATION NEEDS     Transportation : No   Housing Stability: Low Risk  (5/21/2024)    Housing Stability Vital Sign     Unable to Pay for Housing in the Last Year: No     Homeless in the Last Year: No       Review of patient's allergies indicates:  No Known Allergies    Review of Systems   Constitutional: Negative for chills and fever.   HENT:  Negative for congestion and hearing loss.    Eyes:  Negative for blurred vision and double vision.   Cardiovascular:         See HPI   Respiratory:  Positive for shortness of breath. Negative for cough and hemoptysis.    Endocrine: Negative for cold intolerance and heat intolerance.   Musculoskeletal:  Negative for joint pain and joint swelling.   Gastrointestinal:  Negative for abdominal pain, nausea and vomiting.   Genitourinary:  Negative for dysuria and hematuria.   Neurological:  Negative for focal weakness and headaches.   Psychiatric/Behavioral:  Negative for altered mental status.    All other systems reviewed and are negative.           OBJECTIVE:     Vitals:    12/12/24 0827   BP: (!) 161/91   Pulse: 76         BP Readings from Last 5 Encounters:   10/11/24 136/78   09/26/24 112/64   08/29/24 112/76   07/08/24 122/79   06/13/24 130/82        Physical Exam  Vitals and nursing note reviewed.   Constitutional:       General: He is not in acute distress.     Appearance: He is well-developed. He is not diaphoretic.   HENT:      Head: Normocephalic and atraumatic.   Eyes:      General: No scleral icterus.        Right eye: No discharge.         Left eye: No discharge.   Cardiovascular:      Rate and Rhythm: Normal rate and regular rhythm. No extrasystoles are present.     Pulses: Normal pulses and intact distal pulses.           Radial  pulses are 2+ on the right side and 2+ on the left side.      Heart sounds: Normal heart sounds, S1 normal and S2 normal. Heart sounds not distant. No opening snap. No murmur heard.     No friction rub. No gallop. No S3 or S4 sounds.   Pulmonary:      Effort: Pulmonary effort is normal. No respiratory distress.      Breath sounds: No wheezing or rales.   Abdominal:      General: Bowel sounds are normal. There is no distension.      Palpations: Abdomen is soft.      Tenderness: There is no abdominal tenderness.   Musculoskeletal:         General: No deformity. Normal range of motion.      Cervical back: Normal range of motion and neck supple.   Skin:     General: Skin is warm and dry.      Findings: No erythema or rash.   Neurological:      Mental Status: He is alert.             Current Outpatient Medications   Medication Instructions    amiodarone (PACERONE) 200 mg, Oral, 2 times daily    aspirin (ECOTRIN) 81 mg, Daily    atorvastatin (LIPITOR) 20 mg, Oral, Daily    budesonide (PULMICORT) 0.5 mg, Nebulization, 2 times daily, Controller    clopidogreL (PLAVIX) 75 mg, Oral, Daily    diclofenac sodium (VOLTAREN) 2 g, Topical (Top), 4 times daily    furosemide (LASIX) 40 mg, Oral, 2 times daily    levalbuterol (XOPENEX) 1.25 mg, Nebulization, Every 6 hours PRN, Rescue    metFORMIN (GLUCOPHAGE-XR) 500 mg, Oral, With breakfast    metoprolol succinate (TOPROL-XL) 50 mg, Oral, Daily    potassium chloride (KLOR-CON) 10 MEQ TbSR 10 mEq, Oral, 2 times daily    tiotropium bromide (SPIRIVA RESPIMAT) 2.5 mcg/actuation inhaler 2 puffs, Inhalation, Daily, Controller    tiZANidine (ZANAFLEX) 4 mg, Oral, Every 8 hours PRN       Lipid Panel:   Lab Results   Component Value Date    CHOL 137 07/11/2024    HDL 46 07/11/2024    LDLCALC 69.4 07/11/2024    TRIG 108 07/11/2024    CHOLHDL 33.6 07/11/2024         The 10-year ASCVD risk score (Moisés LAUGHLIN, et al., 2019) is: 32.5%    Values used to calculate the score:      Age: 69 years       Sex: Male      Is Non- : No      Diabetic: Yes      Tobacco smoker: No      Systolic Blood Pressure: 136 mmHg      Is BP treated: Yes      HDL Cholesterol: 46 mg/dL      Total Cholesterol: 137 mg/dL    Most Recent EKG Results  Results for orders placed or performed in visit on 08/29/24   IN OFFICE EKG 12-LEAD (to Rhodelia)    Collection Time: 08/29/24 11:20 AM   Result Value Ref Range    QRS Duration 102 ms    [facility] QTC Calculation 512 ms    Narrative    Test Reason : Z95.1,    Vent. Rate : 114 BPM     Atrial Rate : 127 BPM     P-R Int : 000 ms          QRS Dur : 102 ms      QT Int : 372 ms       P-R-T Axes : 000 073 081 degrees     QTc Int : 512 ms    LIkely AT with periods of NSR  Abnormal ECG  When compared with ECG of 13-JUN-2024 14:44,  SVT (likely AT now present). Unclear duration of AT runs.  Nonspecific T wave abnormality no longer evident in Inferior leads  Nonspecific T wave abnormality, improved in Anterior-lateral leads  Confirmed by Pritesh Puckett MD ([id number]) on 8/30/2024 11:46:41 AM    Referred By:             Confirmed By:Pritesh Puckett MD       Most Recent Echocardiogram Results  Results for orders placed during the hospital encounter of 05/20/24    Transesophageal echo (JOHN) with possible cardioversion    Interpretation Summary    Left Ventricle: The left ventricle is normal in size. There is normal systolic function with a visually estimated ejection fraction of 55 - 60%.    Right Ventricle: Normal right ventricular cavity size. Systolic function is normal.    Left Atrium: Left atrium is mildly dilated.      Most Recent Nuclear Stress Test Results  Results for orders placed during the hospital encounter of 12/09/20    Nuclear Stress - Cardiology Interpreted    Interpretation Summary    Normal myocardial perfusion scan. There is no evidence of myocardial ischemia or infarction.    There is a  mild intensity fixed defect in the inferior wall of the left ventricle secondary to diaphragm  attenuation.    Gated perfusion images showed an ejection fraction of 60%    The EKG portion of this study is negative for ischemia.    Arrhythmias during stress: PVCs.    There are no prior studies for comparison.      Most Recent Cardiac PET Stress Test Results  No results found for this or any previous visit.      Most Recent Cardiovascular Angiogram results  Results for orders placed during the hospital encounter of 04/24/24    Cardiac catheterization    Conclusion    Significant two-vessel CAD as described in the text    The left ventricular systolic function was normal.    The left ventricular end diastolic pressure was elevated.    The procedure log was documented by No documenter listed and verified by Jl Neil MD.    Date: 4/24/2024  Time: 12:16 PM      Other Most Recent Cardiology Results  Results for orders placed in visit on 09/30/24    Cardiac monitoring strips        All pertinent data including labs, imaging, EKGs, and studies listed above were reviewed.  All EKG tracing in [facility] were personally interpreted by this provider.    ASSESSMENT:   Bossman Duke is a 69 y.o. male who presents for evaluation of persistent atrial fibrillation and atypical atrial flutter s/p CABG 5/24 with MAZE and JT ligation.  He had atypical flutter several months post CABG, and placed back on amiodarone.    I discussed the pathophysiology and clinical importance of atrial fibrillation and atrial flutter.We discussed the 3 pillars for AF management including stroke risk, optimization, and symptom management (rate vs. rhythm control). I explained the clinical assessment for needing stroke prophylaxis with oral anticoagulation. The patient's [id number] score is 3 which is associated with a 3.2% annual risk of stroke. Indefinite anticoagulation is indicated. I reiterated the need to present to the ED for any head trauma for CT scanning. I also discussed treatment strategies of rate versus rhythm control, and the  "goal to reduce symptomatic arrhythmic episodes by pharmacologic and/or procedural methods. We specifically discussed a trial of anti-arrhythmic drug therapy versus ablation, and the pros/cons of each strategy. We discussed that their is no "cure" for atrial fibrillation, and rhythm control (including PVI) is for symptom management and (if applicable) improvement in heart function. We discussed that AF management is a multifaceted approach, and that management of comorbidities and optimization of lifestyle are paramount to longterm success of rhythm control. I spent about a half hour discussing the nature of RF-PVI including transseptal puncture. We discussed risks and benefits at length. Our discussion included, but was not limited to the risk of bleeding, cardiac perforation, embolism, cardiac tamponade, vascular injury, valvular injury, AE fistula, injury to phrenic nerve, stroke, MI, death, infection, pulmonary vein stenosis and other organic injury including the possibility for need for surgery or pacemaker implantation.      After discussion, he would like to undergo redo PVI and atypical flutter ablation.    1. Persistent atrial fibrillation        2. Atypical atrial flutter        3. Coronary artery disease, unspecified vessel or lesion type, unspecified whether angina present, unspecified whether native or transplanted heart        4. Hx of CABG        5. PVD (peripheral vascular disease)        6. Primary hypertension        7. Chronic diastolic congestive heart failure          PLAN FOR TREATMENT OF ABOVE DIAGNOSES:     Re-do PVI with CTI and posterior wall isolation +/- mitral annular ablation  Stop amiodarone  Start Xarelto today, and hold Plavix at this time  JOHN day of procedure, cancel if NSR  Hold beta blockers 5 days prior to ablation  CBC/BMP 1-2 weeks prior to ablation (if not performed 1 month prior to ablation)  NPO at midnight  Last dose of anticoagulation night prior to procedure  Hold " antihypertensive medications morning of procedure  Weight loss drugs 1 week prior  We discussed applicable risk factor management including heart failure, exercise, HTN, diabetes, tobacco, obesity, EtOH, and sleep  We discussed guideline recommendations of 210 minutes of exercise weekly  Recommend Mediterranean diet diet     F/u in 6-8 weeks post-ablation    Chris Puckett MD  Cardiac Electrophysiology

## 2024-12-12 ENCOUNTER — OFFICE VISIT (OUTPATIENT)
Dept: CARDIOLOGY | Facility: CLINIC | Age: 69
End: 2024-12-12
Payer: MEDICARE

## 2024-12-12 VITALS
HEIGHT: 70 IN | DIASTOLIC BLOOD PRESSURE: 91 MMHG | HEART RATE: 76 BPM | WEIGHT: 302 LBS | BODY MASS INDEX: 43.23 KG/M2 | SYSTOLIC BLOOD PRESSURE: 161 MMHG

## 2024-12-12 DIAGNOSIS — I73.9 PVD (PERIPHERAL VASCULAR DISEASE): ICD-10-CM

## 2024-12-12 DIAGNOSIS — I25.10 CORONARY ARTERY DISEASE, UNSPECIFIED VESSEL OR LESION TYPE, UNSPECIFIED WHETHER ANGINA PRESENT, UNSPECIFIED WHETHER NATIVE OR TRANSPLANTED HEART: ICD-10-CM

## 2024-12-12 DIAGNOSIS — I48.4 ATYPICAL ATRIAL FLUTTER: ICD-10-CM

## 2024-12-12 DIAGNOSIS — Z95.1 HX OF CABG: ICD-10-CM

## 2024-12-12 DIAGNOSIS — I10 PRIMARY HYPERTENSION: ICD-10-CM

## 2024-12-12 DIAGNOSIS — Z98.890 HISTORY OF MAZE PROCEDURE: ICD-10-CM

## 2024-12-12 DIAGNOSIS — Z01.818 PRE-OP EVALUATION: ICD-10-CM

## 2024-12-12 DIAGNOSIS — I50.32 CHRONIC DIASTOLIC CONGESTIVE HEART FAILURE: ICD-10-CM

## 2024-12-12 DIAGNOSIS — I48.19 PERSISTENT ATRIAL FIBRILLATION: ICD-10-CM

## 2024-12-12 PROCEDURE — 99999 PR PBB SHADOW E&M-EST. PATIENT-LVL IV: CPT | Mod: PBBFAC,,, | Performed by: INTERNAL MEDICINE

## 2024-12-12 RX ORDER — POTASSIUM CHLORIDE 750 MG/1
10 TABLET, EXTENDED RELEASE ORAL 2 TIMES DAILY
Qty: 60 TABLET | Refills: 2 | Status: SHIPPED | OUTPATIENT
Start: 2024-12-12

## 2024-12-12 NOTE — PATIENT INSTRUCTIONS
Stop amiodarone today  Stop taking Plavix   Start taking Xaretlo 20 mg at night with dinner  Continue taking aspirin    Afib/Aflutter Ablation    Arrive for your procedure at [facility] on January 7th at 1pm (arrival for 11am)   [address] [place] [address]    You will receive a phone call from [facility] Pre-Op Department with further instructions and exact arrival time prior to your scheduled procedure.    Fasting: You MAY NOT have anything to eat or drink AFTER MIDNIGHT the day of your procedure. If your procedure is scheduled in the afternoon you may have a LIGHT BREAKFAST (plain toast, black coffee or tea).    Medications:    STOP AMIODARONE TODAY 12/12    Please STOP antiarrythmic's 5 days prior to procedure:  Last dose Jan 1st Metoprolol     DO NOT take antihypertensives the morning of the procedure.    Continue blood thinners  (Xarelto) until the morning of the procedure: Last dose Jan 6th     You MAY CONTINUE ASPIRIN, PLAVIX, BRILINTA, and EFFIENT.    Please HOLD GLP1 drugs such as Semiglutide, Ozempic, Wegovy and Monjaro 1 week prior to the procedure.    You will need to arrive approximately 1.5 hours prior to your procedure at the hospital. Please arrange to have transportation home from the hospital. You WILL NOT BE ABLE TO DRIVE YOURSELF HOME.     You can expect to be at the hospital anywhere from 5-8 hours.

## 2025-01-02 ENCOUNTER — HOSPITAL ENCOUNTER (OUTPATIENT)
Dept: CARDIOLOGY | Facility: HOSPITAL | Age: 70
Discharge: HOME | End: 2025-01-02
Attending: INTERNAL MEDICINE
Payer: MEDICARE

## 2025-01-02 VITALS — BODY MASS INDEX: 43.23 KG/M2 | HEIGHT: 70 IN | WEIGHT: 302 LBS | HEART RATE: 78 BPM

## 2025-01-02 DIAGNOSIS — Z95.1 HX OF CABG: ICD-10-CM

## 2025-01-02 DIAGNOSIS — I25.10 CORONARY ARTERY DISEASE, UNSPECIFIED VESSEL OR LESION TYPE, UNSPECIFIED WHETHER ANGINA PRESENT, UNSPECIFIED WHETHER NATIVE OR TRANSPLANTED HEART: ICD-10-CM

## 2025-01-02 DIAGNOSIS — I48.19 PERSISTENT ATRIAL FIBRILLATION: ICD-10-CM

## 2025-01-02 PROCEDURE — 93306 TTE W/DOPPLER COMPLETE: CPT | Mod: 26,,, | Performed by: INTERNAL MEDICINE

## 2025-01-02 PROCEDURE — 93306 TTE W/DOPPLER COMPLETE: CPT | Mod: PO

## 2025-01-03 ENCOUNTER — TELEPHONE (OUTPATIENT)
Dept: CARDIOLOGY | Facility: CLINIC | Age: 70
End: 2025-01-03
Payer: MEDICARE

## 2025-01-03 LAB
ASCENDING AORTA: 3.02 CM
AV INDEX (PROSTH): 0.75
AV MEAN GRADIENT: 2.9 MMHG
AV PEAK GRADIENT: 4.8 MMHG
AV VALVE AREA BY VELOCITY RATIO: 2.3 CM²
AV VALVE AREA: 2.1 CM²
AV VELOCITY RATIO: 0.82
BSA FOR ECHO PROCEDURE: 2.6 M2
CV ECHO LV RWT: 0.64 CM
DOP CALC AO PEAK VEL: 1.1 M/S
DOP CALC AO VTI: 20.8 CM
DOP CALC LVOT AREA: 2.8 CM2
DOP CALC LVOT DIAMETER: 1.9 CM
DOP CALC LVOT PEAK VEL: 0.9 M/S
DOP CALC LVOT STROKE VOLUME: 43.9 CM3
DOP CALCLVOT PEAK VEL VTI: 15.5 CM
E WAVE DECELERATION TIME: 198.38 MSEC
E/A RATIO: 1.44
E/E' RATIO: 7.81 M/S
ECHO LV POSTERIOR WALL: 1.4 CM (ref 0.6–1.1)
FRACTIONAL SHORTENING: 27.3 % (ref 28–44)
INTERVENTRICULAR SEPTUM: 1 CM (ref 0.6–1.1)
IVRT: 131.3 MSEC
LEFT ATRIUM AREA SYSTOLIC (APICAL 2 CHAMBER): 20.19 CM2
LEFT ATRIUM SIZE: 3.68 CM
LEFT INTERNAL DIMENSION IN SYSTOLE: 3.2 CM (ref 2.1–4)
LEFT VENTRICLE DIASTOLIC VOLUME INDEX: 36.02 ML/M2
LEFT VENTRICLE DIASTOLIC VOLUME: 89.7 ML
LEFT VENTRICLE END SYSTOLIC VOLUME APICAL 2 CHAMBER: 52.21 ML
LEFT VENTRICLE MASS INDEX: 76.8 G/M2
LEFT VENTRICLE SYSTOLIC VOLUME INDEX: 15.9 ML/M2
LEFT VENTRICLE SYSTOLIC VOLUME: 39.68 ML
LEFT VENTRICULAR INTERNAL DIMENSION IN DIASTOLE: 4.4 CM (ref 3.5–6)
LEFT VENTRICULAR MASS: 191.3 G
LV LATERAL E/E' RATIO: 7.45 M/S
LV SEPTAL E/E' RATIO: 8.2 M/S
LVED V (TEICH): 89.7 ML
LVES V (TEICH): 39.68 ML
LVOT MG: 1.43 MMHG
LVOT MV: 0.55 CM/S
MV PEAK A VEL: 0.57 M/S
MV PEAK E VEL: 0.82 M/S
MV STENOSIS PRESSURE HALF TIME: 57.53 MS
MV VALVE AREA P 1/2 METHOD: 3.82 CM2
OHS CV RV/LV RATIO: 0.8 CM
PISA MRMAX VEL: 2.49 M/S
PISA TR MAX VEL: 1.98 M/S
PULM VEIN S/D RATIO: 1.19
PV PEAK D VEL: 0.36 M/S
PV PEAK S VEL: 0.43 M/S
RA PRESSURE ESTIMATED: 3 MMHG
RA VOL SYS: 56.27 ML
RIGHT ATRIAL AREA: 20.4 CM2
RIGHT ATRIUM VOLUME AREA LENGTH APICAL 4 CHAMBER: 55.08 ML
RIGHT VENTRICLE DIASTOLIC BASEL DIMENSION: 3.5 CM
RIGHT VENTRICLE DIASTOLIC LENGTH: 4.6 CM
RIGHT VENTRICLE DIASTOLIC MID DIMENSION: 3.6 CM
RIGHT VENTRICULAR END-DIASTOLIC DIMENSION: 3.45 CM
RIGHT VENTRICULAR LENGTH IN DIASTOLE (APICAL 4-CHAMBER VIEW): 4.57 CM
RV MID DIAMA: 3.55 CM
RV TB RVSP: 5 MMHG
RV TISSUE DOPPLER FREE WALL SYSTOLIC VELOCITY 1 (APICAL 4 CHAMBER VIEW): 8.19 CM/S
SINUS: 3.12 CM
STJ: 3.11 CM
TDI LATERAL: 0.11 M/S
TDI SEPTAL: 0.1 M/S
TDI: 0.11 M/S
TR MAX PG: 16 MMHG
TRICUSPID ANNULAR PLANE SYSTOLIC EXCURSION: 1.77 CM
TV REST PULMONARY ARTERY PRESSURE: 19 MMHG
Z-SCORE OF LEFT VENTRICULAR DIMENSION IN END DIASTOLE: -11.26
Z-SCORE OF LEFT VENTRICULAR DIMENSION IN END SYSTOLE: -7.3

## 2025-01-03 NOTE — TELEPHONE ENCOUNTER
----- Message from Mana sent at 1/3/2025  9:19 AM CST -----  Contact: pt  Type:  Needs Medical Advice    Who Called: pt    Would the patient rather a call back or a response via [facility]?  Call back    Best Call Back Number: [phone]    Additional Information: is having procedure on 01/07 and needs to know when to stop his blood thinners    Please call to advise    Thanks   Addended by: LUIZ CALDERA on: 10/2/2024 04:25 PM     Modules accepted: Orders

## 2025-01-28 ENCOUNTER — OFFICE VISIT (OUTPATIENT)
Dept: OPTOMETRY | Facility: CLINIC | Age: 70
End: 2025-01-28
Payer: MEDICARE

## 2025-01-28 DIAGNOSIS — H25.11 NUCLEAR SCLEROSIS OF RIGHT EYE: Primary | ICD-10-CM

## 2025-01-28 DIAGNOSIS — H50.112 EXOTROPIA OF LEFT EYE: ICD-10-CM

## 2025-01-28 DIAGNOSIS — H52.01 HYPEROPIA WITH ASTIGMATISM AND PRESBYOPIA, RIGHT: ICD-10-CM

## 2025-01-28 DIAGNOSIS — H43.811 POSTERIOR VITREOUS DETACHMENT, RIGHT: ICD-10-CM

## 2025-01-28 DIAGNOSIS — H52.201 HYPEROPIA WITH ASTIGMATISM AND PRESBYOPIA, RIGHT: ICD-10-CM

## 2025-01-28 DIAGNOSIS — H54.40 BLINDNESS OF LEFT EYE WITH NORMAL VISION IN CONTRALATERAL EYE: ICD-10-CM

## 2025-01-28 DIAGNOSIS — H52.4 HYPEROPIA WITH ASTIGMATISM AND PRESBYOPIA, RIGHT: ICD-10-CM

## 2025-01-28 DIAGNOSIS — Z13.5 GLAUCOMA SCREENING: ICD-10-CM

## 2025-01-28 PROCEDURE — 1126F AMNT PAIN NOTED NONE PRSNT: CPT | Mod: CPTII,S$GLB,, | Performed by: OPTOMETRIST

## 2025-01-28 PROCEDURE — 92015 DETERMINE REFRACTIVE STATE: CPT | Mod: S$GLB,,, | Performed by: OPTOMETRIST

## 2025-01-28 PROCEDURE — 1101F PT FALLS ASSESS-DOCD LE1/YR: CPT | Mod: CPTII,S$GLB,, | Performed by: OPTOMETRIST

## 2025-01-28 PROCEDURE — 92250 FUNDUS PHOTOGRAPHY W/I&R: CPT | Mod: S$GLB,,, | Performed by: OPTOMETRIST

## 2025-01-28 PROCEDURE — 99999 PR PBB SHADOW E&M-EST. PATIENT-LVL II: CPT | Mod: PBBFAC,,, | Performed by: OPTOMETRIST

## 2025-01-28 PROCEDURE — 3288F FALL RISK ASSESSMENT DOCD: CPT | Mod: CPTII,S$GLB,, | Performed by: OPTOMETRIST

## 2025-01-28 PROCEDURE — 92014 COMPRE OPH EXAM EST PT 1/>: CPT | Mod: S$GLB,,, | Performed by: OPTOMETRIST

## 2025-01-28 NOTE — PATIENT INSTRUCTIONS
"DRY EYES -- BURNING OR SLADE SYMPTOMS:  Use Over The Counter artificial tears as needed for dry eye symptoms.   Some common brands include:  Systane, Optive, Refresh, and Thera-Tears.  These drops can be used as frequently as desired, but may be most helpful use during long periods of concentrated work.  For example, reading / working at the computer. Start with 3-4x per day.     Nighttime Ophthalmic gel or ointments are available: Refresh PM, Genteal, and Lacrilube.    Avoid drops that "get redness out" (Visine, Murine, Clear Eyes), as these may contain medication that could further irritate the eyes, especially with chronic use.    ALLERGY EYES -- ITCHING SYMPTOMS:  Over the counter medications include--Pataday, Zaditor, and Alaway.  Use as directed 1-2 drops daily for symptoms of itching / watering eyes.  These drops will not help for dry eye or exposure symptoms.    REDNESS RELIEF:  Lumify---is a good redness reliever that will not cause irritation if used chronically.        FLASHES / FLOATERS / POSTERIOR VITREOUS DETACHMENT    Call the clinic if you have any further changes in symptoms.  Including:  Increased numbers of floaters or flashing lights, dimness or darkness that moves through or stays constant in your vision, or any pain in the eye (s).    You may sometimes see small specks or clouds moving in your field of vision.  They are called FLOATERS.  You can often see them when looking at a plain background, like a blank wall or blue jonn.  Floaters are actually tiny clumps of gel or cells inside the VITREOUS, the clear jelly-like fluid that fills the inside of your eye.    While these objects look like they are in front of your eye, they are actually floating inside.  What you see are the shadows they cast on the RETINA, the nerve layer at the back of the eye that senses light and allows you to see.      POSTERIOR VITREOUS DETACHMENT    The appearance of new floaters may be alarming.  If you suddenly " develop new floaters, you should contact your eye care professional  right away.    The retina can tear if the shrinking vitreous pulls away from the wall of the eye.  This sometimes causes a small amount of bleeding in the eye that may appear as new floaters.    A torn retina is always a serious problem, since it can lead to a retinal detachment.  You should see your eye care professional as soon as possible if:    even one new floater appears suddenly;  you see sudden flashes of light;  you notice other symptoms, like the loss of side vision, or a curtain closes down in your vision        POSTERIOR VITREOUS DETACHMENT is more common for people who:    are nearsighted;  have had cataract surgery;  have had YAG laser surgery of the eye;  have had inflammation inside the eye;  are over age 60.      While some floaters may remain visible, many of them will fade over time and become less noticeable.  Even if you've had some floaters for years, you should have your eyes checked as soon as possible if you notice new ones.    FLASHING LIGHTS    When the vitreous gel rubs or pulls on the retina, you may see what look like flashing lights or lightning streaks.  These flashes can appear off and on for several weeks or months.      Some people experience flashes of light that appear as jagged lines or heat waves in both eyes, lasting 10-20 minutes.  These flashes are caused by a spasm of blood vessels in the brain, which is called a migraine.    If a headache follows these flashes, it's called a migraine headache.  If   no headache occurs, these flashes are called Ophthalmic or Ocular Migraine.
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
## 2025-02-27 ENCOUNTER — TELEPHONE (OUTPATIENT)
Dept: CARDIOLOGY | Facility: CLINIC | Age: 70
End: 2025-02-27
Payer: MEDICARE

## 2025-02-27 ENCOUNTER — OFFICE VISIT (OUTPATIENT)
Dept: CARDIOLOGY | Facility: CLINIC | Age: 70
End: 2025-02-27
Payer: MEDICARE

## 2025-02-27 VITALS
BODY MASS INDEX: 43.02 KG/M2 | DIASTOLIC BLOOD PRESSURE: 84 MMHG | SYSTOLIC BLOOD PRESSURE: 148 MMHG | HEIGHT: 70 IN | HEART RATE: 82 BPM | WEIGHT: 300.5 LBS

## 2025-02-27 DIAGNOSIS — E66.01 MORBID (SEVERE) OBESITY DUE TO EXCESS CALORIES: ICD-10-CM

## 2025-02-27 DIAGNOSIS — I48.4 ATYPICAL ATRIAL FLUTTER: Primary | ICD-10-CM

## 2025-02-27 DIAGNOSIS — I73.9 PVD (PERIPHERAL VASCULAR DISEASE): ICD-10-CM

## 2025-02-27 DIAGNOSIS — G47.33 OSA (OBSTRUCTIVE SLEEP APNEA): ICD-10-CM

## 2025-02-27 DIAGNOSIS — I25.10 CORONARY ARTERY DISEASE, UNSPECIFIED VESSEL OR LESION TYPE, UNSPECIFIED WHETHER ANGINA PRESENT, UNSPECIFIED WHETHER NATIVE OR TRANSPLANTED HEART: ICD-10-CM

## 2025-02-27 DIAGNOSIS — Z95.1 HX OF CABG: ICD-10-CM

## 2025-02-27 DIAGNOSIS — I48.19 PERSISTENT ATRIAL FIBRILLATION: ICD-10-CM

## 2025-02-27 DIAGNOSIS — I10 PRIMARY HYPERTENSION: ICD-10-CM

## 2025-02-27 DIAGNOSIS — I50.32 CHRONIC DIASTOLIC CONGESTIVE HEART FAILURE: ICD-10-CM

## 2025-02-27 PROCEDURE — 99999 PR PBB SHADOW E&M-EST. PATIENT-LVL IV: CPT | Mod: PBBFAC,,, | Performed by: INTERNAL MEDICINE

## 2025-02-27 RX ORDER — MUPIROCIN 20 MG/G
OINTMENT TOPICAL
OUTPATIENT
Start: 2025-02-27

## 2025-02-27 RX ORDER — SODIUM CHLORIDE 9 MG/ML
INJECTION, SOLUTION INTRAVENOUS CONTINUOUS
OUTPATIENT
Start: 2025-02-27

## 2025-02-27 NOTE — PROGRESS NOTES
"    SUBJECTIVE:    Patient ID:  Bossman Duke is a 69 y.o. male who presents for follow of AF    Medical history:  Persistent atrial fibrillation  Atypical AFL  CAD s/p CABG with MAZE + JT ligation (5/20/24)  PAD s/p PTA mid SFA 2021  HTN   COPD  Morbid obesity  Tobacco use     Cardiologist: Dr. Jon Neil     Patient was referred by Dr. Migdalia Roberts for AF management.  Patient was initially diagnosed with AF around 3/22 after being hospitalized with a pneumonia.  He was started on Xarelto and Cardizem at a time.  He had issues with bleeding from his gums, and oral anticoagulation was discontinued.     He had recurrent AF with RVR 9/23.  He was discharged in rate controlled AF on Eliquis.  Eventually, he D/C Eliquis due to GI and gum bleeding.     He was having LAWSON prompting LHC and eventual CABG x3.  Additionally, he underwent epicardial MAZE (isolation of all 4 pulmonary veins) procedure and left atrial appendage ligation (AtriClip).  Of note, the op report states that his PVs were "enormous".     Postoperatively, he had transient AF and started on amiodarone.  He was seen in clinic 08/29/2024 with recurrent AF with RVR > amio resumed. Anticoagulation was discussed but he preferred dual antiplatelet therapy     All ECGs in EMR reviewed.  With 1st ECG 9/23 showing atrial fibrillation with RVR.  Most recent ECG shows likely atypical flutter versus AT.     TTE 5/24:  Normal biventricular function.     12/10/2024  Reports some dyspnea on exertion.  Reports significant symptoms of shortness of breath and palpitations with his atrial arrhythmias.  Taking amiodarone twice a day  Reports compliance with CPAP    02/26/2025  S/p redo PVI (re-isolation of LSPV), posterior wall isolation, and CTI ablation 1/7/25  Denies any palpitations or recurrent arrhythmias to his knowledge   Tolerating Xarelto    I personally reviewed the ECG/telemetry strip today. My interpretation is sinus rhythm with occasional PACs.  Heart rate 84 " beats per minute.   milliseconds.    Past Medical History:   Diagnosis Date    Anticoagulant long-term use     Arthritis     COPD (chronic obstructive pulmonary disease)     Coronary artery disease     Diabetes mellitus     Hypertension     PAD (peripheral artery disease)     PUD (peptic ulcer disease)     Sleep apnea     CPAP    Vertigo        Past Surgical History:   Procedure Laterality Date    ABLATION AND RECONSTRUCTION, CARDIAC ATRIUM, WITH LIMITED TISSUE ABLATION N/A 5/20/2024    Procedure: ABLATION AND RECONSTRUCTION, CARDIAC ATRIUM, WITH LIMITED TISSUE ABLATION;  Surgeon: Migdalia Roberts MD;  Location: [facility] OR;  Service: Cardiothoracic;  Laterality: N/A;    ABLATION, ATRIAL FIBRILLATION, CRYO  1/7/2025    Procedure: Ablation A-Fib Re-Do RFA;  Surgeon: Pritesh Puckett MD;  Location: [facility] CATH;  Service: Cardiology;;    ANGIOGRAM, CORONARY, WITH LEFT HEART CATHETERIZATION  4/24/2024    Procedure: Left Heart Cath;  Surgeon: Jl Neil MD;  Location: [facility] CATH;  Service: Cardiology;;    APPENDECTOMY      CERVICAL SPINE SURGERY      CORONARY ARTERY BYPASS GRAFT (CABG) N/A 5/20/2024    Procedure: CORONARY ARTERY BYPASS GRAFT (CABG) x3;  Surgeon: Migdalia Roberts MD;  Location: [facility] OR;  Service: Cardiothoracic;  Laterality: N/A;    ECHOCARDIOGRAM,TRANSESOPHAGEAL N/A 5/20/2024    Procedure: Transesophageal echo (JOHN) intra-procedure log documentation;  Surgeon: Migdalia Roberts MD;  Location: [facility] OR;  Service: Cardiothoracic;  Laterality: N/A;    ECHOCARDIOGRAM,TRANSESOPHAGEAL  1/7/2025    Procedure: (JOHN) intra-procedure;  Surgeon: Pritesh Puckett MD;  Location: [facility] CATH;  Service: Cardiology;;    ENDOSCOPIC HARVEST OF VEIN Right 5/20/2024    Procedure: HARVEST-VEIN-ENDOVASCULAR;  Surgeon: Migdalia Roberts MD;  Location: [facility] OR;  Service: Cardiothoracic;  Laterality: Right;    EPIDURAL STEROID INJECTION INTO LUMBAR SPINE N/A 10/5/2022    Procedure: Injection-steroid-epidural-lumbar L3/4;   "Surgeon: Linwood Aponte MD;  Location: [facility] OR;  Service: Pain Management;  Laterality: N/A;    EXCLUSION, LEFT ATRIAL APPENDAGE, OPEN, AS PART OF OPEN CHEST SURGERY Left 5/20/2024    Procedure: EXCLUSION, LEFT ATRIAL APPENDAGE, OPEN, AS PART OF OPEN CHEST SURGERY;  Surgeon: Migdalia Roberts MD;  Location: [facility] OR;  Service: Cardiothoracic;  Laterality: Left;    EYE SURGERY Left     "as  a child"    INJECTION OF ANESTHETIC AGENT AROUND MEDIAL BRANCH NERVES INNERVATING LUMBAR FACET JOINT Bilateral 2/9/2021    Procedure: Block-nerve-medial branch-lumbar L3/4, L4/5, L5/S1;  Surgeon: Linwood Aponte MD;  Location: [facility] OR;  Service: Pain Management;  Laterality: Bilateral;    LUMBAR DISC SURGERY      PERCUTANEOUS TRANSLUMINAL ANGIOPLASTY N/A 2/15/2021    Procedure: Pta (Angioplasty, Percutaneous, Transluminal);  Surgeon: Jl Neil MD;  Location: [facility] CATH;  Service: Cardiology;  Laterality: N/A;    RADIOFREQUENCY ABLATION Right 3/1/2021    Procedure: Radiofrequency Ablation L3/4, L4/5;  Surgeon: Linwood Aponte MD;  Location: [facility] OR;  Service: Pain Management;  Laterality: Right;    RADIOFREQUENCY ABLATION Left 3/10/2021    Procedure: Radiofrequency Ablation L3/4, L4/5;  Surgeon: Linwood Aponte MD;  Location: [facility] OR;  Service: Pain Management;  Laterality: Left;    RADIOFREQUENCY ABLATION OF LUMBAR MEDIAL BRANCH NERVE AT SINGLE LEVEL Bilateral 8/23/2022    Procedure: Radiofrequency Ablation, Nerve, Spinal, Lumbar, Medial Branch, L3/4, L4/5;  Surgeon: Linwood Aponte MD;  Location: [facility] OR;  Service: Pain Management;  Laterality: Bilateral;       Family History   Problem Relation Name Age of Onset    Diabetes Mother      Heart disease Mother      Heart disease Father      Glaucoma Neg Hx      Macular degeneration Neg Hx      Retinal detachment Neg Hx         Social History     Socioeconomic History    Marital status:    Tobacco Use    Smoking status: Former     Current packs/day: 0.00     Average " packs/day: 2.0 packs/day for 56.0 years (112.0 ttl pk-yrs)     Types: Cigarettes     Start date:      Quit date:      Years since quittin.1    Smokeless tobacco: Never   Substance and Sexual Activity    Alcohol use: Yes     Alcohol/week: 2.0 standard drinks of alcohol     Types: 2 Cans of beer per week     Comment: every other week    Drug use: Never     Social Drivers of Health     Food Insecurity: No Food Insecurity (2024)    Hunger Vital Sign     Worried About Running Out of Food in the Last Year: Never true     Ran Out of Food in the Last Year: Never true   Transportation Needs: No Transportation Needs (2024)    TRANSPORTATION NEEDS     Transportation : No   Housing Stability: Unknown (2024)    Housing Stability Vital Sign     Unable to Pay for Housing in the Last Year: No     Homeless in the Last Year: No       Review of patient's allergies indicates:  No Known Allergies    Review of Systems   All other systems reviewed and are negative.         OBJECTIVE:     Vitals:    25 0855   BP: (!) 148/84   Pulse: 82         BP Readings from Last 5 Encounters:   25 134/83   24 (!) 161/91   10/11/24 136/78   24 112/64   24 112/76        Physical Exam  Vitals and nursing note reviewed.   Constitutional:       General: He is not in acute distress.     Appearance: He is well-developed. He is obese. He is not diaphoretic.   HENT:      Head: Normocephalic and atraumatic.   Eyes:      General: No scleral icterus.        Right eye: No discharge.         Left eye: No discharge.   Cardiovascular:      Rate and Rhythm: Normal rate and regular rhythm. No extrasystoles are present.     Pulses: Normal pulses and intact distal pulses.           Radial pulses are 2+ on the right side and 2+ on the left side.      Heart sounds: Normal heart sounds, S1 normal and S2 normal. Heart sounds not distant. No opening snap. No murmur heard.     No friction rub. No gallop. No S3 or S4  sounds.   Pulmonary:      Effort: Pulmonary effort is normal. No respiratory distress.      Breath sounds: No wheezing or rales.   Abdominal:      General: Bowel sounds are normal. There is no distension.      Palpations: Abdomen is soft.      Tenderness: There is no abdominal tenderness.   Musculoskeletal:         General: No deformity. Normal range of motion.      Cervical back: Normal range of motion and neck supple.   Skin:     General: Skin is warm and dry.      Findings: No erythema or rash.   Neurological:      Mental Status: He is alert.             Current Outpatient Medications   Medication Instructions    aspirin (ECOTRIN) 81 mg, Daily    atorvastatin (LIPITOR) 20 mg, Oral, Daily    budesonide (PULMICORT) 0.5 mg, Nebulization, 2 times daily, Controller    diclofenac sodium (VOLTAREN) 2 g, Topical (Top), 4 times daily    furosemide (LASIX) 40 mg, Oral, 2 times daily    levalbuterol (XOPENEX) 1.25 mg, Nebulization, Every 6 hours PRN, Rescue    metFORMIN (GLUCOPHAGE-XR) 500 mg, Oral, With breakfast    metoprolol succinate (TOPROL-XL) 50 mg, Oral, Daily    potassium chloride (KLOR-CON) 10 MEQ TbSR 10 mEq, Oral, 2 times daily    tiotropium bromide (SPIRIVA RESPIMAT) 2.5 mcg/actuation inhaler 2 puffs, Inhalation, Daily, Controller    tiZANidine (ZANAFLEX) 4 mg, Oral, Every 8 hours PRN    XARELTO 20 mg, Oral, With dinner       Lipid Panel:   Lab Results   Component Value Date    CHOL 137 07/11/2024    HDL 46 07/11/2024    LDLCALC 69.4 07/11/2024    TRIG 108 07/11/2024    CHOLHDL 33.6 07/11/2024       The 10-year ASCVD risk score (Moisés LAUGHLIN, et al., 2019) is: 31.8%    Values used to calculate the score:      Age: 69 years      Sex: Male      Is Non- : No      Diabetic: Yes      Tobacco smoker: No      Systolic Blood Pressure: 134 mmHg      Is BP treated: Yes      HDL Cholesterol: 46 mg/dL      Total Cholesterol: 137 mg/dL    Most Recent EKG Results  Results for orders placed or performed  during the hospital encounter of 01/07/25   EKG 12-lead    Collection Time: 01/07/25  3:40 PM   Result Value Ref Range    QRS Duration 96 ms    [facility] QTC Calculation 488 ms    Narrative    Test Reason : Z98.890,Z86.79,    Vent. Rate :  72 BPM     Atrial Rate :  72 BPM     P-R Int : 206 ms          QRS Dur :  96 ms      QT Int : 446 ms       P-R-T Axes :  93  71  81 degrees    QTcB Int : 488 ms    Normal sinus rhythm  Nonspecific ST abnormality  Prolonged QT  Abnormal ECG  When compared with ECG of 07-Jan-2025 10:58,  No significant change was found  Confirmed by Galdino Das ([id number]) on 1/8/2025 2:34:37 PM    Referred By: Pritesh Puckett           Confirmed By: Galdino Dawkins       Most Recent Echocardiogram Results  Results for orders placed during the hospital encounter of 01/02/25    Echo    Interpretation Summary    Very limited study given poor acoustic windows    LV and RV funciton appear groslly normal    IVC/SVC: Normal venous pressure at 3 mmHg.    No significant valular disease but limited sensitivity    No obvisous effusion      Most Recent Nuclear Stress Test Results  Results for orders placed during the hospital encounter of 12/09/20    Nuclear Stress - Cardiology Interpreted    Interpretation Summary    Normal myocardial perfusion scan. There is no evidence of myocardial ischemia or infarction.    There is a  mild intensity fixed defect in the inferior wall of the left ventricle secondary to diaphragm attenuation.    Gated perfusion images showed an ejection fraction of 60%    The EKG portion of this study is negative for ischemia.    Arrhythmias during stress: PVCs.    There are no prior studies for comparison.      Most Recent Cardiac PET Stress Test Results  No results found for this or any previous visit.      Most Recent Cardiovascular Angiogram results  Results for orders placed during the hospital encounter of 01/07/25    Intra-Procedure Documentation    Narrative  JOHN performed  in the Invasive Lab  - See Procedure Log link below for nursing documentation  - See JOHN order on Card Proc Tab for physician findings      Other Most Recent Cardiology Results  Results for orders placed during the hospital encounter of 01/07/25    Cardiac monitoring strips        All pertinent data including labs, imaging, EKGs, and studies listed above were reviewed.  All of the patients ECG tracings since most recent visit were personally interpreted by this provider    ASSESSMENT:   Bossman Duke is a 69 y.o. male who presents for follow of AF s/p re-do PVI 1/7/25.  Doing well.  He was previously on dual antiplatelet therapy secondary to surgically ligated left atrial appendage.  I discussed the importance of confirming complete ligation prior to long-term discontinuation of oral anticoagulation.    The risks, benefits and alternatives of transesophageal echocardiogram (JOHN) were explained to the patient, patient's family and/or surrogate decision maker.   No absolute contraindications of esophageal stricture, tumor, perforation, laceration,or diverticulum and/or active GI bleed.  The risks include (but are not limited to) trauma to oral structures, esophageal trauma/perforation, bleeding, laryngospasm/brochospasm, infection, aspiration, sore throat/hoarseness, CVA, MI, and death. The patient wishes to proceed. All questions and concerns addressed.      1. Atypical atrial flutter        2. Persistent atrial fibrillation        3. Morbid (severe) obesity due to excess calories        4. Chronic diastolic congestive heart failure        5. PVD (peripheral vascular disease)        6. Primary hypertension        7. Coronary artery disease, unspecified vessel or lesion type, unspecified whether angina present, unspecified whether native or transplanted heart        8. Hx of CABG        9. BIB (obstructive sleep apnea)          PLAN FOR TREATMENT OF ABOVE DIAGNOSES:     Persistent atrial fibrillation and atypical AFL:  currently in sinus  Continue Toprol 50 mg q.d.  Patient needs a JOHN to confirm fully ligated left atrial appendage prior to discontinuation of oral anticoagulation long-term.  Continue anticoagulation x 3 months post-ablation then return to Plavix 75 mg QD.  End date 04/07/2025.  At that time we will restart Plavix 75 mg q.d. with aspirin.  We discussed applicable risk factor management including heart failure, BIB, blood pressure, diabetes, tobacco use, maintaining a healthy BMI, and limiting EtOH  We discussed AF guideline recommendations of 210 minutes of exercise weekly    Coronary artery disease s/p CABG: stable. No angina.  Continue aspirin  Restart Plavix after Xarelto x 3 months post-ablation  Continue statin  BP control, exercise, healthy BMI, no tobacco use    Hypertension: above goal  Continue Toprol 50 mg q.d.  Continue Lasix 40 mg b.i.d.  Avoid NSAID use  Low-sodium diet less than 2000 mg a day and exercise    Obesity:  Mediterranean diet  Moderate intensity exercise for 150 minutes per week, or vigorous exercise 75 min per week    BIB:   Continue CPAP        F/u 4 months      Chris Puckett MD  Cardiac Electrophysiology    This note was partially generated using voice recognition and generative artificial intelligence software. While every effort has been made to ensure its accuracy, transcription errors may exist. Please reach out to me with any questions or if clarification is needed.

## 2025-02-27 NOTE — PATIENT INSTRUCTIONS
JOHN    Arrive for your procedure at:  [facility], check in at building number 2, 1st floor       FASTING:  You MAY NOT have anything to eat AFTER MIDNIGHT. YOU MAY continue with CLEAR LIQUIDS ONLY (water, clear juice, sprite/7up, Gatorade)  up to 2 hours before your procedure.  If your procedure is scheduled in the afternoon, you may have a LIGHT BREAKFAST BEFORE 6:00 A.M.  For example: Two slices of toast; black coffee or black tea.    MEDICATIONS:  You may take your regular morning medications with a small sip of water.      Hold or adjust the following:                -Fluid pills. Hold morning of procedure    -Diabetes medications - Hold morning of procedure.       Continue: Eliquis, Xarelto, Coumadin, Plavix, Effient, Aspirin and other anti-coagulants/blood thinners PLEASE TAKE MORNING OF THE PROCEDURE    Please refer to pre-op instructions received from the surgery center. YOU WILL NEED SOMEONE TO DRIVE YOU HOME.

## 2025-03-05 ENCOUNTER — TELEPHONE (OUTPATIENT)
Facility: CLINIC | Age: 70
End: 2025-03-05
Payer: MEDICARE

## 2025-03-05 NOTE — NURSING
Received a pulmonary referral from [facility].  Attempted to reach patient and schedule a visit in our lung clinic. TERESITA.

## 2025-03-06 ENCOUNTER — TELEPHONE (OUTPATIENT)
Facility: CLINIC | Age: 70
End: 2025-03-06
Payer: MEDICARE

## 2025-03-06 NOTE — NURSING
Reached out to pt to schedule in lung clinic. Scheduled appt for 3/11 with Dr Nam. Pt accepted appt and VU of date/time/location.     Oncology Navigation   Intake  Cancer Type: LDCT/Incidental Lung Finding  Type of Referral: Internal  Date of Referral: 02/28/25  Initial Nurse Navigator Contact: 02/28/25  Referral to Initial Contact Timeline (days): 0  First Appointment Available: 03/11/25  Appointment Date: 03/11/25  First Available Date vs. Scheduled Date (days): 0     Treatment  Current Status: Staging work-up             Procedures: CT  CT Schedule Date: 02/27/25                 Acuity      Follow Up  No follow-ups on file.

## 2025-03-10 NOTE — PROGRESS NOTES
[facility]   Initial Office Visit  No chief complaint on file.      SUBJECTIVE:     History of Present Illness:  Patient is a 69 y.o. male presents for evaluation. Pulmonary evaluation for pulmonary nodules referred by Dr. Macias.    Mr. Duke is a 68 yo male ex-smoker having 2 ppd for 34 years - quit 2 years ago - with past medical history HTN, DM2 not on insulin, left eye injury, appendectomy, cervical surgery 2016, CAD with ACB X 3 03/2024 by Dr. Roberts - jermaine Webb, afib s/p ablation 01/07/2025 Dr. Puckett, BIB on CPAP, COPD (spiriva  and budesonide as maintenance and levalbuterol prn), PAD with stent in left leg - on Xarelto referred for new RUL collapse.    Symptoms include LAWSON when walking from parking lot to office.  No hemoptysis.  No weight loss.  No history of cancer  Some wheezing, no chest tightness.  No productive cough - just has sinus drainage.  No heartburn.  No dysphagia.    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  Lives with his brother.  No history of asbestos exposure.    CT of the chest from August 2024 compared to CT of the chest from February 27, 2025 revealing new right upper lobe collapse and loss of right upper lobe airway.      Patient is adamant that he is not interested in chemotherapy, immunotherapy, or radiation.    No family history of cancer just heart disease.      Past Medical History:   Diagnosis Date    Anticoagulant long-term use     Arthritis     COPD (chronic obstructive pulmonary disease)     Coronary artery disease     Diabetes mellitus     Hypertension     PAD (peripheral artery disease)     PUD (peptic ulcer disease)     Sleep apnea     CPAP    Vertigo      Past Surgical History:   Procedure Laterality Date    ABLATION AND RECONSTRUCTION, CARDIAC ATRIUM, WITH LIMITED TISSUE ABLATION N/A 5/20/2024    Procedure: ABLATION AND RECONSTRUCTION, CARDIAC ATRIUM, WITH LIMITED TISSUE ABLATION;   "Surgeon: Migdalia Roberts MD;  Location: [facility] OR;  Service: Cardiothoracic;  Laterality: N/A;    ABLATION, ATRIAL FIBRILLATION, CRYO  1/7/2025    Procedure: Ablation A-Fib Re-Do RFA;  Surgeon: Pritesh Puckett MD;  Location: [facility] CATH;  Service: Cardiology;;    ANGIOGRAM, CORONARY, WITH LEFT HEART CATHETERIZATION  4/24/2024    Procedure: Left Heart Cath;  Surgeon: Jl Neil MD;  Location: [facility] CATH;  Service: Cardiology;;    APPENDECTOMY      CERVICAL SPINE SURGERY      CORONARY ARTERY BYPASS GRAFT (CABG) N/A 5/20/2024    Procedure: CORONARY ARTERY BYPASS GRAFT (CABG) x3;  Surgeon: Migdalia Roberts MD;  Location: [facility] OR;  Service: Cardiothoracic;  Laterality: N/A;    ECHOCARDIOGRAM,TRANSESOPHAGEAL N/A 5/20/2024    Procedure: Transesophageal echo (JOHN) intra-procedure log documentation;  Surgeon: Migdalia Roberts MD;  Location: [facility] OR;  Service: Cardiothoracic;  Laterality: N/A;    ECHOCARDIOGRAM,TRANSESOPHAGEAL  1/7/2025    Procedure: (JOHN) intra-procedure;  Surgeon: Pritesh Puckett MD;  [facility];  Service: Cardiology;;    ENDOSCOPIC HARVEST OF VEIN Right 5/20/2024    Procedure: HARVEST-VEIN-ENDOVASCULAR;  Surgeon: Migdalia Roberts MD;  Location: [facility] OR;  Service: Cardiothoracic;  Laterality: Right;    EPIDURAL STEROID INJECTION INTO LUMBAR SPINE N/A 10/5/2022    Procedure: Injection-steroid-epidural-lumbar L3/4;  Surgeon: Linwood Aponte MD;  Location: [facility] OR;  Service: Pain Management;  Laterality: N/A;    EXCLUSION, LEFT ATRIAL APPENDAGE, OPEN, AS PART OF OPEN CHEST SURGERY Left 5/20/2024    Procedure: EXCLUSION, LEFT ATRIAL APPENDAGE, OPEN, AS PART OF OPEN CHEST SURGERY;  Surgeon: Migdalia Roberts MD;  Location: [facility] OR;  Service: Cardiothoracic;  Laterality: Left;    EYE SURGERY Left     "as  a child"    INJECTION OF ANESTHETIC AGENT AROUND MEDIAL BRANCH NERVES INNERVATING LUMBAR FACET JOINT Bilateral 2/9/2021    Procedure: Block-nerve-medial branch-lumbar L3/4, L4/5, L5/S1;  Surgeon: Linwood " ALBERT Aponte MD;  Location: [facility] OR;  Service: Pain Management;  Laterality: Bilateral;    LUMBAR DISC SURGERY      PERCUTANEOUS TRANSLUMINAL ANGIOPLASTY N/A 2/15/2021    Procedure: Pta (Angioplasty, Percutaneous, Transluminal);  Surgeon: Jl Neil MD;  Location: [facility] CATH;  Service: Cardiology;  Laterality: N/A;    RADIOFREQUENCY ABLATION Right 3/1/2021    Procedure: Radiofrequency Ablation L3/4, L4/5;  Surgeon: Linwood Aponte MD;  Location: [facility] OR;  Service: Pain Management;  Laterality: Right;    RADIOFREQUENCY ABLATION Left 3/10/2021    Procedure: Radiofrequency Ablation L3/4, L4/5;  Surgeon: Linwood Aponte MD;  Location: [facility] OR;  Service: Pain Management;  Laterality: Left;    RADIOFREQUENCY ABLATION OF LUMBAR MEDIAL BRANCH NERVE AT SINGLE LEVEL Bilateral 8/23/2022    Procedure: Radiofrequency Ablation, Nerve, Spinal, Lumbar, Medial Branch, L3/4, L4/5;  Surgeon: Linwood Aponte MD;  Location: [facility] OR;  Service: Pain Management;  Laterality: Bilateral;      Family History   Problem Relation Name Age of Onset    Diabetes Mother      Heart disease Mother      Heart disease Father      Glaucoma Neg Hx      Macular degeneration Neg Hx      Retinal detachment Neg Hx       Social History[1]     Review of patient's allergies indicates:  No Known Allergies    Medications Ordered Prior to Encounter[2]      Review of Systems     Constitutional: Negative unless otherwise commented above  HENT: Negative unless otherwise commented above  Eyes: Negative unless otherwise commented above  Respiratory: Negative unless otherwise commented above  Cardiovascular: Negative unless otherwise commented above  Musculoskeletal: Negative unless otherwise commented above  Skin: Negative unless otherwise commented above  Neurological: Negative unless otherwise commented above  Hematological: Negative unless otherwise commented above  Endocrine: Negative unless otherwise commented above    OBJECTIVE:     Vital Signs (Most  "Recent)  Visit Vitals  /73 (BP Location: Left arm, Patient Position: Sitting)   Pulse 89   Temp 98.1 °F (36.7 °C) (Temporal)   Resp 20   Ht 5' 10" (1.778 m)   Wt (!) 138.3 kg (304 lb 14.3 oz)   SpO2 96%   BMI 43.75 kg/m²     5' 10" (1.778 m)  (!) 138.3 kg (304 lb 14.3 oz)     Physical Exam:    General: a/o X 3 in NAD  Eye: Extraocular movements are intact, Normal conjunctiva.  No scleral icterus  HENT: Normocephalic, Oral mucosa is moist, No pharyngeal erythema, clear oropharynx  Neck: Supple, Non-tender, No jugular venous distention.  Respiratory: decreased bs on right upper lung field  Cardiovascular: RRR  Extremities: no c/c/e  Gastrointestinal: Soft, Non-tender, Non-distended   Lymphatics: No lymphadenopathy neck, supraclavicular.   Integumentary: Warm, Dry.   Neurologic: Alert, Oriented, Normal motor function, No focal defects.       Diagnostic Results:    PFT Results  PFTs from January 15, 2024 reveal a mixed restrictive/obstructive process.  Lung volumes reveal a TLC of 4.17 L or 58% of predicted.  Spirometry reveals evidence of obstruction with an FEV1 of 1.92 L or 59% of predicted.  DLCO is normal at 20.98 or 80% of predicted.    Significant Imagin2025 CT chest:  Impression:     1. Lung-RADS Category 4X - Suspicious - consultation advised -  possible next steps  Chest CT, tissue sampling and-or PET-CT.  Right hilar fullness with near complete obliteration of the right upper lobe bronchus is new from the prior study.  Also new are multiple small nodules in the right upper lobe.  Findings may be infectious or neoplastic.  Dedicated CT chest with IV contrast should be considered versus bronchoscopy.     Test(s) Reviewed  I have personally reviewed the following in detail:  [] Chest Xray Images   [x] CT Images   [] Echocardiogram   [] Labs   [] Other:       Assessment:         ICD-10-CM ICD-9-CM   1. Atelectasis, right  J98.11 518.0   2. Abnormal CT of the chest  R93.89 793.2   3. Pulmonary " nodules  R91.8 793.19   4. Centrilobular emphysema  J43.2 492.8   5. BIB on CPAP  G47.33 327.23   6. Cigarette nicotine dependence in remission  F17.211 V15.82        Plan:   Atelectasis, right    Abnormal CT of the chest    Pulmonary nodules    Centrilobular emphysema    BIB on CPAP    Cigarette nicotine dependence in remission       Etiology includes fungal, malignancy, or perhaps bronchial stenosis given recent ablation on 2025.    Comparison of CT of the chest from 2024 to 2025 reveals new right upper lobe collapse with query underlying endobronchial lesion in the right upper lobe.    Recommed PET-CT then proceed to EBUS with biopsy of lesions in airway and abnormal mediastinal/hilar lymph nodes.  Will need cardiac clearance if ok to proceed and hold Xarelto/Plavix prior.    Discuss with Dr. Puckett/Dr. Webb to hold Plavix/Xarelto.    Currently, I am scheduled on the foor ,  and procedure time  in the morning.    Follow up in about 3 weeks (around 2025) for fu ebus.     See labs and med orders.    Medications have been reviewed and reconciled.      Portions of this note may have been created with voice recognition software.  Grammatical, syntax and spelling errors may be inevitable.               [1]   Social History  Tobacco Use    Smoking status: Former     Current packs/day: 0.00     Average packs/day: 2.0 packs/day for 56.0 years (112.0 ttl pk-yrs)     Types: Cigarettes     Start date:      Quit date:      Years since quittin.1    Smokeless tobacco: Never   Substance Use Topics    Alcohol use: Yes     Alcohol/week: 2.0 standard drinks of alcohol     Types: 2 Cans of beer per week     Comment: every other week    Drug use: Never   [2]   Current Outpatient Medications on File Prior to Visit   Medication Sig Dispense Refill    aspirin (ECOTRIN) 81 MG EC tablet Take 81 mg by mouth once daily.      atorvastatin (LIPITOR) 20 MG tablet Take 1 tablet (20 mg total) by  mouth once daily. 90 tablet 1    diclofenac sodium (VOLTAREN) 1 % Gel Apply 2 g topically 4 (four) times daily. 500 g 3    furosemide (LASIX) 40 MG tablet Take 1 tablet (40 mg total) by mouth 2 (two) times daily. 60 tablet 11    metoprolol succinate (TOPROL-XL) 50 MG 24 hr tablet Take 1 tablet (50 mg total) by mouth once daily. 90 tablet 3    potassium chloride (KLOR-CON) 10 MEQ TbSR Take 1 tablet (10 mEq total) by mouth 2 (two) times daily. 60 tablet 2    rivaroxaban (XARELTO) 20 mg Tab Take 1 tablet (20 mg total) by mouth daily with dinner or evening meal. 30 tablet 11    tiZANidine (ZANAFLEX) 4 MG tablet Take 1 tablet (4 mg total) by mouth every 8 (eight) hours as needed (for leg pain). 60 tablet 6    traZODone (DESYREL) 50 MG tablet Take 1 tablet (50 mg total) by mouth every evening. 30 tablet 2    budesonide (PULMICORT) 0.5 mg/2 mL nebulizer solution Take 2 mLs (0.5 mg total) by nebulization 2 (two) times daily. Controller (Patient not taking: Reported on 3/5/2025) 120 mL 5    levalbuterol (XOPENEX) 1.25 mg/3 mL nebulizer solution Take 3 mLs (1.25 mg total) by nebulization every 6 (six) hours as needed for Wheezing. Rescue 270 mL 11    metFORMIN (GLUCOPHAGE-XR) 500 MG ER 24hr tablet Take 1 tablet (500 mg total) by mouth daily with breakfast. (Patient not taking: Reported on 2/27/2025) 90 tablet 3    tiotropium bromide (SPIRIVA RESPIMAT) 2.5 mcg/actuation inhaler Inhale 2 puffs into the lungs Daily. Controller (Patient not taking: Reported on 2/27/2025) 4 g 11    [DISCONTINUED] lisinopriL-hydrochlorothiazide (PRINZIDE,ZESTORETIC) 20-12.5 mg per tablet Take 1 tablet by mouth 2 (two) times a day. 180 tablet 3     No current facility-administered medications on file prior to visit.

## 2025-03-11 ENCOUNTER — DOCUMENTATION ONLY (OUTPATIENT)
Facility: CLINIC | Age: 70
End: 2025-03-11
Payer: MEDICARE

## 2025-03-11 ENCOUNTER — TUMOR BOARD CONFERENCE (OUTPATIENT)
Dept: HEMATOLOGY/ONCOLOGY | Facility: CLINIC | Age: 70
End: 2025-03-11
Payer: MEDICARE

## 2025-03-11 ENCOUNTER — OFFICE VISIT (OUTPATIENT)
Facility: CLINIC | Age: 70
End: 2025-03-11
Payer: MEDICARE

## 2025-03-11 VITALS
HEART RATE: 89 BPM | HEIGHT: 70 IN | BODY MASS INDEX: 43.65 KG/M2 | OXYGEN SATURATION: 96 % | RESPIRATION RATE: 20 BRPM | TEMPERATURE: 98 F | DIASTOLIC BLOOD PRESSURE: 73 MMHG | SYSTOLIC BLOOD PRESSURE: 133 MMHG | WEIGHT: 304.88 LBS

## 2025-03-11 DIAGNOSIS — J98.11 ATELECTASIS, RIGHT: Primary | ICD-10-CM

## 2025-03-11 DIAGNOSIS — R91.8 PULMONARY NODULES: ICD-10-CM

## 2025-03-11 DIAGNOSIS — R93.89 ABNORMAL CT OF THE CHEST: ICD-10-CM

## 2025-03-11 DIAGNOSIS — G47.33 OSA ON CPAP: ICD-10-CM

## 2025-03-11 DIAGNOSIS — J43.2 CENTRILOBULAR EMPHYSEMA: ICD-10-CM

## 2025-03-11 DIAGNOSIS — F17.211 CIGARETTE NICOTINE DEPENDENCE IN REMISSION: ICD-10-CM

## 2025-03-11 PROCEDURE — 3288F FALL RISK ASSESSMENT DOCD: CPT | Mod: CPTII,S$GLB,, | Performed by: INTERNAL MEDICINE

## 2025-03-11 PROCEDURE — 1159F MED LIST DOCD IN RCRD: CPT | Mod: CPTII,S$GLB,, | Performed by: INTERNAL MEDICINE

## 2025-03-11 PROCEDURE — 3044F HG A1C LEVEL LT 7.0%: CPT | Mod: CPTII,S$GLB,, | Performed by: INTERNAL MEDICINE

## 2025-03-11 PROCEDURE — 99999 PR PBB SHADOW E&M-EST. PATIENT-LVL IV: CPT | Mod: PBBFAC,,, | Performed by: INTERNAL MEDICINE

## 2025-03-11 PROCEDURE — 3008F BODY MASS INDEX DOCD: CPT | Mod: CPTII,S$GLB,, | Performed by: INTERNAL MEDICINE

## 2025-03-11 PROCEDURE — 99204 OFFICE O/P NEW MOD 45 MIN: CPT | Mod: S$GLB,,, | Performed by: INTERNAL MEDICINE

## 2025-03-11 PROCEDURE — 1126F AMNT PAIN NOTED NONE PRSNT: CPT | Mod: CPTII,S$GLB,, | Performed by: INTERNAL MEDICINE

## 2025-03-11 PROCEDURE — 3078F DIAST BP <80 MM HG: CPT | Mod: CPTII,S$GLB,, | Performed by: INTERNAL MEDICINE

## 2025-03-11 PROCEDURE — 3075F SYST BP GE 130 - 139MM HG: CPT | Mod: CPTII,S$GLB,, | Performed by: INTERNAL MEDICINE

## 2025-03-11 PROCEDURE — 1101F PT FALLS ASSESS-DOCD LE1/YR: CPT | Mod: CPTII,S$GLB,, | Performed by: INTERNAL MEDICINE

## 2025-03-11 NOTE — PROGRESS NOTES
Met with patient in pulmonary clinic today.  Explained my role as navigator.  Reviewed plan of care and answered questions.    Plan is for PET, bronch and cardiac clearance.   My contact information was provided and pt was encouraged to call with any questions or concerns.  Pt verbalized understanding.

## 2025-03-11 NOTE — PROGRESS NOTES
[facility]      THORACIC MULTIDISCIPLINARY TUMOR BOARD  PATIENT REVIEW FORM  ___________________________________________________________________    CLINIC #: [id number]  DATE: 03/11/2025    TUMOR SITE:   Right hilar fullness with near complete obliteration of the right upper lobe bronchus   Also new are multiple small nodules in the right upper lobe     Presenting Hospital / Clinic: [facility]     Virtual Tumor Board Conference: In person       PRESENTER:   Presenter: Dr. Sosa Nam     Specialties Present: Medical Oncology; Hematology; Radiation Oncology; Surgical Oncology; Pathology; Navigation; Integrative Oncology; Research; Radiology; Pulmonology       PATIENT SUMMARY:   Patient is a 69 y.o. male presents for evaluation. Pulmonary evaluation for pulmonary nodules referred by Dr. Macias.   Mr. Duke is a 68 yo male ex-smoker having 2 ppd for 34 years - quit 2 years ago - with past medical history HTN, DM2 not on insulin, left eye injury, appendectomy, cervical surgery 2016, CAD with ACB X 3 03/2024 by Dr. Armando Webb, afib s/p ablation 01/07/2025 Dr. Puckett, BIB on CPAP, COPD (spiriva  and budesonide as maintenance and levalbuterol prn), PAD with stent in left leg - on Xarelto referred for new RUL collapse.   Symptoms include LAWSON when walking from parking lot to office.  No hemoptysis.  No weight loss.  No history of cancer  Some wheezing, no chest tightness.  No productive cough - just has sinus drainage.  No heartburn.  No dysphagia.     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  Lives with his brother.  No history of asbestos exposure.     CT of the chest from August 2024 compared to CT of the chest from February 27, 2025 revealing new right upper lobe collapse and loss of right upper lobe airway.       Patient is adamant that he is not  interested in chemotherapy, immunotherapy, or radiation.     No family history of cancer just heart disease.    Background Information  Patient Status: a new patient  Reason for Consultation: Initial Presentation  Treatment to Date: None         BOARD RECOMMENDATIONS:   --Obtain PET scan and cardiac clearance and plan for EBUS           [x] [facility] Treatment Guidelines reviewed and care planned is consistent with guidelines.         (i.e., NCCN, NCI, PD, ACO, AUA, etc.)    PRESENTATION AT CANCER CONFERENCE:   Presentation at Cancer Conference: Prospective       CLINICAL TRIAL ELIGIBILITY:   Clinical Trial Eligibility  Clinical Trial Eligibility: Not discussed         Kenyatta Mendoza

## 2025-03-26 ENCOUNTER — TELEPHONE (OUTPATIENT)
Dept: SURGERY | Facility: HOSPITAL | Age: 70
End: 2025-03-26
Payer: MEDICARE

## 2025-03-26 ENCOUNTER — TELEPHONE (OUTPATIENT)
Dept: CARDIOLOGY | Facility: CLINIC | Age: 70
End: 2025-03-26
Payer: MEDICARE

## 2025-03-26 NOTE — TELEPHONE ENCOUNTER
"Pt is scheduled for [facility] on Mon., 3/31/2025. Pt states "I am not going to be able to be there right now - I had an accident & fell & I have a couple of other injuries right now". Please call pt to reschedule. Thank you.   "

## 2025-03-26 NOTE — TELEPHONE ENCOUNTER
Spoke with patient and he stated he needs to postpone JOHN because he had a recent fall. He will see his PCP and will get back with us to reschedule.

## 2025-03-27 ENCOUNTER — TELEPHONE (OUTPATIENT)
Facility: CLINIC | Age: 70
End: 2025-03-27
Payer: MEDICARE

## 2025-03-31 DIAGNOSIS — I25.10 CORONARY ARTERY DISEASE, UNSPECIFIED VESSEL OR LESION TYPE, UNSPECIFIED WHETHER ANGINA PRESENT, UNSPECIFIED WHETHER NATIVE OR TRANSPLANTED HEART: ICD-10-CM

## 2025-03-31 DIAGNOSIS — I48.19 PERSISTENT ATRIAL FIBRILLATION: ICD-10-CM

## 2025-03-31 DIAGNOSIS — I10 PRIMARY HYPERTENSION: ICD-10-CM

## 2025-03-31 RX ORDER — POTASSIUM CHLORIDE 750 MG/1
10 TABLET, EXTENDED RELEASE ORAL 2 TIMES DAILY
Qty: 60 TABLET | Refills: 2 | Status: SHIPPED | OUTPATIENT
Start: 2025-03-31

## 2025-04-02 ENCOUNTER — TELEPHONE (OUTPATIENT)
Facility: CLINIC | Age: 70
End: 2025-04-02
Payer: MEDICARE

## 2025-04-02 NOTE — TELEPHONE ENCOUNTER
Spoke with pt in regards to canceled PET scan. Pt states he had a fall and was unable to come to visit. Pt states he has a appt with PCP on 4/11 and would like to wait until after he sees her to rescheduled. Advised pt I would f/u with him after PCP appt.

## 2025-04-15 ENCOUNTER — PATIENT MESSAGE (OUTPATIENT)
Dept: HEMATOLOGY/ONCOLOGY | Facility: CLINIC | Age: 70
End: 2025-04-15
Payer: MEDICARE

## 2025-04-21 ENCOUNTER — TELEPHONE (OUTPATIENT)
Dept: GASTROENTEROLOGY | Facility: CLINIC | Age: 70
End: 2025-04-21
Payer: MEDICARE

## 2025-04-21 NOTE — TELEPHONE ENCOUNTER
Attempted to reach pt to schedule scope from case request. Left VM, call back number provided. I-MD message sent.

## 2025-05-07 ENCOUNTER — TELEPHONE (OUTPATIENT)
Facility: CLINIC | Age: 70
End: 2025-05-07
Payer: MEDICARE

## 2025-05-20 ENCOUNTER — TELEPHONE (OUTPATIENT)
Dept: ORTHOPEDICS | Facility: CLINIC | Age: 70
End: 2025-05-20
Payer: MEDICARE

## 2025-05-20 NOTE — TELEPHONE ENCOUNTER
Referral received from [facility] ER, called and left [facility] for pt informing his appointment is scheduled for tomorrow 5/21 @ 1:00 with Jose Donnelly PA-C for right wrist fx.

## 2025-05-20 NOTE — TELEPHONE ENCOUNTER
----- Message from Med Assistant Chidi sent at 5/20/2025  2:48 PM CDT -----  Contact: Chidi/Nadiya  Referral in system from [facility] with a note for patient to see Dr. Leonard.  Note from ER reads: Follow-Ups: Schedule an appointment with Mark Leonard MD (Hand Surgery); follow up in clinic today or tomorrow.Thanks,Chidi

## 2025-05-21 ENCOUNTER — OFFICE VISIT (OUTPATIENT)
Dept: ORTHOPEDICS | Facility: CLINIC | Age: 70
End: 2025-05-21
Payer: MEDICARE

## 2025-05-21 DIAGNOSIS — S52.571A OTHER CLOSED INTRA-ARTICULAR FRACTURE OF DISTAL END OF RIGHT RADIUS, INITIAL ENCOUNTER: Primary | ICD-10-CM

## 2025-05-21 DIAGNOSIS — S52.691A OTHER CLOSED FRACTURE OF DISTAL END OF RIGHT ULNA, INITIAL ENCOUNTER: ICD-10-CM

## 2025-05-21 PROCEDURE — 1125F AMNT PAIN NOTED PAIN PRSNT: CPT | Mod: CPTII,S$GLB,, | Performed by: PHYSICIAN ASSISTANT

## 2025-05-21 PROCEDURE — 99214 OFFICE O/P EST MOD 30 MIN: CPT | Mod: S$GLB,,, | Performed by: PHYSICIAN ASSISTANT

## 2025-05-21 PROCEDURE — 1100F PTFALLS ASSESS-DOCD GE2>/YR: CPT | Mod: CPTII,S$GLB,, | Performed by: PHYSICIAN ASSISTANT

## 2025-05-21 PROCEDURE — 3044F HG A1C LEVEL LT 7.0%: CPT | Mod: CPTII,S$GLB,, | Performed by: PHYSICIAN ASSISTANT

## 2025-05-21 PROCEDURE — 99999 PR PBB SHADOW E&M-EST. PATIENT-LVL V: CPT | Mod: PBBFAC,,, | Performed by: PHYSICIAN ASSISTANT

## 2025-05-21 PROCEDURE — 3288F FALL RISK ASSESSMENT DOCD: CPT | Mod: CPTII,S$GLB,, | Performed by: PHYSICIAN ASSISTANT

## 2025-05-21 PROCEDURE — 1160F RVW MEDS BY RX/DR IN RCRD: CPT | Mod: CPTII,S$GLB,, | Performed by: PHYSICIAN ASSISTANT

## 2025-05-21 PROCEDURE — 1159F MED LIST DOCD IN RCRD: CPT | Mod: CPTII,S$GLB,, | Performed by: PHYSICIAN ASSISTANT

## 2025-05-21 RX ORDER — CEFAZOLIN SODIUM 2 G/50ML
2 SOLUTION INTRAVENOUS
OUTPATIENT
Start: 2025-05-21

## 2025-05-21 RX ORDER — MUPIROCIN 20 MG/G
OINTMENT TOPICAL
OUTPATIENT
Start: 2025-05-21

## 2025-05-21 NOTE — H&P (VIEW-ONLY)
5/21/2025    HPI:  Bossman Duke is a 69 y.o. male, who presents to clinic today for evaluation of his right wrist fractures.  States 2 days ago he tripped and fell off his carport and landed on his outstretched right hand/fist.  States immediate pain, swelling, difficulty using the wrist.  States that has prompted her to follow up with the emergency department the next day.  States x-rays were taken which showed fractures of the wrist.  States a reduction was performed and he was placed in a splint.  States he was instructed to follow up with our clinic.  Denies any other complaints at this time.    PMHX:  Past Medical History:   Diagnosis Date    Anticoagulant long-term use     Arthritis     COPD (chronic obstructive pulmonary disease)     Coronary artery disease     Diabetes mellitus     Hypertension     PAD (peripheral artery disease)     PUD (peptic ulcer disease)     Sleep apnea     CPAP    Vertigo        PSHX:  Past Surgical History:   Procedure Laterality Date    ABLATION AND RECONSTRUCTION, CARDIAC ATRIUM, WITH LIMITED TISSUE ABLATION N/A 5/20/2024    Procedure: ABLATION AND RECONSTRUCTION, CARDIAC ATRIUM, WITH LIMITED TISSUE ABLATION;  Surgeon: Migdalia Roberts MD;  Location: [facility] OR;  Service: Cardiothoracic;  Laterality: N/A;    ABLATION, ATRIAL FIBRILLATION, CRYO  1/7/2025    Procedure: Ablation A-Fib Re-Do RFA;  Surgeon: Pritesh Puckett MD;  Location: [facility] CATH;  Service: Cardiology;;    ANGIOGRAM, CORONARY, WITH LEFT HEART CATHETERIZATION  4/24/2024    Procedure: Left Heart Cath;  Surgeon: Jl Neil MD;  Location: [facility] CATH;  Service: Cardiology;;    APPENDECTOMY      CERVICAL SPINE SURGERY      CORONARY ARTERY BYPASS GRAFT (CABG) N/A 5/20/2024    Procedure: CORONARY ARTERY BYPASS GRAFT (CABG) x3;  Surgeon: Migdalia Roberts MD;  Location: [facility] OR;  Service: Cardiothoracic;  Laterality: N/A;    ECHOCARDIOGRAM,TRANSESOPHAGEAL N/A 5/20/2024    Procedure: Transesophageal echo (JOHN)  "intra-procedure log documentation;  Surgeon: Migdalia Roberts MD;  Location: [facility] OR;  Service: Cardiothoracic;  Laterality: N/A;    ECHOCARDIOGRAM,TRANSESOPHAGEAL  1/7/2025    Procedure: (JOHN) intra-procedure;  Surgeon: Pritesh Puckett MD;  Location: [facility] CATH;  Service: Cardiology;;    ENDOSCOPIC HARVEST OF VEIN Right 5/20/2024    Procedure: HARVEST-VEIN-ENDOVASCULAR;  Surgeon: Migdalia Roberts MD;  Location: [facility] OR;  Service: Cardiothoracic;  Laterality: Right;    EPIDURAL STEROID INJECTION INTO LUMBAR SPINE N/A 10/5/2022    Procedure: Injection-steroid-epidural-lumbar L3/4;  Surgeon: Linwood Aponte MD;  Location: [facility] OR;  Service: Pain Management;  Laterality: N/A;    EXCLUSION, LEFT ATRIAL APPENDAGE, OPEN, AS PART OF OPEN CHEST SURGERY Left 5/20/2024    Procedure: EXCLUSION, LEFT ATRIAL APPENDAGE, OPEN, AS PART OF OPEN CHEST SURGERY;  Surgeon: Migdalia Roberts MD;  Location: [facility] OR;  Service: Cardiothoracic;  Laterality: Left;    EYE SURGERY Left     "as  a child"    INJECTION OF ANESTHETIC AGENT AROUND MEDIAL BRANCH NERVES INNERVATING LUMBAR FACET JOINT Bilateral 2/9/2021    Procedure: Block-nerve-medial branch-lumbar L3/4, L4/5, L5/S1;  Surgeon: Linwood Aponte MD;  Location: [facility] OR;  Service: Pain Management;  Laterality: Bilateral;    LUMBAR DISC SURGERY      PERCUTANEOUS TRANSLUMINAL ANGIOPLASTY N/A 2/15/2021    Procedure: Pta (Angioplasty, Percutaneous, Transluminal);  Surgeon: Jl Neil MD;  Location: [facility] CATH;  Service: Cardiology;  Laterality: N/A;    RADIOFREQUENCY ABLATION Right 3/1/2021    Procedure: Radiofrequency Ablation L3/4, L4/5;  Surgeon: Linwood Aponte MD;  Location: [facility] OR;  Service: Pain Management;  Laterality: Right;    RADIOFREQUENCY ABLATION Left 3/10/2021    Procedure: Radiofrequency Ablation L3/4, L4/5;  Surgeon: Linwood Aponte MD;  Location: [facility] OR;  Service: Pain Management;  Laterality: Left;    RADIOFREQUENCY ABLATION OF LUMBAR MEDIAL BRANCH NERVE AT SINGLE LEVEL " Bilateral 2022    Procedure: Radiofrequency Ablation, Nerve, Spinal, Lumbar, Medial Branch, L3/4, L4/5;  Surgeon: Linwood Aponte MD;  Location: [facility] OR;  Service: Pain Management;  Laterality: Bilateral;       FMHX:  Family History   Problem Relation Name Age of Onset    Diabetes Mother      Heart disease Mother      Heart disease Father      Glaucoma Neg Hx      Macular degeneration Neg Hx      Retinal detachment Neg Hx         SOCHX:  Social History     Tobacco Use    Smoking status: Former     Current packs/day: 0.00     Average packs/day: 2.0 packs/day for 56.0 years (112.0 ttl pk-yrs)     Types: Cigarettes     Start date:      Quit date:      Years since quittin.3    Smokeless tobacco: Never   Substance Use Topics    Alcohol use: Yes     Alcohol/week: 2.0 standard drinks of alcohol     Types: 2 Cans of beer per week     Comment: every other week       ALLERGIES:  Patient has no known allergies.    CURRENT MEDICATIONS:  Medications Ordered Prior to Encounter[1]    REVIEW OF SYSTEMS:  Review of Systems Complete; Negative, unless noted above.    GENERAL PHYSICAL EXAM:   There were no vitals taken for this visit.   GEN: well developed, well nourished, no acute distress   PULM: No wheezing, no respiratory distress   CV: RRR    ORTHO EXAM:   Examination of the right wrist/arm reveals a sugar-tong splint using Orthoglass wrapped in Ace wrap.  Able to flex extend the exposed fingers.  Sensation is grossly intact of the exposed fingers.  Capillary refill less than 2 seconds.    RADIOLOGY:   X-rays of the right wrist were taken yesterday.  X-rays reviewed by myself in clinic today.  Imaging showed the presence of a displaced comminuted intra-articular distal radius fracture of the right wrist.  Presence of a comminuted displaced fracture of the shaft of the distal ulna of the right wrist.  No other significant bony abnormalities noted.    ASSESSMENT:   Displaced intra-articular comminuted distal radius  and distal ulna shaft fractures of the right wrist    PLAN:  1. I discussed with Bossman Duke the wrist fracture pathology and treatment options in detail during today's visit.  We did discuss that since his fractures are significantly displaced that the best course of action this time we would be to proceed with surgical intervention.  He verbally agreed with the treatment plan.      2. We discussed the risks and benefits of performing surgery, as well as not performing surgery.  All questions were addressed and answered in clinic today.  Surgical consents were clinic today today.      3. We will proceed with a right distal radius and right distal ulna shaft fractures open reduction internal fixations under general anesthesia with a single-shot supraclavicular Exparel block.    4. He was instructed to stop taking his Xarelto 2 days prior to surgery.  He was instructed to resume taking his Xarelto the day after his surgery.  He verbalized understanding.      5. I would like him follow up in clinic 2 weeks postoperatively.  He was instructed to contact the clinic for any problems or concerns in the interim.           [1]   Current Outpatient Medications on File Prior to Visit   Medication Sig Dispense Refill    aspirin (ECOTRIN) 81 MG EC tablet Take 81 mg by mouth once daily.      atorvastatin (LIPITOR) 20 MG tablet Take 1 tablet (20 mg total) by mouth once daily. 90 tablet 1    baclofen (LIORESAL) 5 mg Tab tablet Take 1 tablet (5 mg total) by mouth 2 (two) times daily as needed (muscle spasms). 60 tablet 2    diclofenac sodium (VOLTAREN) 1 % Gel Apply 2 g topically 4 (four) times daily. 500 g 3    furosemide (LASIX) 40 MG tablet Take 1 tablet (40 mg total) by mouth 2 (two) times daily. 60 tablet 11    HYDROcodone-acetaminophen (NORCO) 5-325 mg per tablet Take 1 tablet by mouth every 4 (four) hours as needed for Pain. 12 tablet 0    lubiprostone (AMITIZA) 8 MCG Cap Take 1 capsule (8 mcg total) by mouth 2 (two) times  daily with meals. 60 capsule 2    metoprolol succinate (TOPROL-XL) 50 MG 24 hr tablet Take 1 tablet (50 mg total) by mouth once daily. 90 tablet 3    potassium chloride (KLOR-CON) 10 MEQ TbSR Take 1 tablet (10 mEq total) by mouth 2 (two) times daily. 60 tablet 2    rivaroxaban (XARELTO) 20 mg Tab Take 1 tablet (20 mg total) by mouth daily with dinner or evening meal. 30 tablet 11    tiotropium bromide (SPIRIVA RESPIMAT) 2.5 mcg/actuation inhaler Inhale 2 puffs into the lungs Daily. Controller 4 g 11    traZODone (DESYREL) 50 MG tablet Take 1 tablet (50 mg total) by mouth every evening. 30 tablet 2    budesonide (PULMICORT) 0.5 mg/2 mL nebulizer solution Take 2 mLs (0.5 mg total) by nebulization 2 (two) times daily. Controller (Patient not taking: Reported on 3/5/2025) 120 mL 5    levalbuterol (XOPENEX) 1.25 mg/3 mL nebulizer solution Take 3 mLs (1.25 mg total) by nebulization every 6 (six) hours as needed for Wheezing. Rescue 270 mL 11    [DISCONTINUED] lisinopriL-hydrochlorothiazide (PRINZIDE,ZESTORETIC) 20-12.5 mg per tablet Take 1 tablet by mouth 2 (two) times a day. 180 tablet 3    [DISCONTINUED] metFORMIN (GLUCOPHAGE-XR) 500 MG ER 24hr tablet Take 1 tablet (500 mg total) by mouth daily with breakfast. (Patient not taking: Reported on 2/27/2025) 90 tablet 3     Current Facility-Administered Medications on File Prior to Visit   Medication Dose Route Frequency Provider Last Rate Last Admin    [COMPLETED] oxyCODONE-acetaminophen 5-325 mg per tablet 1 tablet  1 tablet Oral ED 1 Time Venus Kaba MD   1 tablet at 05/20/25 1446    [COMPLETED] propofol (DIPRIVAN) 10 mg/mL IVP  100 mg Intravenous Once Venus Kaba MD   80 mg at 05/20/25 1345    [DISCONTINUED] sodium chloride 0.9% bolus 500 mL 500 mL  500 mL Intravenous ED 1 Time Venus Kaba MD

## 2025-05-21 NOTE — PATIENT INSTRUCTIONS
Surgery Instructions:     Your surgery is scheduled on 05/27/25 at the surgery center: [address], 1st floor, second entrance.    The pre-op department will be in contact with you prior to your procedure to review medications and instructions.       Nothing to eat or drink after midnight prior to day of surgery.    You should STOP taking any blood thinners 5 days prior to surgery.     If needed, please obtain medical and cardiac clearance as advised prior to surgery. All preoperative testing should be done as soon as possible as it may be needed to obtain clearance.    The surgery center will contact you the day prior to surgery to advise you of your arrival time for surgery.     Your post op appointment is scheduled on 06/11/25 @ 2:20 pm.    *Please answer the post op Patient IQ questions. They will be sent via email or text message at baseline, 3 months, 6 months, 1 year and 2 years after your surgery*

## 2025-05-21 NOTE — PROGRESS NOTES
5/21/2025    HPI:  Bossman Duke is a 69 y.o. male, who presents to clinic today for evaluation of his right wrist fractures.  States 2 days ago he tripped and fell off his carport and landed on his outstretched right hand/fist.  States immediate pain, swelling, difficulty using the wrist.  States that has prompted her to follow up with the emergency department the next day.  States x-rays were taken which showed fractures of the wrist.  States a reduction was performed and he was placed in a splint.  States he was instructed to follow up with our clinic.  Denies any other complaints at this time.    PMHX:  Past Medical History:   Diagnosis Date    Anticoagulant long-term use     Arthritis     COPD (chronic obstructive pulmonary disease)     Coronary artery disease     Diabetes mellitus     Hypertension     PAD (peripheral artery disease)     PUD (peptic ulcer disease)     Sleep apnea     CPAP    Vertigo        PSHX:  Past Surgical History:   Procedure Laterality Date    ABLATION AND RECONSTRUCTION, CARDIAC ATRIUM, WITH LIMITED TISSUE ABLATION N/A 5/20/2024    Procedure: ABLATION AND RECONSTRUCTION, CARDIAC ATRIUM, WITH LIMITED TISSUE ABLATION;  Surgeon: Migdalia Roberts MD;  Location: [facility] OR;  Service: Cardiothoracic;  Laterality: N/A;    ABLATION, ATRIAL FIBRILLATION, CRYO  1/7/2025    Procedure: Ablation A-Fib Re-Do RFA;  Surgeon: Pritesh Puckett MD;  Location: [facility] CATH;  Service: Cardiology;;    ANGIOGRAM, CORONARY, WITH LEFT HEART CATHETERIZATION  4/24/2024    Procedure: Left Heart Cath;  Surgeon: Jl Neil MD;  Location: [facility] CATH;  Service: Cardiology;;    APPENDECTOMY      CERVICAL SPINE SURGERY      CORONARY ARTERY BYPASS GRAFT (CABG) N/A 5/20/2024    Procedure: CORONARY ARTERY BYPASS GRAFT (CABG) x3;  Surgeon: Migdalia Roberts MD;  Location: [facility] OR;  Service: Cardiothoracic;  Laterality: N/A;    ECHOCARDIOGRAM,TRANSESOPHAGEAL N/A 5/20/2024    Procedure: Transesophageal echo (JOHN)  "intra-procedure log documentation;  Surgeon: Migdalia Roberts MD;  Location: [facility] OR;  Service: Cardiothoracic;  Laterality: N/A;    ECHOCARDIOGRAM,TRANSESOPHAGEAL  1/7/2025    Procedure: (JOHN) intra-procedure;  Surgeon: Pritesh Puckett MD;  Location: [facility] CATH;  Service: Cardiology;;    ENDOSCOPIC HARVEST OF VEIN Right 5/20/2024    Procedure: HARVEST-VEIN-ENDOVASCULAR;  Surgeon: Migdalia Roberts MD;  Location: [facility] OR;  Service: Cardiothoracic;  Laterality: Right;    EPIDURAL STEROID INJECTION INTO LUMBAR SPINE N/A 10/5/2022    Procedure: Injection-steroid-epidural-lumbar L3/4;  Surgeon: Linwood Aponte MD;  Location: [facility] OR;  Service: Pain Management;  Laterality: N/A;    EXCLUSION, LEFT ATRIAL APPENDAGE, OPEN, AS PART OF OPEN CHEST SURGERY Left 5/20/2024    Procedure: EXCLUSION, LEFT ATRIAL APPENDAGE, OPEN, AS PART OF OPEN CHEST SURGERY;  Surgeon: Migdalia Roberts MD;  Location: [facility] OR;  Service: Cardiothoracic;  Laterality: Left;    EYE SURGERY Left     "as  a child"    INJECTION OF ANESTHETIC AGENT AROUND MEDIAL BRANCH NERVES INNERVATING LUMBAR FACET JOINT Bilateral 2/9/2021    Procedure: Block-nerve-medial branch-lumbar L3/4, L4/5, L5/S1;  Surgeon: Linwood Aponte MD;  Location: [facility] OR;  Service: Pain Management;  Laterality: Bilateral;    LUMBAR DISC SURGERY      PERCUTANEOUS TRANSLUMINAL ANGIOPLASTY N/A 2/15/2021    Procedure: Pta (Angioplasty, Percutaneous, Transluminal);  Surgeon: Jl Neil MD;  Location: [facility] CATH;  Service: Cardiology;  Laterality: N/A;    RADIOFREQUENCY ABLATION Right 3/1/2021    Procedure: Radiofrequency Ablation L3/4, L4/5;  Surgeon: Linwood Aponte MD;  Location: [facility] OR;  Service: Pain Management;  Laterality: Right;    RADIOFREQUENCY ABLATION Left 3/10/2021    Procedure: Radiofrequency Ablation L3/4, L4/5;  Surgeon: Linwood Aponte MD;  Location: [facility] OR;  Service: Pain Management;  Laterality: Left;    RADIOFREQUENCY ABLATION OF LUMBAR MEDIAL BRANCH NERVE AT SINGLE LEVEL " Bilateral 2022    Procedure: Radiofrequency Ablation, Nerve, Spinal, Lumbar, Medial Branch, L3/4, L4/5;  Surgeon: Linwood Aponte MD;  Location: [facility] OR;  Service: Pain Management;  Laterality: Bilateral;       FMHX:  Family History   Problem Relation Name Age of Onset    Diabetes Mother      Heart disease Mother      Heart disease Father      Glaucoma Neg Hx      Macular degeneration Neg Hx      Retinal detachment Neg Hx         SOCHX:  Social History     Tobacco Use    Smoking status: Former     Current packs/day: 0.00     Average packs/day: 2.0 packs/day for 56.0 years (112.0 ttl pk-yrs)     Types: Cigarettes     Start date:      Quit date:      Years since quittin.3    Smokeless tobacco: Never   Substance Use Topics    Alcohol use: Yes     Alcohol/week: 2.0 standard drinks of alcohol     Types: 2 Cans of beer per week     Comment: every other week       ALLERGIES:  Patient has no known allergies.    CURRENT MEDICATIONS:  Medications Ordered Prior to Encounter[1]    REVIEW OF SYSTEMS:  Review of Systems Complete; Negative, unless noted above.    GENERAL PHYSICAL EXAM:   There were no vitals taken for this visit.   GEN: well developed, well nourished, no acute distress   PULM: No wheezing, no respiratory distress   CV: RRR    ORTHO EXAM:   Examination of the right wrist/arm reveals a sugar-tong splint using Orthoglass wrapped in Ace wrap.  Able to flex extend the exposed fingers.  Sensation is grossly intact of the exposed fingers.  Capillary refill less than 2 seconds.    RADIOLOGY:   X-rays of the right wrist were taken yesterday.  X-rays reviewed by myself in clinic today.  Imaging showed the presence of a displaced comminuted intra-articular distal radius fracture of the right wrist.  Presence of a comminuted displaced fracture of the shaft of the distal ulna of the right wrist.  No other significant bony abnormalities noted.    ASSESSMENT:   Displaced intra-articular comminuted distal radius  and distal ulna shaft fractures of the right wrist    PLAN:  1. I discussed with Bossman Duke the wrist fracture pathology and treatment options in detail during today's visit.  We did discuss that since his fractures are significantly displaced that the best course of action this time we would be to proceed with surgical intervention.  He verbally agreed with the treatment plan.      2. We discussed the risks and benefits of performing surgery, as well as not performing surgery.  All questions were addressed and answered in clinic today.  Surgical consents were clinic today today.      3. We will proceed with a right distal radius and right distal ulna shaft fractures open reduction internal fixations under general anesthesia with a single-shot supraclavicular Exparel block.    4. He was instructed to stop taking his Xarelto 2 days prior to surgery.  He was instructed to resume taking his Xarelto the day after his surgery.  He verbalized understanding.      5. I would like him follow up in clinic 2 weeks postoperatively.  He was instructed to contact the clinic for any problems or concerns in the interim.           [1]   Current Outpatient Medications on File Prior to Visit   Medication Sig Dispense Refill    aspirin (ECOTRIN) 81 MG EC tablet Take 81 mg by mouth once daily.      atorvastatin (LIPITOR) 20 MG tablet Take 1 tablet (20 mg total) by mouth once daily. 90 tablet 1    baclofen (LIORESAL) 5 mg Tab tablet Take 1 tablet (5 mg total) by mouth 2 (two) times daily as needed (muscle spasms). 60 tablet 2    diclofenac sodium (VOLTAREN) 1 % Gel Apply 2 g topically 4 (four) times daily. 500 g 3    furosemide (LASIX) 40 MG tablet Take 1 tablet (40 mg total) by mouth 2 (two) times daily. 60 tablet 11    HYDROcodone-acetaminophen (NORCO) 5-325 mg per tablet Take 1 tablet by mouth every 4 (four) hours as needed for Pain. 12 tablet 0    lubiprostone (AMITIZA) 8 MCG Cap Take 1 capsule (8 mcg total) by mouth 2 (two) times  daily with meals. 60 capsule 2    metoprolol succinate (TOPROL-XL) 50 MG 24 hr tablet Take 1 tablet (50 mg total) by mouth once daily. 90 tablet 3    potassium chloride (KLOR-CON) 10 MEQ TbSR Take 1 tablet (10 mEq total) by mouth 2 (two) times daily. 60 tablet 2    rivaroxaban (XARELTO) 20 mg Tab Take 1 tablet (20 mg total) by mouth daily with dinner or evening meal. 30 tablet 11    tiotropium bromide (SPIRIVA RESPIMAT) 2.5 mcg/actuation inhaler Inhale 2 puffs into the lungs Daily. Controller 4 g 11    traZODone (DESYREL) 50 MG tablet Take 1 tablet (50 mg total) by mouth every evening. 30 tablet 2    budesonide (PULMICORT) 0.5 mg/2 mL nebulizer solution Take 2 mLs (0.5 mg total) by nebulization 2 (two) times daily. Controller (Patient not taking: Reported on 3/5/2025) 120 mL 5    levalbuterol (XOPENEX) 1.25 mg/3 mL nebulizer solution Take 3 mLs (1.25 mg total) by nebulization every 6 (six) hours as needed for Wheezing. Rescue 270 mL 11    [DISCONTINUED] lisinopriL-hydrochlorothiazide (PRINZIDE,ZESTORETIC) 20-12.5 mg per tablet Take 1 tablet by mouth 2 (two) times a day. 180 tablet 3    [DISCONTINUED] metFORMIN (GLUCOPHAGE-XR) 500 MG ER 24hr tablet Take 1 tablet (500 mg total) by mouth daily with breakfast. (Patient not taking: Reported on 2/27/2025) 90 tablet 3     Current Facility-Administered Medications on File Prior to Visit   Medication Dose Route Frequency Provider Last Rate Last Admin    [COMPLETED] oxyCODONE-acetaminophen 5-325 mg per tablet 1 tablet  1 tablet Oral ED 1 Time Venus Kaba MD   1 tablet at 05/20/25 1446    [COMPLETED] propofol (DIPRIVAN) 10 mg/mL IVP  100 mg Intravenous Once Venus Kaba MD   80 mg at 05/20/25 1345    [DISCONTINUED] sodium chloride 0.9% bolus 500 mL 500 mL  500 mL Intravenous ED 1 Time Venus Kaba MD

## 2025-05-26 ENCOUNTER — ANESTHESIA EVENT (OUTPATIENT)
Dept: SURGERY | Facility: HOSPITAL | Age: 70
End: 2025-05-26
Payer: MEDICARE

## 2025-05-27 ENCOUNTER — HOSPITAL ENCOUNTER (OUTPATIENT)
Facility: HOSPITAL | Age: 70
Discharge: HOME | End: 2025-05-27
Attending: ORTHOPAEDIC SURGERY | Admitting: ORTHOPAEDIC SURGERY
Payer: MEDICARE

## 2025-05-27 ENCOUNTER — HOSPITAL ENCOUNTER (OUTPATIENT)
Dept: RADIOLOGY | Facility: HOSPITAL | Age: 70
Discharge: HOME | End: 2025-05-27
Attending: ORTHOPAEDIC SURGERY | Admitting: ORTHOPAEDIC SURGERY
Payer: MEDICARE

## 2025-05-27 ENCOUNTER — ANESTHESIA (OUTPATIENT)
Dept: SURGERY | Facility: HOSPITAL | Age: 70
End: 2025-05-27
Payer: MEDICARE

## 2025-05-27 DIAGNOSIS — S52.571A OTHER CLOSED INTRA-ARTICULAR FRACTURE OF DISTAL END OF RIGHT RADIUS, INITIAL ENCOUNTER: ICD-10-CM

## 2025-05-27 DIAGNOSIS — S52.691A OTHER CLOSED FRACTURE OF DISTAL END OF RIGHT ULNA, INITIAL ENCOUNTER: ICD-10-CM

## 2025-05-27 DIAGNOSIS — M25.531 RIGHT WRIST PAIN: ICD-10-CM

## 2025-05-27 PROBLEM — S52.501A CLOSED FRACTURE OF RIGHT DISTAL RADIUS: Status: ACTIVE | Noted: 2025-05-27

## 2025-05-27 PROBLEM — S52.601A CLOSED FRACTURE OF LOWER END OF RIGHT ULNA: Status: ACTIVE | Noted: 2025-05-27

## 2025-05-27 PROCEDURE — 64415 NJX AA&/STRD BRCH PLXS IMG: CPT | Mod: PO | Performed by: ANESTHESIOLOGY

## 2025-05-27 PROCEDURE — 63600175 PHARM REV CODE 636 W HCPCS: Mod: JZ,TB,PO | Performed by: ANESTHESIOLOGY

## 2025-05-27 PROCEDURE — 36000710: Mod: PO | Performed by: ORTHOPAEDIC SURGERY

## 2025-05-27 PROCEDURE — 37000008 HC ANESTHESIA 1ST 15 MINUTES: Mod: PO | Performed by: ORTHOPAEDIC SURGERY

## 2025-05-27 PROCEDURE — 25000003 PHARM REV CODE 250: Mod: PO | Performed by: PHYSICIAN ASSISTANT

## 2025-05-27 PROCEDURE — 76000 FLUOROSCOPY <1 HR PHYS/QHP: CPT | Mod: TC,PO

## 2025-05-27 PROCEDURE — 27200750 HC INSULATED NEEDLE/ STIMUPLEX: Mod: PO | Performed by: ANESTHESIOLOGY

## 2025-05-27 PROCEDURE — 25000003 PHARM REV CODE 250: Mod: PO | Performed by: NURSE ANESTHETIST, CERTIFIED REGISTERED

## 2025-05-27 PROCEDURE — 63600175 PHARM REV CODE 636 W HCPCS: Mod: PO | Performed by: ORTHOPAEDIC SURGERY

## 2025-05-27 PROCEDURE — 36000711: Mod: PO | Performed by: ORTHOPAEDIC SURGERY

## 2025-05-27 PROCEDURE — C1713 ANCHOR/SCREW BN/BN,TIS/BN: HCPCS | Mod: PO | Performed by: ORTHOPAEDIC SURGERY

## 2025-05-27 PROCEDURE — 25575 OPTX RDL&ULN SHFT FX RDS&ULN: CPT | Mod: RT,,, | Performed by: ORTHOPAEDIC SURGERY

## 2025-05-27 PROCEDURE — 71000033 HC RECOVERY, INTIAL HOUR: Mod: PO | Performed by: ORTHOPAEDIC SURGERY

## 2025-05-27 PROCEDURE — 71000015 HC POSTOP RECOV 1ST HR: Mod: PO | Performed by: ORTHOPAEDIC SURGERY

## 2025-05-27 PROCEDURE — 37000009 HC ANESTHESIA EA ADD 15 MINS: Mod: PO | Performed by: ORTHOPAEDIC SURGERY

## 2025-05-27 PROCEDURE — 27201423 OPTIME MED/SURG SUP & DEVICES STERILE SUPPLY: Mod: PO | Performed by: ORTHOPAEDIC SURGERY

## 2025-05-27 PROCEDURE — 63600175 PHARM REV CODE 636 W HCPCS: Mod: PO | Performed by: ANESTHESIOLOGY

## 2025-05-27 PROCEDURE — 63600175 PHARM REV CODE 636 W HCPCS: Mod: PO | Performed by: NURSE ANESTHETIST, CERTIFIED REGISTERED

## 2025-05-27 DEVICE — SCREW BONE LOCKING 2.4X24MM: Type: IMPLANTABLE DEVICE | Site: WRIST | Status: FUNCTIONAL

## 2025-05-27 DEVICE — IMPLANTABLE DEVICE: Type: IMPLANTABLE DEVICE | Site: WRIST | Status: FUNCTIONAL

## 2025-05-27 DEVICE — SCREW CORTEX 2.7 X 16MM: Type: IMPLANTABLE DEVICE | Site: WRIST | Status: FUNCTIONAL

## 2025-05-27 DEVICE — SCREW CORTEX 2.7X18MM: Type: IMPLANTABLE DEVICE | Site: WRIST | Status: FUNCTIONAL

## 2025-05-27 DEVICE — SCREW BONE DISTAL VA 2.4X26MM: Type: IMPLANTABLE DEVICE | Site: WRIST | Status: FUNCTIONAL

## 2025-05-27 DEVICE — SCREW LOCKING 2.4 X 18MM: Type: IMPLANTABLE DEVICE | Site: WRIST | Status: FUNCTIONAL

## 2025-05-27 DEVICE — SCREW BONE CORT 2.7X20MM SS: Type: IMPLANTABLE DEVICE | Site: WRIST | Status: FUNCTIONAL

## 2025-05-27 RX ORDER — CEFAZOLIN SODIUM 1 G/3ML
2 INJECTION, POWDER, FOR SOLUTION INTRAMUSCULAR; INTRAVENOUS
Status: DISCONTINUED | OUTPATIENT
Start: 2025-05-27 | End: 2025-05-27 | Stop reason: HOSPADM

## 2025-05-27 RX ORDER — ONDANSETRON HYDROCHLORIDE 2 MG/ML
4 INJECTION, SOLUTION INTRAVENOUS DAILY PRN
Status: DISCONTINUED | OUTPATIENT
Start: 2025-05-27 | End: 2025-05-27 | Stop reason: HOSPADM

## 2025-05-27 RX ORDER — BUPIVACAINE HYDROCHLORIDE 5 MG/ML
INJECTION, SOLUTION EPIDURAL; INTRACAUDAL; PERINEURAL
Status: DISCONTINUED | OUTPATIENT
Start: 2025-05-27 | End: 2025-05-27

## 2025-05-27 RX ORDER — OXYCODONE HYDROCHLORIDE 5 MG/1
5 TABLET ORAL
Status: DISCONTINUED | OUTPATIENT
Start: 2025-05-27 | End: 2025-05-27 | Stop reason: HOSPADM

## 2025-05-27 RX ORDER — HYDROMORPHONE HYDROCHLORIDE 2 MG/ML
0.2 INJECTION, SOLUTION INTRAMUSCULAR; INTRAVENOUS; SUBCUTANEOUS EVERY 5 MIN PRN
Status: DISCONTINUED | OUTPATIENT
Start: 2025-05-27 | End: 2025-05-27 | Stop reason: HOSPADM

## 2025-05-27 RX ORDER — FENTANYL CITRATE 50 UG/ML
25 INJECTION, SOLUTION INTRAMUSCULAR; INTRAVENOUS EVERY 5 MIN PRN
Status: DISCONTINUED | OUTPATIENT
Start: 2025-05-27 | End: 2025-05-27 | Stop reason: HOSPADM

## 2025-05-27 RX ORDER — LIDOCAINE HYDROCHLORIDE 10 MG/ML
1 INJECTION, SOLUTION EPIDURAL; INFILTRATION; INTRACAUDAL; PERINEURAL ONCE
Status: DISCONTINUED | OUTPATIENT
Start: 2025-05-27 | End: 2025-05-27 | Stop reason: HOSPADM

## 2025-05-27 RX ORDER — FENTANYL CITRATE 50 UG/ML
INJECTION, SOLUTION INTRAMUSCULAR; INTRAVENOUS
Status: DISCONTINUED | OUTPATIENT
Start: 2025-05-27 | End: 2025-05-27

## 2025-05-27 RX ORDER — OXYCODONE HYDROCHLORIDE 10 MG/1
10 TABLET ORAL EVERY 4 HOURS PRN
Qty: 12 TABLET | Refills: 0 | Status: SHIPPED | OUTPATIENT
Start: 2025-05-27

## 2025-05-27 RX ORDER — DIPHENHYDRAMINE HYDROCHLORIDE 50 MG/ML
12.5 INJECTION, SOLUTION INTRAMUSCULAR; INTRAVENOUS EVERY 6 HOURS PRN
Status: DISCONTINUED | OUTPATIENT
Start: 2025-05-27 | End: 2025-05-27 | Stop reason: HOSPADM

## 2025-05-27 RX ORDER — ROCURONIUM BROMIDE 10 MG/ML
INJECTION, SOLUTION INTRAVENOUS
Status: DISCONTINUED | OUTPATIENT
Start: 2025-05-27 | End: 2025-05-27

## 2025-05-27 RX ORDER — PROPOFOL 10 MG/ML
VIAL (ML) INTRAVENOUS
Status: DISCONTINUED | OUTPATIENT
Start: 2025-05-27 | End: 2025-05-27

## 2025-05-27 RX ORDER — ONDANSETRON HYDROCHLORIDE 2 MG/ML
INJECTION, SOLUTION INTRAVENOUS
Status: DISCONTINUED | OUTPATIENT
Start: 2025-05-27 | End: 2025-05-27

## 2025-05-27 RX ORDER — BUPIVACAINE 13.3 MG/ML
INJECTION, SUSPENSION, LIPOSOMAL INFILTRATION
Status: DISCONTINUED | OUTPATIENT
Start: 2025-05-27 | End: 2025-05-27

## 2025-05-27 RX ORDER — SODIUM CHLORIDE, SODIUM LACTATE, POTASSIUM CHLORIDE, CALCIUM CHLORIDE 600; 310; 30; 20 MG/100ML; MG/100ML; MG/100ML; MG/100ML
INJECTION, SOLUTION INTRAVENOUS CONTINUOUS
Status: DISCONTINUED | OUTPATIENT
Start: 2025-05-27 | End: 2025-05-27 | Stop reason: HOSPADM

## 2025-05-27 RX ORDER — MIDAZOLAM HYDROCHLORIDE 1 MG/ML
1-2 INJECTION, SOLUTION INTRAMUSCULAR; INTRAVENOUS ONCE
Status: COMPLETED | OUTPATIENT
Start: 2025-05-27 | End: 2025-05-27

## 2025-05-27 RX ORDER — ONDANSETRON 8 MG/1
8 TABLET, ORALLY DISINTEGRATING ORAL EVERY 8 HOURS PRN
Qty: 3 TABLET | Refills: 0 | Status: SHIPPED | OUTPATIENT
Start: 2025-05-27

## 2025-05-27 RX ORDER — PHENYLEPHRINE HYDROCHLORIDE 10 MG/ML
INJECTION INTRAVENOUS
Status: DISCONTINUED | OUTPATIENT
Start: 2025-05-27 | End: 2025-05-27

## 2025-05-27 RX ORDER — SUCCINYLCHOLINE CHLORIDE 20 MG/ML
INJECTION INTRAMUSCULAR; INTRAVENOUS
Status: DISCONTINUED | OUTPATIENT
Start: 2025-05-27 | End: 2025-05-27

## 2025-05-27 RX ORDER — FENTANYL CITRATE 50 UG/ML
25-200 INJECTION, SOLUTION INTRAMUSCULAR; INTRAVENOUS
Status: DISCONTINUED | OUTPATIENT
Start: 2025-05-27 | End: 2025-05-27 | Stop reason: HOSPADM

## 2025-05-27 RX ORDER — SODIUM CHLORIDE 0.9 % (FLUSH) 0.9 %
3 SYRINGE (ML) INJECTION
Status: DISCONTINUED | OUTPATIENT
Start: 2025-05-27 | End: 2025-05-27 | Stop reason: HOSPADM

## 2025-05-27 RX ORDER — MUPIROCIN 20 MG/G
OINTMENT TOPICAL
Status: DISCONTINUED | OUTPATIENT
Start: 2025-05-27 | End: 2025-05-27 | Stop reason: HOSPADM

## 2025-05-27 RX ORDER — LIDOCAINE HYDROCHLORIDE 20 MG/ML
INJECTION INTRAVENOUS
Status: DISCONTINUED | OUTPATIENT
Start: 2025-05-27 | End: 2025-05-27

## 2025-05-27 RX ORDER — GLUCAGON 1 MG
1 KIT INJECTION
Status: DISCONTINUED | OUTPATIENT
Start: 2025-05-27 | End: 2025-05-27 | Stop reason: HOSPADM

## 2025-05-27 RX ORDER — DEXAMETHASONE SODIUM PHOSPHATE 4 MG/ML
INJECTION, SOLUTION INTRA-ARTICULAR; INTRALESIONAL; INTRAMUSCULAR; INTRAVENOUS; SOFT TISSUE
Status: DISCONTINUED | OUTPATIENT
Start: 2025-05-27 | End: 2025-05-27

## 2025-05-27 RX ADMIN — SUGAMMADEX 200 MG: 100 INJECTION, SOLUTION INTRAVENOUS at 03:05

## 2025-05-27 RX ADMIN — MUPIROCIN: 20 OINTMENT TOPICAL at 12:05

## 2025-05-27 RX ADMIN — FENTANYL CITRATE 50 MCG: 50 INJECTION, SOLUTION INTRAMUSCULAR; INTRAVENOUS at 01:05

## 2025-05-27 RX ADMIN — DEXAMETHASONE SODIUM PHOSPHATE 4 MG: 4 INJECTION, SOLUTION INTRAMUSCULAR; INTRAVENOUS at 01:05

## 2025-05-27 RX ADMIN — LIDOCAINE HYDROCHLORIDE 100 MG: 20 INJECTION INTRAVENOUS at 01:05

## 2025-05-27 RX ADMIN — SODIUM CHLORIDE, SODIUM LACTATE, POTASSIUM CHLORIDE, AND CALCIUM CHLORIDE: .6; .31; .03; .02 INJECTION, SOLUTION INTRAVENOUS at 12:05

## 2025-05-27 RX ADMIN — SODIUM CHLORIDE, POTASSIUM CHLORIDE, SODIUM LACTATE AND CALCIUM CHLORIDE: 600; 310; 30; 20 INJECTION, SOLUTION INTRAVENOUS at 12:05

## 2025-05-27 RX ADMIN — FENTANYL CITRATE 100 MCG: 50 INJECTION INTRAMUSCULAR; INTRAVENOUS at 12:05

## 2025-05-27 RX ADMIN — MIDAZOLAM 1 MG: 1 INJECTION INTRAMUSCULAR; INTRAVENOUS at 12:05

## 2025-05-27 RX ADMIN — PROPOFOL 200 MG: 10 INJECTION, EMULSION INTRAVENOUS at 01:05

## 2025-05-27 RX ADMIN — PHENYLEPHRINE HYDROCHLORIDE 100 MCG: 10 INJECTION INTRAVENOUS at 01:05

## 2025-05-27 RX ADMIN — SODIUM CHLORIDE, SODIUM LACTATE, POTASSIUM CHLORIDE, AND CALCIUM CHLORIDE: .6; .31; .03; .02 INJECTION, SOLUTION INTRAVENOUS at 02:05

## 2025-05-27 RX ADMIN — SUCCINYLCHOLINE CHLORIDE 180 MG: 20 INJECTION, SOLUTION INTRAMUSCULAR; INTRAVENOUS at 01:05

## 2025-05-27 RX ADMIN — BUPIVACAINE HYDROCHLORIDE 10 ML: 5 INJECTION, SOLUTION EPIDURAL; INTRACAUDAL; PERINEURAL at 12:05

## 2025-05-27 RX ADMIN — BUPIVACAINE 10 ML: 13.3 INJECTION, SUSPENSION, LIPOSOMAL INFILTRATION at 12:05

## 2025-05-27 RX ADMIN — ROCURONIUM BROMIDE 10 MG: 10 INJECTION, SOLUTION INTRAVENOUS at 01:05

## 2025-05-27 RX ADMIN — ONDANSETRON 4 MG: 2 INJECTION, SOLUTION INTRAMUSCULAR; INTRAVENOUS at 01:05

## 2025-05-27 NOTE — TRANSFER OF CARE
"Anesthesia Transfer of Care Note    Patient: Bossman Duke    Procedure(s) Performed: Procedure(s) (LRB):  Right distal radius fracture open reduction internal fixation (Right)  Right distal ulna shaft fracture open reduction internal fixation (Right)    Patient location: PACU    Anesthesia Type: general    Transport from OR: Transported from OR on room air with adequate spontaneous ventilation    Post pain: adequate analgesia    Post assessment: no apparent anesthetic complications    Post vital signs: stable    Level of consciousness: awake and alert    Nausea/Vomiting: no nausea/vomiting    Complications: none    Transfer of care protocol was followed    Last vitals: Visit Vitals  BP (!) 124/56   Pulse 66   Temp 36.3 °C (97.3 °F) (Skin)   Resp 17   Ht 5' 10" (1.778 m)   Wt 135.2 kg (298 lb)   SpO2 98%   BMI 42.76 kg/m²     "

## 2025-05-27 NOTE — OP NOTE
Bossman Duke  1955    DATE OF SURGERY: 5/27/2025     PRE-OPERATIVE DIAGNOSIS:  Right distal radius fracture and right ulnar shaft fracture    POST-OPERATIVE DIAGNOSIS:  Right distal radius fracture and right ulnar shaft fracture     ANESTHESIA TYPE:  General with a single-shot supraclavicular block    BLOOD LOSS:  15-20 cc    TOURNIQUET TIME:  Approximately 80 minutes    SURGEON: Dr Leonard    ASSISTANT:  Debby Salcedo    PROCEDURE:   1.  Open reduction internal fixation right distal radius fracture   2.  Open reduction internal fixation right ulnar shaft fracture    IMPLANTS:  Synthes variable angle locking distal radius plate, Synthes 2.7 mm dynamic compression plate     SPECIMENS:  None    INDICATION:     Mr. Duke has a history of a injury to his right wrist.  He was noted have comminuted and displaced fracture of the distal radius.  There was also a fracture of the distal ulna which had comminution and displacement.  Due to the combination of fractures I discussed the possibility of open reduction and internal fixation.  Informed consent was then obtained    PROCEDURE IN DETAIL:     Mr. Duke was transported to the operating room and was placed supine on the operating room table. All appropriate points were padded. The right arm and hand was prepped and draped in the normal sterile fashion. Time out was called. The correct patient, correct operative site, correct procedure, antibiotic administration which consisted of 2 g of Ancef, and allergies to medications which are to Patient has no known allergies.  were reviewed. Time in was then called.     Attention was turned to the right wrist.  A volar approach to the distal radius was performed.  The incision was carried through the skin.  Subcutaneous tissues were dissected with tenotomy scissors.  The FCR tendon was identified.  The palmar and dorsal tendon sheath was incised.  The radial artery was identified and protected throughout the case.   Entry into the deeper space revealed the pronator quadratus.  Pronator quadratus was incised over its radial margin and elevated off of the distal radius.  There was noted to be a comminuted fracture which was posteriorly displaced.  The fracture site was then cleared of a significant amount of debris.  After several attempts at reduction using point-to-point reduction clamps a good reduction of the distal radius fracture was achieved.  Temporary fixation with K-wire was performed.  A Synthes large distal radius plate was selected.  It was positioned over the distal radius and the fracture.  Fluoroscopy was used to confirm fracture reduction and plate position.  A single screw was placed into the proximal oblong hole.  A nonlocking screw was then placed into the 2nd most ulnar hole of the distal portion of the plate.  This further reduced the fracture back to the plate.  An excellent reduction was achieved.  At this time an additional 4 distal locking screws were placed.  The nylon screw was then treated for a locking screw.  Two additional proximal nonlocking screws were placed.  This completed the construct for the radius.  There was noted to be very good alignment of the fracture with good positioning of the plate and screws.  The wound was then copiously irrigated.      Attention was then turned to the right ulna.  A 8 cm incision was made over the ulnar border of the forearm.  The incision was carried through the skin.  Subcutaneous tissues were dissected with tenotomy scissors.  The fascia overlying the ulna was incised.  Fascia was elevated off of the ulna with a key elevator.  The fracture site was visualized.  Reduction of the fracture was performed with point-to-point reduction clamps.  With a good reduction achieved a 2.7 mm dynamic compression plate was positioned over the fracture.  A slight bend had to be put into the very distal portion of the plate to accommodate the flare of the ulna.  At this time  it was noted to fit very well with good reduction of the fracture.  With the reduction held a proximal and distal screw were placed.  Fluoroscopy was used to confirm plate position and fracture reduction both were were acceptable.  At this time an additional cortical screw was placed in the distal most hole.  Two additional proximal screws were placed to complete the construct.  There was noted to be good alignment of the fracture with good positioning of the plate and screws under fluoroscopy.  At this time the ulna wounds then copiously irrigated.    The tourniquet was let down and hemostasis was obtained at both wound sites.  Both wounds were closed with a combination of 3-0 Vicryl subcutaneous sutures and 3-0 nylon superficial sutures.  The wounds were dressed with Xeroform, gauze padding, cast padding and a sugar-tong splint was placed.    The patient was awakened from anesthesia and was transported to the recovery room in stable condition. All lap, needle, sponge, and equipment counts were correct at the end of the case.    POST-OPERATIVE PLAN:     Patient will keep the splint in place for 2 weeks which time he will follow up with me.  I will go into a short-arm splint versus Velcro brace at that time to begin gentle range of motion

## 2025-05-27 NOTE — ANESTHESIA POSTPROCEDURE EVALUATION
Anesthesia Post Evaluation    Patient: Bossman Duke    Procedure(s) Performed: Procedure(s) (LRB):  Right distal radius fracture open reduction internal fixation (Right)  Right distal ulna shaft fracture open reduction internal fixation (Right)    Final Anesthesia Type: general      Patient location during evaluation: PACU  Patient participation: Yes- Able to Participate  Level of consciousness: sedated and awake  Post-procedure vital signs: reviewed and stable  Pain management: adequate  Airway patency: patent    PONV status at discharge: No PONV  Anesthetic complications: no      Cardiovascular status: blood pressure returned to baseline and hemodynamically stable  Respiratory status: spontaneous ventilation  Hydration status: euvolemic  Follow-up not needed.              Vitals Value Taken Time   /67 05/27/25 15:49   Temp  05/27/25 15:49   Pulse 66 05/27/25 15:49   Resp 9 05/27/25 15:49   SpO2 93% 05/27/25 15:49         No case tracking events are documented in the log.      Pain/Kenyetta Score: Pain Rating Prior to Med Admin: 0 (5/27/2025 12:48 PM)  Pain Rating Post Med Admin: 0 (5/27/2025 12:52 PM)  Kenyetta Score: 8 (5/27/2025 12:52 PM)

## 2025-05-27 NOTE — PROGRESS NOTES
Right supraclavicular single shot block complete per Dr. Gillespie with Anesthesia. Exparel band placed to pt's wrist. Pt tolerated well. See Anesthesia record for procedural notes. See flowsheet and MAR for vitals and medications. Monitors in place, alarms audible. VSS. etCO2 monitoring in place. Resp even, unlabored. Skin warm, dry. Pt in NAD. Pt awaiting transport to OR. Family at bedside. Bed in lowest, locked position. Side rails up x2. Call light within reach.

## 2025-05-27 NOTE — DISCHARGE INSTRUCTIONS
Procedure:  Open reduction and internal fixation right distal radius fracture    1.  Please keep the splint clean, dry, and in place. Do not take it off and do not get it wet.    2.  Please keep the sling to the right arm in place until you have regained full control over the arm and hand    3.  The pain block to the right arm will last between 1 and 3 days.  As soon as the pain block begins to wear off you can begin taking the prescribed pain medication    4.  Nausea medication has been prescribed in the case that you have any postoperative stomach upset    5.  Flexion and extension of the exposed fingers is encouraged but do NOT attempt to lift or push off with the right arm or hand.    6.  If there are any questions or concerns please call Dr. Leonard office at [phone]    7.  Follow up with Dr. Leonard in 2 weeks

## 2025-05-27 NOTE — ANESTHESIA PROCEDURE NOTES
Intubation    Date/Time: 5/27/2025 1:10 PM    Performed by: Brit Bowden CRNA  Authorized by: Dixon Gillespie MD    Intubation:     Induction:  Intravenous    Intubated:  Postinduction    Mask Ventilation:  Easy mask    Attempts:  1    Attempted By:  CRNA    Method of Intubation:  Video laryngoscopy and direct    Blade:  Evans 3    Laryngeal View Grade: Grade I - full view of cords      Difficult Airway Encountered?: No      Complications:  None    Airway Device:  Oral endotracheal tube    Airway Device Size:  8.0    Style/Cuff Inflation:  Cuffed (inflated to minimal occlusive pressure)    Tube secured:  23    Secured at:  The lips    Placement Verified By:  Capnometry    Complicating Factors:  None    Findings Post-Intubation:  BS equal bilateral and atraumatic/condition of teeth unchanged

## 2025-05-27 NOTE — ANESTHESIA PROCEDURE NOTES
Peripheral Block    Patient location during procedure: pre-op   Block not for primary anesthetic.  Reason for block: at surgeon's request and post-op pain management   Post-op Pain Location: right wrist   Start time: 5/27/2025 12:48 PM  Timeout: 5/27/2025 12:48 PM   End time: 5/27/2025 12:55 PM    Staffing  Authorizing Provider: Dixon Gillespie MD  Performing Provider: Dixon Gillespie MD    Staffing  Performed by: Dixon Gillespie MD  Authorized by: Dixon Gillespie MD    Preanesthetic Checklist  Completed: patient identified, IV checked, site marked, risks and benefits discussed, surgical consent, monitors and equipment checked, pre-op evaluation and timeout performed  Peripheral Block  Patient position: supine  Prep: ChloraPrep  Patient monitoring: heart rate, cardiac monitor, continuous pulse ox, continuous capnometry and frequent blood pressure checks  Block type: supraclavicular  Laterality: right  Injection technique: single shot  Needle  Needle type: Stimuplex   Needle gauge: 22 G  Needle length: 4 in  Needle localization: anatomical landmarks and ultrasound guidance   -ultrasound image captured on disc.  Assessment  Injection assessment: negative aspiration, negative parasthesia and local visualized surrounding nerve  Paresthesia pain: none  Heart rate change: no  Slow fractionated injection: yes  Pain Tolerance: comfortable throughout block and no complaints      Additional Notes  VSS.  [facility] RN monitoring vitals throughout procedure.  Patient tolerated procedure well.

## 2025-05-27 NOTE — ANESTHESIA PREPROCEDURE EVALUATION
05/27/2025  Bossman Duke is a 69 y.o., male.      Pre-op Assessment    I have reviewed the Patient Summary Reports.     I have reviewed the Nursing Notes. I have reviewed the NPO Status.   I have reviewed the Medications.     Review of Systems  Anesthesia Hx:  No problems with previous Anesthesia             Denies Family Hx of Anesthesia complications.    Denies Personal Hx of Anesthesia complications.                    Social:  Former Smoker '24      Cardiovascular:     Hypertension   CAD   CABG/stent (CABG, JT Ligation May '24) Dysrhythmias atrial fibrillation  CHF   PVD hyperlipidemia   ECG has been reviewed. NSR today  Normal EF    C '24  Coronary angiography demonstrates moderately severe LAD and circumflex disease.  There is mild stenosis of a diminutive right coronary.    Atrial ablation 1/2025                           Pulmonary:   COPD     Sleep Apnea, CPAP                Hepatic/GI:   PUD,                  Musculoskeletal:     Right wrist            Endocrine:  Diabetes, type 2         Morbid Obesity / BMI > 40      Physical Exam  General: Cooperative, Alert and Oriented    Airway:  Mallampati: III   Mouth Opening: Normal  TM Distance: Normal  Tongue: Normal  Neck ROM: Normal ROM    Dental:  Loose teeth    Chest/Lungs:  Clear to auscultation, Normal Respiratory Rate    Heart:  Rate: Normal  Rhythm: Regular Rhythm  Sounds: Normal        Anesthesia Plan  Type of Anesthesia, risks & benefits discussed:    Anesthesia Type: Gen Supraglottic Airway, Gen ETT  Intra-op Monitoring Plan: Standard ASA Monitors  Post Op Pain Control Plan: multimodal analgesia, IV/PO Opioids PRN and peripheral nerve block  Induction:  IV and Inhalation  Airway Plan: Video, Post-Induction  Informed Consent: Informed consent signed with the Patient and all parties understand the risks and agree with anesthesia plan.  All  questions answered.   ASA Score: 3  Day of Surgery Review of History & Physical: H&P Update referred to the surgeon/provider.    Ready For Surgery From Anesthesia Perspective.     .

## 2025-05-28 VITALS
WEIGHT: 298 LBS | RESPIRATION RATE: 17 BRPM | OXYGEN SATURATION: 98 % | SYSTOLIC BLOOD PRESSURE: 144 MMHG | BODY MASS INDEX: 42.66 KG/M2 | TEMPERATURE: 97 F | HEIGHT: 70 IN | DIASTOLIC BLOOD PRESSURE: 72 MMHG | HEART RATE: 67 BPM

## 2025-05-30 NOTE — DISCHARGE SUMMARY
[facility] - Surgery  Discharge Note  Short Stay    Procedure(s) (LRB):  Right distal radius fracture open reduction internal fixation (Right)  Right distal ulna shaft fracture open reduction internal fixation (Right)      OUTCOME: Patient tolerated treatment/procedure well without complication and is now ready for discharge.    DISPOSITION: Home or Self Care    FINAL DIAGNOSIS:  Closed fracture of right distal radius    FOLLOWUP: In clinic    DISCHARGE INSTRUCTIONS:    Discharge Procedure Orders   SLING ORTHOPEDIC LARGE FOR HOME USE     Diet general     Activity as tolerated     Keep surgical extremity elevated     Lifting restrictions   Order Comments: Please do not lift or push off with the right arm or hand     Leave dressing on - Keep it clean, dry, and intact until clinic visit     Call MD for:  temperature >100.4     Call MD for:  persistent nausea and vomiting     Call MD for:  severe uncontrolled pain     Call MD for:  difficulty breathing, headache or visual disturbances     Call MD for:  redness, tenderness, or signs of infection (pain, swelling, redness, odor or green/yellow discharge around incision site)     Call MD for:  hives     Call MD for:  persistent dizziness or light-headedness     Call MD for:  extreme fatigue        TIME SPENT ON DISCHARGE: 15 minutes

## 2025-06-03 DIAGNOSIS — S52.571A OTHER CLOSED INTRA-ARTICULAR FRACTURE OF DISTAL END OF RIGHT RADIUS, INITIAL ENCOUNTER: Primary | ICD-10-CM

## 2025-06-11 ENCOUNTER — OFFICE VISIT (OUTPATIENT)
Dept: ORTHOPEDICS | Facility: CLINIC | Age: 70
End: 2025-06-11
Payer: MEDICARE

## 2025-06-11 ENCOUNTER — HOSPITAL ENCOUNTER (OUTPATIENT)
Dept: RADIOLOGY | Facility: HOSPITAL | Age: 70
Discharge: HOME | End: 2025-06-11
Attending: ORTHOPAEDIC SURGERY
Payer: MEDICARE

## 2025-06-11 DIAGNOSIS — S52.571A OTHER CLOSED INTRA-ARTICULAR FRACTURE OF DISTAL END OF RIGHT RADIUS, INITIAL ENCOUNTER: ICD-10-CM

## 2025-06-11 DIAGNOSIS — S52.571A OTHER CLOSED INTRA-ARTICULAR FRACTURE OF DISTAL END OF RIGHT RADIUS, INITIAL ENCOUNTER: Primary | ICD-10-CM

## 2025-06-11 DIAGNOSIS — S52.691A OTHER CLOSED FRACTURE OF DISTAL END OF RIGHT ULNA, INITIAL ENCOUNTER: ICD-10-CM

## 2025-06-11 PROCEDURE — 99024 POSTOP FOLLOW-UP VISIT: CPT | Mod: S$GLB,,, | Performed by: ORTHOPAEDIC SURGERY

## 2025-06-11 PROCEDURE — 3044F HG A1C LEVEL LT 7.0%: CPT | Mod: CPTII,S$GLB,, | Performed by: ORTHOPAEDIC SURGERY

## 2025-06-11 PROCEDURE — 99999 PR PBB SHADOW E&M-EST. PATIENT-LVL III: CPT | Mod: PBBFAC,,, | Performed by: ORTHOPAEDIC SURGERY

## 2025-06-11 PROCEDURE — 29075 APPL CST ELBW FNGR SHORT ARM: CPT | Mod: 58,RT,S$GLB, | Performed by: ORTHOPAEDIC SURGERY

## 2025-06-11 PROCEDURE — 1125F AMNT PAIN NOTED PAIN PRSNT: CPT | Mod: CPTII,S$GLB,, | Performed by: ORTHOPAEDIC SURGERY

## 2025-06-11 PROCEDURE — 73110 X-RAY EXAM OF WRIST: CPT | Mod: 26,RT,, | Performed by: RADIOLOGY

## 2025-06-11 PROCEDURE — 73110 X-RAY EXAM OF WRIST: CPT | Mod: TC,PO,RT

## 2025-06-11 NOTE — PROGRESS NOTES
Mr Duke returns to clinic today.  Has a history of right distal radius and ulnar shaft fractures.  He is 2 weeks status post open reduction internal fixation.  He is overall doing well     Physical exam: Examination of the right forearm wrist and hand reveals that the wounds are healing well.  There is moderate edema.  There are sutures in place.  There was no erythema or drainage.  He is neurovascularly intact.  He does have difficulty with flexing.  He is missing 3-4 cm of composite flexion     Radiology: X-rays of the right wrist were taken in clinic today.  He is noted have fractures of the distal radius and the ulnar shaft.  These both remain well-aligned and a stabilized with plate and screw fixation     Assessment: Right distal radius and ulnar shaft fractures     Plan:    1. Sutures were removed today and Steri-Strips are placed     2.  It was placed into a fiberglass short-arm cast     3.  He will begin to work more aggressively on flexing and extending the fingers     4.  He will follow up with me in 2 weeks with repeat x-ray of the right wrist at which point we will discuss the timing of going to a Velcro brace

## 2025-06-16 ENCOUNTER — OFFICE VISIT (OUTPATIENT)
Dept: CARDIOLOGY | Facility: CLINIC | Age: 70
End: 2025-06-16
Payer: MEDICARE

## 2025-06-16 VITALS
BODY MASS INDEX: 41.66 KG/M2 | WEIGHT: 291 LBS | DIASTOLIC BLOOD PRESSURE: 88 MMHG | HEIGHT: 70 IN | SYSTOLIC BLOOD PRESSURE: 143 MMHG | HEART RATE: 90 BPM

## 2025-06-16 DIAGNOSIS — I10 PRIMARY HYPERTENSION: ICD-10-CM

## 2025-06-16 DIAGNOSIS — I50.32 CHRONIC DIASTOLIC CONGESTIVE HEART FAILURE: ICD-10-CM

## 2025-06-16 DIAGNOSIS — E66.01 MORBID (SEVERE) OBESITY DUE TO EXCESS CALORIES: ICD-10-CM

## 2025-06-16 DIAGNOSIS — I48.19 PERSISTENT ATRIAL FIBRILLATION: Primary | ICD-10-CM

## 2025-06-16 DIAGNOSIS — I25.10 CORONARY ARTERY DISEASE, UNSPECIFIED VESSEL OR LESION TYPE, UNSPECIFIED WHETHER ANGINA PRESENT, UNSPECIFIED WHETHER NATIVE OR TRANSPLANTED HEART: ICD-10-CM

## 2025-06-16 DIAGNOSIS — Z95.1 HX OF CABG: ICD-10-CM

## 2025-06-16 DIAGNOSIS — I48.4 ATYPICAL ATRIAL FLUTTER: ICD-10-CM

## 2025-06-16 DIAGNOSIS — G47.33 OSA (OBSTRUCTIVE SLEEP APNEA): ICD-10-CM

## 2025-06-16 LAB
OHS QRS DURATION: 96 MS
OHS QTC CALCULATION: 471 MS

## 2025-06-16 PROCEDURE — 93010 ELECTROCARDIOGRAM REPORT: CPT | Mod: S$GLB,,, | Performed by: INTERNAL MEDICINE

## 2025-06-16 PROCEDURE — 93005 ELECTROCARDIOGRAM TRACING: CPT | Mod: PO

## 2025-06-16 PROCEDURE — 1160F RVW MEDS BY RX/DR IN RCRD: CPT | Mod: CPTII,S$GLB,, | Performed by: INTERNAL MEDICINE

## 2025-06-16 PROCEDURE — 1126F AMNT PAIN NOTED NONE PRSNT: CPT | Mod: CPTII,S$GLB,, | Performed by: INTERNAL MEDICINE

## 2025-06-16 PROCEDURE — 3079F DIAST BP 80-89 MM HG: CPT | Mod: CPTII,S$GLB,, | Performed by: INTERNAL MEDICINE

## 2025-06-16 PROCEDURE — 99999 PR PBB SHADOW E&M-EST. PATIENT-LVL IV: CPT | Mod: PBBFAC,,, | Performed by: INTERNAL MEDICINE

## 2025-06-16 PROCEDURE — 3044F HG A1C LEVEL LT 7.0%: CPT | Mod: CPTII,S$GLB,, | Performed by: INTERNAL MEDICINE

## 2025-06-16 PROCEDURE — 99215 OFFICE O/P EST HI 40 MIN: CPT | Mod: 25,S$GLB,, | Performed by: INTERNAL MEDICINE

## 2025-06-16 PROCEDURE — 1159F MED LIST DOCD IN RCRD: CPT | Mod: CPTII,S$GLB,, | Performed by: INTERNAL MEDICINE

## 2025-06-16 PROCEDURE — 3288F FALL RISK ASSESSMENT DOCD: CPT | Mod: CPTII,S$GLB,, | Performed by: INTERNAL MEDICINE

## 2025-06-16 PROCEDURE — 1100F PTFALLS ASSESS-DOCD GE2>/YR: CPT | Mod: CPTII,S$GLB,, | Performed by: INTERNAL MEDICINE

## 2025-06-16 PROCEDURE — 3008F BODY MASS INDEX DOCD: CPT | Mod: CPTII,S$GLB,, | Performed by: INTERNAL MEDICINE

## 2025-06-16 PROCEDURE — 3077F SYST BP >= 140 MM HG: CPT | Mod: CPTII,S$GLB,, | Performed by: INTERNAL MEDICINE

## 2025-06-16 RX ORDER — MUPIROCIN 20 MG/G
OINTMENT TOPICAL
OUTPATIENT
Start: 2025-06-16

## 2025-06-16 RX ORDER — SODIUM CHLORIDE 9 MG/ML
INJECTION, SOLUTION INTRAVENOUS CONTINUOUS
OUTPATIENT
Start: 2025-06-16

## 2025-06-16 NOTE — PROGRESS NOTES
"    SUBJECTIVE:    Patient ID:  Bossman Duke is a 69 y.o. male who presents to the electrophysiology clinic for follow-up regarding AF.    Focused EP-relevant history:  Persistent atrial fibrillation s/p redo RF PVI with CTI ablation and posterior wall isolation 1/7/25  Atypical AFL vs AT  CAD s/p CABG with MAZE + JT ligation (5/20/24)  BIB on CPAP  PAD s/p PTA mid SFA 2021  HTN   COPD  Morbid obesity  Tobacco use     Cardiologist: Dr. Jon Neil     Patient was referred by Dr. Migdalia Roberts for AF management.  Patient was initially diagnosed with AF around 3/22 after being hospitalized with a pneumonia.  He was started on Xarelto and Cardizem at a time.  He had issues with bleeding from his gums, and oral anticoagulation was discontinued.     Recurrent AF with RVR 9/23.  He was discharged in rate controlled AF on Eliquis.  Eventually, he D/C Eliquis due to GI and gum bleeding.     He was having LAWSON prompting LHC and eventual CABG x3 + MAZE (isolation of all 4 pulmonary veins) procedure and left atrial appendage ligation (AtriClip).  Op report states that his PVs were "enormous".     Postoperatively, he had transient AF and started on amiodarone.  He was seen in clinic 08/29/2024 with recurrent AF with RVR > amio resumed. Anticoagulation was discussed but he preferred dual antiplatelet therapy     All ECGs in EMR reviewed.  With 1st ECG 9/23 showing atrial fibrillation with RVR.  Most recent ECG shows likely atypical flutter versus AT.     TTE 5/24:  Normal biventricular function.     12/10/2024  Reports LAWSON  Significant symptoms of SOB and palpitations with AF/AFL  >amiodarone d/c'd 12/24 02/26/2025  S/p redo PVI (re-isolation of LSPV), posterior wall isolation, and CTI ablation 1/7/25  No AF recurrence    06/16/2025  Fall approximately 3 months ago in the parking lot with a recurrent fall few weeks prior.  He underwent open reduction with internal fixation of the right distal radius and ulnar with Dr." Dudoussat.     Doing well from a rhythm standpoint with no known recurrence.  Tolerating oral anticoagulation.  Currently off of aspirin while on Xarelto.    The ECG with rhythm strip performed today in clinic was personally reviewed; my interpretation is sinus rhythm with PACs.  Heart rate 86 beats per minute.  QRS 96 milliseconds.   milliseconds.    Past Medical History:   Diagnosis Date    Anticoagulant long-term use     Arthritis     COPD (chronic obstructive pulmonary disease)     Coronary artery disease     Diabetes mellitus     Hypertension     PAD (peripheral artery disease)     PUD (peptic ulcer disease)     Sleep apnea     CPAP    Vertigo        Past Surgical History:   Procedure Laterality Date    ABLATION AND RECONSTRUCTION, CARDIAC ATRIUM, WITH LIMITED TISSUE ABLATION N/A 5/20/2024    Procedure: ABLATION AND RECONSTRUCTION, CARDIAC ATRIUM, WITH LIMITED TISSUE ABLATION;  Surgeon: Migdalia Roberts MD;  Location: [facility] OR;  Service: Cardiothoracic;  Laterality: N/A;    ABLATION, ATRIAL FIBRILLATION, CRYO  1/7/2025    Procedure: Ablation A-Fib Re-Do RFA;  Surgeon: Pritesh Puckett MD;  Location: [facility] CATH;  Service: Cardiology;;    ANGIOGRAM, CORONARY, WITH LEFT HEART CATHETERIZATION  4/24/2024    Procedure: Left Heart Cath;  Surgeon: Jl Neil MD;  Location: [facility] CATH;  Service: Cardiology;;    APPENDECTOMY      CERVICAL SPINE SURGERY      CORONARY ARTERY BYPASS GRAFT (CABG) N/A 5/20/2024    Procedure: CORONARY ARTERY BYPASS GRAFT (CABG) x3;  Surgeon: Migdalia Roberts MD;  Location: [facility] OR;  Service: Cardiothoracic;  Laterality: N/A;    ECHOCARDIOGRAM,TRANSESOPHAGEAL N/A 5/20/2024    Procedure: Transesophageal echo (JOHN) intra-procedure log documentation;  Surgeon: Migdalia Roberts MD;  Location: [facility] OR;  Service: Cardiothoracic;  Laterality: N/A;    ECHOCARDIOGRAM,TRANSESOPHAGEAL  1/7/2025    Procedure: (JOHN) intra-procedure;  Surgeon: Pritesh Puckett MD;  Location: [facility] CATH;  Service:  "Cardiology;;    ENDOSCOPIC HARVEST OF VEIN Right 5/20/2024    Procedure: HARVEST-VEIN-ENDOVASCULAR;  Surgeon: Migdalia Roberts MD;  Location: [facility] OR;  Service: Cardiothoracic;  Laterality: Right;    EPIDURAL STEROID INJECTION INTO LUMBAR SPINE N/A 10/5/2022    Procedure: Injection-steroid-epidural-lumbar L3/4;  Surgeon: Linwood Aponte MD;  Location: [facility] OR;  Service: Pain Management;  Laterality: N/A;    EXCLUSION, LEFT ATRIAL APPENDAGE, OPEN, AS PART OF OPEN CHEST SURGERY Left 5/20/2024    Procedure: EXCLUSION, LEFT ATRIAL APPENDAGE, OPEN, AS PART OF OPEN CHEST SURGERY;  Surgeon: Migdalia Roberts MD;  Location: [facility] OR;  Service: Cardiothoracic;  Laterality: Left;    EYE SURGERY Left     "as  a child"    INJECTION OF ANESTHETIC AGENT AROUND MEDIAL BRANCH NERVES INNERVATING LUMBAR FACET JOINT Bilateral 2/9/2021    Procedure: Block-nerve-medial branch-lumbar L3/4, L4/5, L5/S1;  Surgeon: Linwood Aponte MD;  Location: [facility] OR;  Service: Pain Management;  Laterality: Bilateral;    LUMBAR DISC SURGERY      OPEN REDUCTION AND INTERNAL FIXATION (ORIF) OF FRACTURE OF DISTAL RADIUS Right 5/27/2025    Procedure: Right distal radius fracture open reduction internal fixation;  Surgeon: Mark Leonard MD;  Location: [facility] OR;  Service: Orthopedics;  Laterality: Right;  Anesthesia: General with a single-shot supraclavicular Exparel block    OPEN REDUCTION AND INTERNAL FIXATION (ORIF) OF FRACTURE OF ULNA Right 5/27/2025    Procedure: Right distal ulna shaft fracture open reduction internal fixation;  Surgeon: Mark Leonard MD;  Location: [facility] OR;  Service: Orthopedics;  Laterality: Right;  Anesthesia: General with a single-shot supraclavicular Exparel block    PERCUTANEOUS TRANSLUMINAL ANGIOPLASTY N/A 2/15/2021    Procedure: Pta (Angioplasty, Percutaneous, Transluminal);  Surgeon: Jl Neil MD;  Location: [facility] CATH;  Service: Cardiology;  Laterality: N/A;    RADIOFREQUENCY ABLATION Right 3/1/2021    " Procedure: Radiofrequency Ablation L3/4, L4/5;  Surgeon: Linwood Aponte MD;  Location: [facility] OR;  Service: Pain Management;  Laterality: Right;    RADIOFREQUENCY ABLATION Left 3/10/2021    Procedure: Radiofrequency Ablation L3/4, L4/5;  Surgeon: Linwood Aponte MD;  Location: [facility] OR;  Service: Pain Management;  Laterality: Left;    RADIOFREQUENCY ABLATION OF LUMBAR MEDIAL BRANCH NERVE AT SINGLE LEVEL Bilateral 2022    Procedure: Radiofrequency Ablation, Nerve, Spinal, Lumbar, Medial Branch, L3/4, L4/5;  Surgeon: Linwood Aponte MD;  Location: [facility] OR;  Service: Pain Management;  Laterality: Bilateral;       Family History   Problem Relation Name Age of Onset    Diabetes Mother      Heart disease Mother      Heart disease Father      Glaucoma Neg Hx      Macular degeneration Neg Hx      Retinal detachment Neg Hx         Social History     Socioeconomic History    Marital status:    Tobacco Use    Smoking status: Former     Current packs/day: 0.00     Average packs/day: 2.0 packs/day for 56.0 years (112.0 ttl pk-yrs)     Types: Cigarettes     Start date:      Quit date:      Years since quittin.4    Smokeless tobacco: Never   Substance and Sexual Activity    Alcohol use: Yes     Alcohol/week: 2.0 standard drinks of alcohol     Types: 2 Cans of beer per week     Comment: every other week    Drug use: Never     Social Drivers of Health     Food Insecurity: No Food Insecurity (2024)    Hunger Vital Sign     Worried About Running Out of Food in the Last Year: Never true     Ran Out of Food in the Last Year: Never true   Transportation Needs: No Transportation Needs (2024)    TRANSPORTATION NEEDS     Transportation : No   Housing Stability: Unknown (2024)    Housing Stability Vital Sign     Unable to Pay for Housing in the Last Year: No     Homeless in the Last Year: No       Review of patient's allergies indicates:  No Known Allergies    Review of Systems   All other systems  "reviewed and are negative.         OBJECTIVE:     Vitals:    06/16/25 0940   BP: (!) 143/88   BP Location: Left arm   Patient Position: Sitting   Pulse: 90   Weight: 132 kg (291 lb 0.1 oz)   Height: 5' 10" (1.778 m)       BP Readings from Last 5 Encounters:   06/16/25 (!) 143/88   05/27/25 (!) 144/72   05/20/25 (!) 149/78   04/11/25 126/78   03/11/25 133/73        Physical Exam  Vitals reviewed.   Constitutional:       General: He is not in acute distress.     Appearance: He is obese. He is not ill-appearing.   HENT:      Head: Normocephalic and atraumatic.   Eyes:      Extraocular Movements: Extraocular movements intact.      Conjunctiva/sclera: Conjunctivae normal.   Cardiovascular:      Rate and Rhythm: Normal rate and regular rhythm.   Pulmonary:      Effort: Pulmonary effort is normal. No respiratory distress.   Musculoskeletal:         General: No swelling or deformity.   Skin:     Findings: No erythema or rash.   Neurological:      Mental Status: He is alert.             Current Outpatient Medications   Medication Instructions    aspirin (ECOTRIN) 81 mg, Daily    atorvastatin (LIPITOR) 20 mg, Oral, Daily    baclofen (LIORESAL) 5 mg, Oral, 2 times daily PRN    budesonide (PULMICORT) 0.5 mg, Nebulization, 2 times daily, Controller    diclofenac sodium (VOLTAREN) 2 g, Topical (Top), 4 times daily    furosemide (LASIX) 40 mg, Oral, 2 times daily    levalbuterol (XOPENEX) 1.25 mg, Nebulization, Every 6 hours PRN, Rescue    lubiprostone (AMITIZA) 8 mcg, Oral, 2 times daily with meals    metoprolol succinate (TOPROL-XL) 50 mg, Oral, Daily    ondansetron (ZOFRAN-ODT) 8 mg, Oral, Every 8 hours PRN    oxyCODONE (ROXICODONE) 10 mg, Oral, Every 4 hours PRN    potassium chloride (KLOR-CON) 10 MEQ TbSR 10 mEq, Oral, 2 times daily    tiotropium bromide (SPIRIVA RESPIMAT) 2.5 mcg/actuation inhaler 2 puffs, Inhalation, Daily, Controller    traZODone (DESYREL) 50 mg, Oral, Nightly    XARELTO 20 mg, Oral, With dinner "       Lipid Panel:   Lab Results   Component Value Date    CHOL 137 07/11/2024    HDL 46 07/11/2024    LDLCALC 69.4 07/11/2024    TRIG 108 07/11/2024    CHOLHDL 33.6 07/11/2024       The 10-year ASCVD risk score (Moisés LAUGHLIN, et al., 2019) is: 34.9%    Values used to calculate the score:      Age: 69 years      Sex: Male      Is Non- : No      Diabetic: Yes      Tobacco smoker: No      Systolic Blood Pressure: 143 mmHg      Is BP treated: Yes      HDL Cholesterol: 46 mg/dL      Total Cholesterol: 137 mg/dL    Most Recent EKG Results  Results for orders placed or performed in visit on 02/27/25   IN OFFICE EKG 12-LEAD (to [place])    Collection Time: 02/27/25  9:04 AM   Result Value Ref Range    QRS Duration 98 ms    [facility] QTC Calculation 453 ms    Narrative    Test Reason : I48.19,    Vent. Rate :  84 BPM     Atrial Rate :  84 BPM     P-R Int : 204 ms          QRS Dur :  98 ms      QT Int : 384 ms       P-R-T Axes : 104  57  71 degrees    QTcB Int : 453 ms    Sinus rhythm with Premature atrial complexes  Otherwise normal ECG  No previous ECGs available  Confirmed by Pritesh Puckett ([id number]),  Elis Watkins ([id number]) on  3/10/2025 3:11:35 PM    Referred By:            Confirmed By: Pritesh Puckett       Most Recent Echocardiogram Results  Results for orders placed during the hospital encounter of 01/02/25    Echo    Interpretation Summary    Very limited study given poor acoustic windows    LV and RV funciton appear groslly normal    IVC/SVC: Normal venous pressure at 3 mmHg.    No significant valular disease but limited sensitivity    No obvisous effusion      Most Recent Nuclear Stress Test Results  Results for orders placed during the hospital encounter of 12/09/20    Nuclear Stress - Cardiology Interpreted    Interpretation Summary    Normal myocardial perfusion scan. There is no evidence of myocardial ischemia or infarction.    There is a  mild intensity fixed defect in the inferior wall of the  left ventricle secondary to diaphragm attenuation.    Gated perfusion images showed an ejection fraction of 60%    The EKG portion of this study is negative for ischemia.    Arrhythmias during stress: PVCs.    There are no prior studies for comparison.      Most Recent Cardiac PET Stress Test Results  No results found for this or any previous visit.      Most Recent Cardiovascular Angiogram results  Results for orders placed during the hospital encounter of 01/07/25    Intra-Procedure Documentation    Narrative  JOHN performed in the Invasive Lab  - See Procedure Log link below for nursing documentation  - See JOHN order on Card Proc Tab for physician findings      Other Most Recent Cardiology Results  Results for orders placed during the hospital encounter of 01/07/25    Cardiac monitoring strips        All pertinent data including labs, imaging, EKGs, and studies listed above were reviewed.  All of the patients ECG tracings since most recent visit were personally interpreted by this provider    ASSESSMENT:   Bossman Duke is a 69 y.o. male who presents for follow of atrial fibrillation and flutter s/p redo PVI with CTI ablation and posterior wall isolation 1/25.  Doing well from a rhythm standpoint.  Currently on oral anticoagulation.  We will need to obtain a JOHN to ensure the left atrial appendage is completely ligated prior to recommending discontinuation of oral anticoagulation long-term.    The risks, benefits and alternatives of transesophageal echocardiogram (JOHN) were explained to the patient, patient's family and/or surrogate decision maker. No absolute contraindications of esophageal stricture, tumor, perforation, laceration,or diverticulum and/or active GI bleed. The risks include (but are not limited to) trauma to oral structures, esophageal trauma/perforation, bleeding, laryngospasm/brochospasm, infection, aspiration, sore throat/hoarseness, CVA, MI, and death. The patient wishes to proceed. All  questions and concerns addressed.      1. Persistent atrial fibrillation  IN OFFICE EKG 12-LEAD (to [place])    Case Request Endoscopy: Transesophageal echo (JOHN) intra-procedure log documentation      2. Atypical atrial flutter  IN OFFICE EKG 12-LEAD (to Muse)    Case Request Endoscopy: Transesophageal echo (JOHN) intra-procedure log documentation      3. Primary hypertension  IN OFFICE EKG 12-LEAD (to [place])      4. Coronary artery disease, unspecified vessel or lesion type, unspecified whether angina present, unspecified whether native or transplanted heart  IN OFFICE EKG 12-LEAD (to Muse)      5. Hx of CABG        6. Chronic diastolic congestive heart failure  Case Request Endoscopy: Transesophageal echo (JOHN) intra-procedure log documentation      7. Morbid (severe) obesity due to excess calories        8. BIB (obstructive sleep apnea)            PLAN FOR TREATMENT OF ABOVE DIAGNOSES:     Persistent atrial fibrillation and atypical AFL: currently in sinus  Strategy: rhythm control  Symptomatic: yes  Echocardiogram: LVEF normal  CVA prophylaxis: Xarelto 20 mg QHS (CHADS-VASc 3). JT ligation needs to be confirmed by JOHN.  Beta blockers/calcium channel blockers: Toprol 50 QD  Antiarrhythmics: None  Previous ablation?: Yes, MAZE 5/24 & s/p redo RF PVI with CTI ablation and posterior wall isolation 1/7/25    JOHN to ensure JT completely ligated prior to indefinite discontinuation of OAC. Continue Xarelto until then. Hold Xarelto 3 days prior to JOHN with plans to discontinue longterm pending JT findings. Will continue ASA indefinitely.  Continue Toprol XL 50 mg QD  Weight loss     Discussed treatment strategies including rate vs. rhythm control, specifically AAD therapy and/or ablation strategies (i.e., PVI)  3 pillars of AF management (SOS) addressed (Stroke, Optimize risk factors, and Symptom management)  Risk factors and behavior changes assessed/addressed, including CHF, exercise, HTN including sodium restriction,  DM, tobacco, maintaining a health BMI, EtOH, and BIB, were applicable  Reviewed most recent TSH, electrolytes, and echocardiogram and ordered if absent or update needed  Recommend >/= 210 min/wk of moderate-to-vigorous exercise which has been shown to reduce AF symptoms, decrease arrhythmia burden, enhance maintenance of sinus rhythm, and improve overall quality of life (2023 ACC/AHA/ACCP/HRS guidelines)    HTN: above goal  Continue diuretic and beta blocker  Avoid NSAID use  Recommend a healthy diet (DASH, low sodium diet) and exercise (at least 150 min/wk)  Discussed importance of maintaining a health BMI (18.5-24.9 kg/m2)    Obesity Class III (severe obesity) [BMI >/=40]: weight is stable   Calorie-controlled, nutrient-dense diet (e.g., Mediterranean or DASH-based) tailored to patients preferences  Recommend daily physical activity, aiming for >=150 minutes/week of moderate-intensity exercise; increase to >=300 min/week for weight loss. Set realistic weight loss goal of 5-10% body weight over 6 months  Management of associated comorbidities: HTN, HLD, DM2, BIB    BIB: Is currently treated with CPAP  Discussed importance of strict treatment of BIB and adverse outcomes a/w untreated or inadequately treated BIB  Addressed maintaining a healthy BMI (18.5-24.9 kg/m2)  Follow up with sleep medicine    CAD s/p CABG: stable. No angina.  Continue oral anticoagulation.  We will need aspirin once off of anticoagulation long-term.  Continue statin & AVN blocking agent(s)  BP control, exercise, healthy BMI, no tobacco use      F/u in 6 months     Chris Puckett MD  [facility]    This note was partially generated using voice recognition and generative artificial intelligence software. While every effort has been made to ensure its accuracy, transcription errors may exist. Please reach out to me with any questions or if clarification is needed.

## 2025-06-16 NOTE — PATIENT INSTRUCTIONS
[facility]    Arrive for your procedure at:  [facility], check in at building number 2, 1st floor ago July 24th at 12:30pm (Approximate arrival for 11am)      FASTING:  You MAY NOT have anything to eat AFTER MIDNIGHT. YOU MAY continue with CLEAR LIQUIDS ONLY (water, clear juice, sprite/7up, Gatorade)  up to 2 hours before your procedure.  If your procedure is scheduled in the afternoon, you may have a LIGHT BREAKFAST BEFORE 6:00 A.M.  For example: Two slices of toast; black coffee or black tea.    MEDICATIONS:  You may take your regular morning medications with a small sip of water.      Hold or adjust the following:                -Fluid pills. Hold morning of procedure Lasix    -Medications such as Wegovy, Ozempic, Mounjaro (hold 1 week prior if taking for weight loss and 1 day prior if taking daily for diabetes management)  Xarelto, Aspirin and other anti-coagulants/blood thinners Hold 2 days prior.     Please refer to pre-op instructions received from the surgery center. YOU WILL NEED SOMEONE TO DRIVE YOU HOME.

## 2025-06-20 DIAGNOSIS — S52.571A OTHER CLOSED INTRA-ARTICULAR FRACTURE OF DISTAL END OF RIGHT RADIUS, INITIAL ENCOUNTER: Primary | ICD-10-CM

## 2025-06-25 ENCOUNTER — OFFICE VISIT (OUTPATIENT)
Dept: ORTHOPEDICS | Facility: CLINIC | Age: 70
End: 2025-06-25
Payer: MEDICARE

## 2025-06-25 ENCOUNTER — HOSPITAL ENCOUNTER (OUTPATIENT)
Dept: RADIOLOGY | Facility: HOSPITAL | Age: 70
Discharge: HOME | End: 2025-06-25
Attending: ORTHOPAEDIC SURGERY
Payer: MEDICARE

## 2025-06-25 DIAGNOSIS — S52.571A OTHER CLOSED INTRA-ARTICULAR FRACTURE OF DISTAL END OF RIGHT RADIUS, INITIAL ENCOUNTER: ICD-10-CM

## 2025-06-25 DIAGNOSIS — S52.571A OTHER CLOSED INTRA-ARTICULAR FRACTURE OF DISTAL END OF RIGHT RADIUS, INITIAL ENCOUNTER: Primary | ICD-10-CM

## 2025-06-25 PROCEDURE — 3288F FALL RISK ASSESSMENT DOCD: CPT | Mod: CPTII,S$GLB,, | Performed by: ORTHOPAEDIC SURGERY

## 2025-06-25 PROCEDURE — 99024 POSTOP FOLLOW-UP VISIT: CPT | Mod: S$GLB,,, | Performed by: ORTHOPAEDIC SURGERY

## 2025-06-25 PROCEDURE — 73110 X-RAY EXAM OF WRIST: CPT | Mod: TC,PO,RT

## 2025-06-25 PROCEDURE — 1159F MED LIST DOCD IN RCRD: CPT | Mod: CPTII,S$GLB,, | Performed by: ORTHOPAEDIC SURGERY

## 2025-06-25 PROCEDURE — 1101F PT FALLS ASSESS-DOCD LE1/YR: CPT | Mod: CPTII,S$GLB,, | Performed by: ORTHOPAEDIC SURGERY

## 2025-06-25 PROCEDURE — 3044F HG A1C LEVEL LT 7.0%: CPT | Mod: CPTII,S$GLB,, | Performed by: ORTHOPAEDIC SURGERY

## 2025-06-25 PROCEDURE — 99999 PR PBB SHADOW E&M-EST. PATIENT-LVL III: CPT | Mod: PBBFAC,,, | Performed by: ORTHOPAEDIC SURGERY

## 2025-06-25 PROCEDURE — 73110 X-RAY EXAM OF WRIST: CPT | Mod: 26,RT,, | Performed by: RADIOLOGY

## 2025-06-25 PROCEDURE — 1126F AMNT PAIN NOTED NONE PRSNT: CPT | Mod: CPTII,S$GLB,, | Performed by: ORTHOPAEDIC SURGERY

## 2025-06-25 NOTE — PROGRESS NOTES
Mr Duke returns to clinic today.  He is status post right wrist open reduction internal fixation of the radius and ulna fracture.  He is overall doing well.  He is 4 weeks postop     Physical exam:  Examination of the right wrist reveals that both wounds are healing in well.  There is only mild edema.  There was no erythema.  He is neurovascularly intact distally.      Radiology: X-rays of the right wrist were taken in clinic today.  He is noted have fractures of the distal radius and the ulna.  These were stabilized with plate and screw fixation.  The alignment is still well-maintained and fixation is stable     Assessment: Right distal radius and ulna fractures     Plan:    1. Will place him into a Velcro wrist splint     2.  We have discussed therapy but he would like to attempt a home range of motion program 1st.  I think that this has reasonable     3. He will continue limited weight-bearing     4.  Will follow up in 2 weeks with an x-ray of the right wrist at which point we will discuss any need for formal therapy

## 2025-06-26 DIAGNOSIS — M25.531 RIGHT WRIST PAIN: Primary | ICD-10-CM

## 2025-06-30 ENCOUNTER — TELEPHONE (OUTPATIENT)
Facility: CLINIC | Age: 70
End: 2025-06-30
Payer: MEDICARE

## 2025-07-09 ENCOUNTER — OFFICE VISIT (OUTPATIENT)
Dept: ORTHOPEDICS | Facility: CLINIC | Age: 70
End: 2025-07-09
Payer: MEDICARE

## 2025-07-09 ENCOUNTER — HOSPITAL ENCOUNTER (OUTPATIENT)
Dept: RADIOLOGY | Facility: HOSPITAL | Age: 70
Discharge: HOME | End: 2025-07-09
Attending: PHYSICIAN ASSISTANT
Payer: MEDICARE

## 2025-07-09 DIAGNOSIS — S52.691A OTHER CLOSED FRACTURE OF DISTAL END OF RIGHT ULNA, INITIAL ENCOUNTER: ICD-10-CM

## 2025-07-09 DIAGNOSIS — M25.531 RIGHT WRIST PAIN: ICD-10-CM

## 2025-07-09 DIAGNOSIS — S52.571A OTHER CLOSED INTRA-ARTICULAR FRACTURE OF DISTAL END OF RIGHT RADIUS, INITIAL ENCOUNTER: Primary | ICD-10-CM

## 2025-07-09 PROCEDURE — 1126F AMNT PAIN NOTED NONE PRSNT: CPT | Mod: CPTII,S$GLB,, | Performed by: PHYSICIAN ASSISTANT

## 2025-07-09 PROCEDURE — 3044F HG A1C LEVEL LT 7.0%: CPT | Mod: CPTII,S$GLB,, | Performed by: PHYSICIAN ASSISTANT

## 2025-07-09 PROCEDURE — 99999 PR PBB SHADOW E&M-EST. PATIENT-LVL III: CPT | Mod: PBBFAC,,, | Performed by: PHYSICIAN ASSISTANT

## 2025-07-09 PROCEDURE — 99024 POSTOP FOLLOW-UP VISIT: CPT | Mod: S$GLB,,, | Performed by: PHYSICIAN ASSISTANT

## 2025-07-09 PROCEDURE — 1160F RVW MEDS BY RX/DR IN RCRD: CPT | Mod: CPTII,S$GLB,, | Performed by: PHYSICIAN ASSISTANT

## 2025-07-09 PROCEDURE — 1159F MED LIST DOCD IN RCRD: CPT | Mod: CPTII,S$GLB,, | Performed by: PHYSICIAN ASSISTANT

## 2025-07-09 PROCEDURE — 1101F PT FALLS ASSESS-DOCD LE1/YR: CPT | Mod: CPTII,S$GLB,, | Performed by: PHYSICIAN ASSISTANT

## 2025-07-09 PROCEDURE — 3288F FALL RISK ASSESSMENT DOCD: CPT | Mod: CPTII,S$GLB,, | Performed by: PHYSICIAN ASSISTANT

## 2025-07-09 PROCEDURE — 73110 X-RAY EXAM OF WRIST: CPT | Mod: 26,RT,, | Performed by: RADIOLOGY

## 2025-07-09 PROCEDURE — 73110 X-RAY EXAM OF WRIST: CPT | Mod: TC,PO,RT

## 2025-07-09 NOTE — PROGRESS NOTES
Bossman Duke is a 69 y.o. male who presents to clinic for follow up status post right distal radius fracture and right distal ulna shaft fracture open reduction internal fixations.  He is about 6 weeks status post surgery.  States overall he is doing very well.  States he has been wearing the Velcro splint as instructed.  States that has performing the range-of-motion exercises as instructed.  Denies acute injuries since his last visit.  Denies any other complaints this time.    Physical Exam:  Examination of the right wrist reveals minimal edema.  Presence of well-healed surgical sites.  No erythema, ecchymosis, or skin breakdown.  Moderately reduced range of motion in regards to flexion/extension of the right wrist.  Able make composite fist and fully extend all extend all fingers.  Normal sensation in the radial, ulnar, median nerve distributions.  Capillary refill is than 2 seconds.    Radiology:  X-rays of the right wrist were taken today in clinic.  X-rays read by myself.  X-rays compared to previous imaging.  Imaging showed the presence of a distal radius and distal ulna fractures that has been stabilized via plate and screw fixation with routine healing.  No orthopedic hardware complications noted.  No other significant bony abnormalities noted.    Assessment:  Status post right distal radius and distal ulna fractures open reduction internal fixation    Plan:  1. I discussed with Bossman Duke that he is progressing appropriately in the postoperative course we discussed the best course of action this time is to continue immobilization via a removable Velcro wrist splint of the right wrist at all times only to be for bathing and range-of-motion exercises.  We discussed the possibility of formal occupational therapy, but he denied this time in favor of performing the exercises and stretches on his own.  He verbally agreed with the treatment plan.      2.  I would like him follow up in clinic in 2 weeks for  repeat evaluation at which time perform repeat x-rays of the right wrist.  He was instructed to contact the clinic for any problems or concerns in the interim.

## 2025-07-15 DIAGNOSIS — M25.531 RIGHT WRIST PAIN: Primary | ICD-10-CM

## 2025-07-23 ENCOUNTER — ANESTHESIA EVENT (OUTPATIENT)
Dept: ENDOSCOPY | Facility: HOSPITAL | Age: 70
End: 2025-07-23
Payer: MEDICARE

## 2025-07-24 ENCOUNTER — HOSPITAL ENCOUNTER (OUTPATIENT)
Facility: HOSPITAL | Age: 70
Discharge: HOME | End: 2025-07-24
Attending: INTERNAL MEDICINE | Admitting: INTERNAL MEDICINE
Payer: MEDICARE

## 2025-07-24 ENCOUNTER — HOSPITAL ENCOUNTER (OUTPATIENT)
Dept: CARDIOLOGY | Facility: HOSPITAL | Age: 70
Discharge: HOME | End: 2025-07-24
Attending: INTERNAL MEDICINE
Payer: MEDICARE

## 2025-07-24 ENCOUNTER — ANESTHESIA (OUTPATIENT)
Dept: ENDOSCOPY | Facility: HOSPITAL | Age: 70
End: 2025-07-24
Payer: MEDICARE

## 2025-07-24 DIAGNOSIS — I48.4 ATYPICAL ATRIAL FLUTTER: ICD-10-CM

## 2025-07-24 DIAGNOSIS — I48.19 PERSISTENT ATRIAL FIBRILLATION: ICD-10-CM

## 2025-07-24 DIAGNOSIS — I10 PRIMARY HYPERTENSION: ICD-10-CM

## 2025-07-24 DIAGNOSIS — I48.91 A-FIB: ICD-10-CM

## 2025-07-24 DIAGNOSIS — I50.32 CHRONIC DIASTOLIC CONGESTIVE HEART FAILURE: ICD-10-CM

## 2025-07-24 LAB
OHS QRS DURATION: 88 MS
OHS QTC CALCULATION: 416 MS

## 2025-07-24 PROCEDURE — 93010 ELECTROCARDIOGRAM REPORT: CPT | Mod: XE,,, | Performed by: INTERNAL MEDICINE

## 2025-07-24 PROCEDURE — 63600175 PHARM REV CODE 636 W HCPCS: Mod: PO | Performed by: ANESTHESIOLOGY

## 2025-07-24 PROCEDURE — 63600175 PHARM REV CODE 636 W HCPCS: Mod: PO | Performed by: NURSE ANESTHETIST, CERTIFIED REGISTERED

## 2025-07-24 PROCEDURE — 37000008 HC ANESTHESIA 1ST 15 MINUTES: Mod: PO | Performed by: INTERNAL MEDICINE

## 2025-07-24 PROCEDURE — 93005 ELECTROCARDIOGRAM TRACING: CPT | Mod: PO

## 2025-07-24 PROCEDURE — 93325 DOPPLER ECHO COLOR FLOW MAPG: CPT | Mod: PO

## 2025-07-24 PROCEDURE — 37000009 HC ANESTHESIA EA ADD 15 MINS: Mod: PO | Performed by: INTERNAL MEDICINE

## 2025-07-24 RX ORDER — DIPHENHYDRAMINE HYDROCHLORIDE 50 MG/ML
25 INJECTION, SOLUTION INTRAMUSCULAR; INTRAVENOUS EVERY 6 HOURS PRN
Status: DISCONTINUED | OUTPATIENT
Start: 2025-07-24 | End: 2025-07-24 | Stop reason: HOSPADM

## 2025-07-24 RX ORDER — GLUCAGON 1 MG
1 KIT INJECTION
Status: DISCONTINUED | OUTPATIENT
Start: 2025-07-24 | End: 2025-07-24 | Stop reason: HOSPADM

## 2025-07-24 RX ORDER — FENTANYL CITRATE 50 UG/ML
25 INJECTION, SOLUTION INTRAMUSCULAR; INTRAVENOUS EVERY 5 MIN PRN
Status: DISCONTINUED | OUTPATIENT
Start: 2025-07-24 | End: 2025-07-24 | Stop reason: HOSPADM

## 2025-07-24 RX ORDER — OXYCODONE HYDROCHLORIDE 5 MG/1
5 TABLET ORAL
Status: DISCONTINUED | OUTPATIENT
Start: 2025-07-24 | End: 2025-07-24 | Stop reason: HOSPADM

## 2025-07-24 RX ORDER — ASPIRIN 81 MG/1
81 TABLET ORAL DAILY
Start: 2025-07-24 | End: 2026-07-24

## 2025-07-24 RX ORDER — SODIUM CHLORIDE 9 MG/ML
INJECTION, SOLUTION INTRAVENOUS CONTINUOUS
Status: DISCONTINUED | OUTPATIENT
Start: 2025-07-24 | End: 2025-07-24 | Stop reason: HOSPADM

## 2025-07-24 RX ORDER — SODIUM CHLORIDE, SODIUM LACTATE, POTASSIUM CHLORIDE, CALCIUM CHLORIDE 600; 310; 30; 20 MG/100ML; MG/100ML; MG/100ML; MG/100ML
INJECTION, SOLUTION INTRAVENOUS CONTINUOUS
Status: DISCONTINUED | OUTPATIENT
Start: 2025-07-24 | End: 2025-07-24 | Stop reason: HOSPADM

## 2025-07-24 RX ORDER — MUPIROCIN 20 MG/G
OINTMENT TOPICAL
Status: DISCONTINUED | OUTPATIENT
Start: 2025-07-24 | End: 2025-07-24 | Stop reason: HOSPADM

## 2025-07-24 RX ORDER — PROPOFOL 10 MG/ML
VIAL (ML) INTRAVENOUS
Status: DISCONTINUED | OUTPATIENT
Start: 2025-07-24 | End: 2025-07-24

## 2025-07-24 RX ORDER — SODIUM CHLORIDE 0.9 % (FLUSH) 0.9 %
10 SYRINGE (ML) INJECTION ONCE
Status: DISCONTINUED | OUTPATIENT
Start: 2025-07-24 | End: 2025-07-24 | Stop reason: HOSPADM

## 2025-07-24 RX ORDER — LIDOCAINE HYDROCHLORIDE 10 MG/ML
1 INJECTION, SOLUTION EPIDURAL; INFILTRATION; INTRACAUDAL; PERINEURAL ONCE
Status: DISCONTINUED | OUTPATIENT
Start: 2025-07-24 | End: 2025-07-24 | Stop reason: HOSPADM

## 2025-07-24 RX ORDER — HYDROMORPHONE HYDROCHLORIDE 2 MG/ML
0.2 INJECTION, SOLUTION INTRAMUSCULAR; INTRAVENOUS; SUBCUTANEOUS EVERY 5 MIN PRN
Status: DISCONTINUED | OUTPATIENT
Start: 2025-07-24 | End: 2025-07-24 | Stop reason: HOSPADM

## 2025-07-24 RX ORDER — PROCHLORPERAZINE EDISYLATE 5 MG/ML
5 INJECTION INTRAMUSCULAR; INTRAVENOUS EVERY 4 HOURS PRN
Status: DISCONTINUED | OUTPATIENT
Start: 2025-07-24 | End: 2025-07-24 | Stop reason: HOSPADM

## 2025-07-24 RX ORDER — LIDOCAINE HYDROCHLORIDE 20 MG/ML
INJECTION INTRAVENOUS
Status: DISCONTINUED | OUTPATIENT
Start: 2025-07-24 | End: 2025-07-24

## 2025-07-24 RX ADMIN — LIDOCAINE HYDROCHLORIDE 100 MG: 20 INJECTION INTRAVENOUS at 12:07

## 2025-07-24 RX ADMIN — PROPOFOL 100 MG: 10 INJECTION, EMULSION INTRAVENOUS at 12:07

## 2025-07-24 RX ADMIN — SODIUM CHLORIDE, POTASSIUM CHLORIDE, SODIUM LACTATE AND CALCIUM CHLORIDE: 600; 310; 30; 20 INJECTION, SOLUTION INTRAVENOUS at 12:07

## 2025-07-24 RX ADMIN — PROPOFOL 25 MG: 10 INJECTION, EMULSION INTRAVENOUS at 12:07

## 2025-07-24 NOTE — PLAN OF CARE
Spoke to TR Noonan at Dr. Puckett's office to make him aware the patient took his Xarelto yesterday evening when the instructions stated to hold for two days. I also made him aware the patient had a sinus infection while taking over the counter medication for it. He stated he was OK with procedure proceeding.

## 2025-07-24 NOTE — H&P
[facility]  Cardiology  History and Physical     Patient Name: Bossman Duke  MRN: [id number]  Admission Date: 7/24/2025  Code Status: Full Code   Attending Provider: Pritesh Puckett MD   Primary Care Physician: Jaren Macias MD  Principal Problem:<principal problem not specified>    Patient information was obtained from patient.     Subjective:     Chief Complaint:  JOHN     HPI:  Patient presents for JOHN to ensure left atrial appendage was ligated from previous surgery.    Past Medical History:   Diagnosis Date    Anticoagulant long-term use     Arthritis     COPD (chronic obstructive pulmonary disease)     Coronary artery disease     Diabetes mellitus     Hypertension     PAD (peripheral artery disease)     PUD (peptic ulcer disease)     Sleep apnea     CPAP    Vertigo        Past Surgical History:   Procedure Laterality Date    ABLATION AND RECONSTRUCTION, CARDIAC ATRIUM, WITH LIMITED TISSUE ABLATION N/A 5/20/2024    Procedure: ABLATION AND RECONSTRUCTION, CARDIAC ATRIUM, WITH LIMITED TISSUE ABLATION;  Surgeon: Migdalia Roberts MD;  Location: [facility] OR;  Service: Cardiothoracic;  Laterality: N/A;    ABLATION, ATRIAL FIBRILLATION, CRYO  1/7/2025    Procedure: Ablation A-Fib Re-Do RFA;  Surgeon: Pritesh Puckett MD;  Location: [facility] CATH;  Service: Cardiology;;    ANGIOGRAM, CORONARY, WITH LEFT HEART CATHETERIZATION  4/24/2024    Procedure: Left Heart Cath;  Surgeon: Jl Neil MD;  Location: [facility] CATH;  Service: Cardiology;;    APPENDECTOMY      CERVICAL SPINE SURGERY      CORONARY ARTERY BYPASS GRAFT (CABG) N/A 5/20/2024    Procedure: CORONARY ARTERY BYPASS GRAFT (CABG) x3;  Surgeon: Migdalia Roberts MD;  Location: [facility] OR;  Service: Cardiothoracic;  Laterality: N/A;    ECHOCARDIOGRAM,TRANSESOPHAGEAL N/A 5/20/2024    Procedure: Transesophageal echo (JOHN) intra-procedure log documentation;  Surgeon: Migdalia Roberts MD;  Location: [facility] OR;  Service: Cardiothoracic;  Laterality: N/A;     "ECHOCARDIOGRAM,TRANSESOPHAGEAL  1/7/2025    Procedure: (JOHN) intra-procedure;  Surgeon: Pritesh Puckett MD;  Location: [facility] CATH;  Service: Cardiology;;    ENDOSCOPIC HARVEST OF VEIN Right 5/20/2024    Procedure: HARVEST-VEIN-ENDOVASCULAR;  Surgeon: Migdalia Roberts MD;  Location: [facility] OR;  Service: Cardiothoracic;  Laterality: Right;    EPIDURAL STEROID INJECTION INTO LUMBAR SPINE N/A 10/5/2022    Procedure: Injection-steroid-epidural-lumbar L3/4;  Surgeon: Linwood Aponte MD;  Location: [facility] [place];  Service: Pain Management;  Laterality: N/A;    EXCLUSION, LEFT ATRIAL APPENDAGE, OPEN, AS PART OF OPEN CHEST SURGERY Left 5/20/2024    Procedure: EXCLUSION, LEFT ATRIAL APPENDAGE, OPEN, AS PART OF OPEN CHEST SURGERY;  Surgeon: Migdalia Roberts MD;  Location: [facility] OR;  Service: Cardiothoracic;  Laterality: Left;    EYE SURGERY Left     "as  a child"    INJECTION OF ANESTHETIC AGENT AROUND MEDIAL BRANCH NERVES INNERVATING LUMBAR FACET JOINT Bilateral 2/9/2021    Procedure: Block-nerve-medial branch-lumbar L3/4, L4/5, L5/S1;  Surgeon: Linwood Aponte MD;  Location: [facility] OR;  Service: Pain Management;  Laterality: Bilateral;    LUMBAR DISC SURGERY      OPEN REDUCTION AND INTERNAL FIXATION (ORIF) OF FRACTURE OF DISTAL RADIUS Right 5/27/2025    Procedure: Right distal radius fracture open reduction internal fixation;  Surgeon: Mark Leonard MD;  Location: [facility] OR;  Service: Orthopedics;  Laterality: Right;  Anesthesia: General with a single-shot supraclavicular Exparel block    OPEN REDUCTION AND INTERNAL FIXATION (ORIF) OF FRACTURE OF ULNA Right 5/27/2025    Procedure: Right distal ulna shaft fracture open reduction internal fixation;  Surgeon: Mark Leonard MD;  Location: [facility] OR;  Service: Orthopedics;  Laterality: Right;  Anesthesia: General with a single-shot supraclavicular Exparel block    PERCUTANEOUS TRANSLUMINAL ANGIOPLASTY N/A 2/15/2021    Procedure: Pta (Angioplasty, Percutaneous, Transluminal);  Surgeon: " Jl Neil MD;  Location: [facility];  Service: Cardiology;  Laterality: N/A;    RADIOFREQUENCY ABLATION Right 3/1/2021    Procedure: Radiofrequency Ablation L3/4, L4/5;  Surgeon: Linwood Aponte MD;  Location: [facility] OR;  Service: Pain Management;  Laterality: Right;    RADIOFREQUENCY ABLATION Left 3/10/2021    Procedure: Radiofrequency Ablation L3/4, L4/5;  Surgeon: Linwood Aponte MD;  Location: [facility] OR;  Service: Pain Management;  Laterality: Left;    RADIOFREQUENCY ABLATION OF LUMBAR MEDIAL BRANCH NERVE AT SINGLE LEVEL Bilateral 8/23/2022    Procedure: Radiofrequency Ablation, Nerve, Spinal, Lumbar, Medial Branch, L3/4, L4/5;  Surgeon: Linwood Aponte MD;  Location: [facility] OR;  Service: Pain Management;  Laterality: Bilateral;       Review of patient's allergies indicates:  No Known Allergies    No current facility-administered medications on file prior to encounter.     Current Outpatient Medications on File Prior to Encounter   Medication Sig    furosemide (LASIX) 40 MG tablet Take 1 tablet (40 mg total) by mouth 2 (two) times daily.    metoprolol succinate (TOPROL-XL) 50 MG 24 hr tablet Take 1 tablet (50 mg total) by mouth once daily.    potassium chloride (KLOR-CON) 10 MEQ TbSR Take 1 tablet (10 mEq total) by mouth 2 (two) times daily.    rivaroxaban (XARELTO) 20 mg Tab Take 1 tablet (20 mg total) by mouth daily with dinner or evening meal.    atorvastatin (LIPITOR) 20 MG tablet Take 1 tablet (20 mg total) by mouth once daily.    baclofen (LIORESAL) 5 mg Tab tablet Take 1 tablet (5 mg total) by mouth 2 (two) times daily as needed (muscle spasms).    budesonide (PULMICORT) 0.5 mg/2 mL nebulizer solution Take 2 mLs (0.5 mg total) by nebulization 2 (two) times daily. Controller    levalbuterol (XOPENEX) 1.25 mg/3 mL nebulizer solution Take 3 mLs (1.25 mg total) by nebulization every 6 (six) hours as needed for Wheezing. Rescue    lubiprostone (AMITIZA) 8 MCG Cap Take 1 capsule (8 mcg total) by mouth 2  (two) times daily with meals.    ondansetron (ZOFRAN-ODT) 8 MG TbDL Take 1 tablet (8 mg total) by mouth every 8 (eight) hours as needed (Nausea).    [DISCONTINUED] lisinopriL-hydrochlorothiazide (PRINZIDE,ZESTORETIC) 20-12.5 mg per tablet Take 1 tablet by mouth 2 (two) times a day.    [DISCONTINUED] metFORMIN (GLUCOPHAGE-XR) 500 MG ER 24hr tablet Take 1 tablet (500 mg total) by mouth daily with breakfast. (Patient not taking: Reported on 2025)     Family History       Problem Relation (Age of Onset)    Diabetes Mother    Heart disease Mother, Father          Tobacco Use    Smoking status: Former     Current packs/day: 0.00     Average packs/day: 2.0 packs/day for 56.0 years (112.0 ttl pk-yrs)     Types: Cigarettes     Start date:      Quit date:      Years since quittin.5    Smokeless tobacco: Never   Substance and Sexual Activity    Alcohol use: Yes     Comment: occ    Drug use: Never    Sexual activity: Not on file     Review of Systems   All other systems reviewed and are negative.    Objective:     Vital Signs (Most Recent):  Temp: 97.4 °F (36.3 °C) (25 1139)  Pulse: 94 (25 1139)  Resp: 16 (25 1139)  BP: (!) 145/93 (25 1139)  SpO2: 95 % (25 1139) Vital Signs (24h Range):  Temp:  [97.4 °F (36.3 °C)] 97.4 °F (36.3 °C)  Pulse:  [94] 94  Resp:  [16] 16  SpO2:  [95 %] 95 %  BP: (145)/(93) 145/93     Weight: 130.6 kg (288 lb)  Body mass index is 41.32 kg/m².    SpO2: 95 %       No intake or output data in the 24 hours ending 25 1150    Lines/Drains/Airways       None                   Physical Exam  Vitals and nursing note reviewed.   Constitutional:       General: He is not in acute distress.     Appearance: He is well-developed. He is not diaphoretic.   HENT:      Head: Normocephalic and atraumatic.   Eyes:      General: No scleral icterus.        Right eye: No discharge.         Left eye: No discharge.   Cardiovascular:      Rate and Rhythm: Normal rate and  regular rhythm. No extrasystoles are present.     Pulses: Normal pulses and intact distal pulses.           Radial pulses are 2+ on the right side and 2+ on the left side.      Heart sounds: Normal heart sounds, S1 normal and S2 normal. Heart sounds not distant. No opening snap. No murmur heard.     No friction rub. No gallop. No S3 or S4 sounds.   Pulmonary:      Effort: Pulmonary effort is normal. No respiratory distress.      Breath sounds: No wheezing or rales.   Abdominal:      General: Bowel sounds are normal. There is no distension.      Palpations: Abdomen is soft.      Tenderness: There is no abdominal tenderness.   Musculoskeletal:         General: No deformity. Normal range of motion.      Cervical back: Normal range of motion and neck supple.   Skin:     General: Skin is warm and dry.      Findings: No erythema or rash.   Neurological:      Mental Status: He is alert.         Significant Labs: All pertinent lab results from the last 24 hours have been reviewed.    Significant Imaging: reviewed  Assessment and Plan:   JOHN to ensure left atrial appendage is completely ligated    The risks, benefits and alternatives of transesophageal echocardiogram (JOHN) were explained to the patient, patient's family and/or surrogate decision maker.  No absolute contraindications of esophageal stricture, tumor, perforation, laceration,or diverticulum and/or active GI bleed. The risks include (but are not limited to) trauma to oral structures, esophageal trauma/perforation, bleeding, laryngospasm/brochospasm, infection, aspiration, sore throat/hoarseness, CVA, MI, and death. All questions were answered. The patient is understanding of these risks and would like to proceed.        VTE Risk Mitigation (From admission, onward)      None            Pritesh Puckett MD  Cardiology   [place] - [facility]

## 2025-07-24 NOTE — ANESTHESIA PREPROCEDURE EVALUATION
07/24/2025  Bossman Duke is a 70 y.o., male.      Pre-op Assessment          Review of Systems  Cardiovascular:     Hypertension   CAD       CHF                Shortness of Breath    Coronary Artery Disease:               Congestive Heart Failure (CHF)                Hypertension     Atrial Fibrillation     Pulmonary:   COPD   Shortness of breath  Sleep Apnea    Chronic Obstructive Pulmonary Disease (COPD):           Obstructive Sleep Apnea (BIB).           Hepatic/GI:   PUD,           Peptic Ulcer Disease          Endocrine:  Diabetes    Diabetes                          Physical Exam  General: Well nourished        Anesthesia Plan  Type of Anesthesia, risks & benefits discussed:    Anesthesia Type: Gen Natural Airway  Intra-op Monitoring Plan: Standard ASA Monitors  Induction:  IV  Informed Consent: Informed consent signed with the Patient and all parties understand the risks and agree with anesthesia plan.  All questions answered.   ASA Score: 4  Day of Surgery Review of History & Physical: H&P Update referred to the surgeon/provider.    Ready For Surgery From Anesthesia Perspective.     .

## 2025-07-24 NOTE — DISCHARGE SUMMARY
[facility]  Cardiology  Discharge Summary      Patient Name: Bossman Duke  MRN: [id number]  Admission Date: 7/24/2025  Hospital Length of Stay: 0 days  Discharge Date and Time: 07/24/2025 12:53 PM  Attending Physician: Pritesh Puckett MD  Discharging Provider: Pritesh Puckett MD  Primary Care Physician: Jaren Macias MD      Hospital Course:   Carter showed complete ligation of left atrial appendage.  Discontinue anticoagulation and initiate aspirin 81 mg QD.    HPI:  Patient presented for CARTER.    Procedure(s) (LRB):  Transesophageal echo (CARTER) intra-procedure log documentation (N/A)     Indwelling Lines/Drains at time of discharge:  Lines/Drains/Airways       None                 Consults:     Significant Diagnostic Studies: Labs: All labs within the past 24 hours have been reviewed    Pending Diagnostic Studies:       None            There are no hospital problems to display for this patient.      Discharged Condition: stable    Follow Up:    Patient Instructions:   No discharge procedures on file.  Medications:  Reconciled Home Medications:      Medication List        START taking these medications      aspirin 81 MG EC tablet  Commonly known as: ECOTRIN  Take 1 tablet (81 mg total) by mouth once daily.            CONTINUE taking these medications      atorvastatin 20 MG tablet  Commonly known as: LIPITOR  Take 1 tablet (20 mg total) by mouth once daily.     baclofen 5 mg Tab tablet  Commonly known as: LIORESAL  Take 1 tablet (5 mg total) by mouth 2 (two) times daily as needed (muscle spasms).     budesonide 0.5 mg/2 mL nebulizer solution  Commonly known as: PULMICORT  Take 2 mLs (0.5 mg total) by nebulization 2 (two) times daily. Controller     furosemide 40 MG tablet  Commonly known as: LASIX  Take 1 tablet (40 mg total) by mouth 2 (two) times daily.     levalbuterol 1.25 mg/3 mL nebulizer solution  Commonly known as: XOPENEX  Take 3 mLs (1.25 mg total) by nebulization every 6 (six) hours as  needed for Wheezing. Rescue     lubiprostone 8 MCG Cap  Commonly known as: AMITIZA  Take 1 capsule (8 mcg total) by mouth 2 (two) times daily with meals.     metoprolol succinate 50 MG 24 hr tablet  Commonly known as: TOPROL-XL  Take 1 tablet (50 mg total) by mouth once daily.     ondansetron 8 MG Tbdl  Commonly known as: ZOFRAN-ODT  Take 1 tablet (8 mg total) by mouth every 8 (eight) hours as needed (Nausea).     potassium chloride 10 MEQ Tbsr  Commonly known as: KLOR-CON  Take 1 tablet (10 mEq total) by mouth 2 (two) times daily.     traZODone 50 MG tablet  Commonly known as: DESYREL  Take 1.5 tablets (75 mg total) by mouth every evening.            STOP taking these medications      XARELTO 20 mg Tab  Generic drug: rivaroxaban              Time spent on the discharge of patient: 30 minutes    Pritesh Puckett MD  Cardiology  [place] - End[facility]

## 2025-07-24 NOTE — DISCHARGE INSTRUCTIONS
JOHN     DO:  Resume all medications today unless otherwise instructed.    DO NOT:  Drive for 24 hours.  Operate heavy machinery.  Sign legal papers.  Make major decisions.  Drink alcohol or smoke for 24 hours.    WHAT TO EXPECT:  Sore throat. This can be soothed with ice chips or lozenges.    Your physician will contact you with results and further instruct you on how you should follow-up.    Contact [phone] for questions.

## 2025-07-24 NOTE — TRANSFER OF CARE
"Anesthesia Transfer of Care Note    Patient: Bossman Duke    Procedure(s) Performed: Procedure(s) (LRB):  Transesophageal echo (JOHN) intra-procedure log documentation (N/A)    Patient location: PACU    Anesthesia Type: general    Transport from OR: Transported from OR on room air with adequate spontaneous ventilation    Post pain: adequate analgesia    Post assessment: no apparent anesthetic complications and tolerated procedure well    Post vital signs: stable    Level of consciousness: sedated    Nausea/Vomiting: no nausea/vomiting    Complications: none    Transfer of care protocol was followed      Last vitals: Visit Vitals  BP (!) 145/93 (BP Location: Right arm, Patient Position: Lying)   Pulse 94   Temp 36.3 °C (97.4 °F) (Skin)   Resp 16   Ht 5' 10" (1.778 m)   Wt 130.6 kg (288 lb)   SpO2 95%   BMI 41.32 kg/m²     "

## 2025-07-25 VITALS
SYSTOLIC BLOOD PRESSURE: 127 MMHG | HEART RATE: 90 BPM | TEMPERATURE: 98 F | OXYGEN SATURATION: 96 % | DIASTOLIC BLOOD PRESSURE: 69 MMHG | WEIGHT: 288 LBS | HEIGHT: 70 IN | BODY MASS INDEX: 41.23 KG/M2 | RESPIRATION RATE: 12 BRPM

## 2025-07-25 NOTE — ANESTHESIA POSTPROCEDURE EVALUATION
Anesthesia Post Evaluation    Patient: Bossman Duke    Procedure(s) Performed: Procedure(s) (LRB):  Transesophageal echo (JOHN) intra-procedure log documentation (N/A)    Final Anesthesia Type: general      Patient location during evaluation: PACU  Patient participation: Yes- Able to Participate  Level of consciousness: awake and alert  Post-procedure vital signs: reviewed and stable  Pain management: adequate  Airway patency: patent    PONV status at discharge: No PONV  Anesthetic complications: no      Cardiovascular status: blood pressure returned to baseline  Respiratory status: unassisted and room air  Hydration status: euvolemic  Follow-up not needed.              Vitals Value Taken Time   /69 07/24/25 13:13   Temp 36.7 °C (98 °F) 07/24/25 13:13   Pulse 90 07/24/25 13:13   Resp 12 07/24/25 13:13   SpO2 96 % 07/24/25 13:13         Event Time   Out of Recovery 13:23:00         Pain/Kenyetta Score: Kenyetta Score: 10 (7/24/2025  1:08 PM)

## 2025-07-26 LAB
BSA FOR ECHO PROCEDURE: 2.54 M2
OHS CV CPX PATIENT HEIGHT IN: 70

## 2025-07-30 DIAGNOSIS — I25.10 CORONARY ARTERY DISEASE, UNSPECIFIED VESSEL OR LESION TYPE, UNSPECIFIED WHETHER ANGINA PRESENT, UNSPECIFIED WHETHER NATIVE OR TRANSPLANTED HEART: ICD-10-CM

## 2025-07-30 DIAGNOSIS — I48.19 PERSISTENT ATRIAL FIBRILLATION: ICD-10-CM

## 2025-07-30 DIAGNOSIS — I10 PRIMARY HYPERTENSION: ICD-10-CM

## 2025-07-30 DIAGNOSIS — M25.569 KNEE PAIN, UNSPECIFIED CHRONICITY, UNSPECIFIED LATERALITY: Primary | ICD-10-CM

## 2025-07-30 DIAGNOSIS — I25.118 CORONARY ARTERY DISEASE INVOLVING NATIVE CORONARY ARTERY OF NATIVE HEART WITH OTHER FORM OF ANGINA PECTORIS: ICD-10-CM

## 2025-07-30 RX ORDER — POTASSIUM CHLORIDE 750 MG/1
10 TABLET, EXTENDED RELEASE ORAL 2 TIMES DAILY
Qty: 60 TABLET | Refills: 2 | Status: CANCELLED | OUTPATIENT
Start: 2025-07-30

## 2025-07-30 RX ORDER — METOPROLOL SUCCINATE 50 MG/1
50 TABLET, EXTENDED RELEASE ORAL DAILY
Qty: 90 TABLET | Refills: 3 | Status: CANCELLED | OUTPATIENT
Start: 2025-07-30 | End: 2026-07-30

## 2025-07-30 RX ORDER — METOPROLOL SUCCINATE 50 MG/1
50 TABLET, EXTENDED RELEASE ORAL DAILY
Qty: 90 TABLET | Refills: 3 | Status: SHIPPED | OUTPATIENT
Start: 2025-07-30 | End: 2026-07-30

## 2025-07-31 DIAGNOSIS — M25.531 RIGHT WRIST PAIN: Primary | ICD-10-CM

## 2025-07-31 RX ORDER — POTASSIUM CHLORIDE 750 MG/1
10 TABLET, EXTENDED RELEASE ORAL 2 TIMES DAILY
Qty: 60 TABLET | Refills: 2 | Status: SHIPPED | OUTPATIENT
Start: 2025-07-31

## 2025-08-04 ENCOUNTER — OFFICE VISIT (OUTPATIENT)
Dept: ORTHOPEDICS | Facility: CLINIC | Age: 70
End: 2025-08-04
Payer: MEDICARE

## 2025-08-04 ENCOUNTER — HOSPITAL ENCOUNTER (OUTPATIENT)
Dept: RADIOLOGY | Facility: HOSPITAL | Age: 70
Discharge: HOME | End: 2025-08-04
Attending: ORTHOPAEDIC SURGERY
Payer: MEDICARE

## 2025-08-04 DIAGNOSIS — M25.569 KNEE PAIN, UNSPECIFIED CHRONICITY, UNSPECIFIED LATERALITY: ICD-10-CM

## 2025-08-04 DIAGNOSIS — M17.0 BILATERAL PRIMARY OSTEOARTHRITIS OF KNEE: ICD-10-CM

## 2025-08-04 PROCEDURE — 1100F PTFALLS ASSESS-DOCD GE2>/YR: CPT | Mod: CPTII,S$GLB,, | Performed by: ORTHOPAEDIC SURGERY

## 2025-08-04 PROCEDURE — 73562 X-RAY EXAM OF KNEE 3: CPT | Mod: 26,50,, | Performed by: RADIOLOGY

## 2025-08-04 PROCEDURE — 3288F FALL RISK ASSESSMENT DOCD: CPT | Mod: CPTII,S$GLB,, | Performed by: ORTHOPAEDIC SURGERY

## 2025-08-04 PROCEDURE — 1126F AMNT PAIN NOTED NONE PRSNT: CPT | Mod: CPTII,S$GLB,, | Performed by: ORTHOPAEDIC SURGERY

## 2025-08-04 PROCEDURE — 99203 OFFICE O/P NEW LOW 30 MIN: CPT | Mod: 25,S$GLB,, | Performed by: ORTHOPAEDIC SURGERY

## 2025-08-04 PROCEDURE — 73562 X-RAY EXAM OF KNEE 3: CPT | Mod: TC,50,PO

## 2025-08-04 PROCEDURE — 97760 ORTHOTIC MGMT&TRAING 1ST ENC: CPT | Mod: GP,S$GLB,97, | Performed by: ORTHOPAEDIC SURGERY

## 2025-08-04 PROCEDURE — 1159F MED LIST DOCD IN RCRD: CPT | Mod: CPTII,S$GLB,, | Performed by: ORTHOPAEDIC SURGERY

## 2025-08-04 PROCEDURE — 3044F HG A1C LEVEL LT 7.0%: CPT | Mod: CPTII,S$GLB,, | Performed by: ORTHOPAEDIC SURGERY

## 2025-08-04 PROCEDURE — 99999 PR PBB SHADOW E&M-EST. PATIENT-LVL III: CPT | Mod: PBBFAC,,, | Performed by: ORTHOPAEDIC SURGERY

## 2025-08-04 NOTE — PROGRESS NOTES
70 years old bilateral knee pain for about 10 years time stabbing throbbing pain 8 on the pain scale worse activity relieved with rest.  Topicals provide partial relief     Exam shows tenderness the joint line without signs of infection    X-rays show degenerative type changes     Assessment: Bilateral knee arthrosis chronic pain     Plan:  Bilateral knee braces, encourage strengthening and weight loss over time, follow-up as needed      We performed a custom orthotic/brace fitting, adjusting and training with the patient. The patient demonstrated understanding and proper care. This was performed for 15 minutes.

## 2025-08-05 ENCOUNTER — OFFICE VISIT (OUTPATIENT)
Dept: ORTHOPEDICS | Facility: CLINIC | Age: 70
End: 2025-08-05
Payer: MEDICARE

## 2025-08-05 ENCOUNTER — HOSPITAL ENCOUNTER (OUTPATIENT)
Dept: RADIOLOGY | Facility: HOSPITAL | Age: 70
Discharge: HOME | End: 2025-08-05
Attending: PHYSICIAN ASSISTANT
Payer: MEDICARE

## 2025-08-05 VITALS — HEIGHT: 70 IN | WEIGHT: 287.94 LBS | BODY MASS INDEX: 41.22 KG/M2

## 2025-08-05 DIAGNOSIS — Z98.890 STATUS POST SURGERY: Primary | ICD-10-CM

## 2025-08-05 DIAGNOSIS — M25.531 RIGHT WRIST PAIN: ICD-10-CM

## 2025-08-05 PROCEDURE — 73110 X-RAY EXAM OF WRIST: CPT | Mod: 26,RT,, | Performed by: RADIOLOGY

## 2025-08-05 PROCEDURE — 73110 X-RAY EXAM OF WRIST: CPT | Mod: TC,PO,RT

## 2025-08-05 PROCEDURE — 99999 PR PBB SHADOW E&M-EST. PATIENT-LVL III: CPT | Mod: PBBFAC,,, | Performed by: PHYSICIAN ASSISTANT

## 2025-08-05 NOTE — PROGRESS NOTES
Bossman Duke is a 70 y.o. male who presents to clinic for follow up status post right distal radius fracture ORIF and right distal ulna shaft fracture ORIF.  He is about 10 weeks status post surgery.  States overall he is doing very well.  States he has perform the range-of-motion and stretching exercises at home on his own.  States pain on average is 0/10.  Denies acute injuries since his last visit.  Denies any other complaints at this time.    Physical Exam:  Examination of the right wrist reveals minimal edema.  No erythema, ecchymosis, or skin breakdown.  Able to flex extend the wrist appropriately.  Mildly reduced range of motion regards to flexion/extension of the right wrist.  5/5 /intrinsic strength.  Normal sensation in the radial, ulnar, median nerve distributions.  Capillary refill less than 2 seconds.    Radiology:  X-rays of the right wrist were taken today in clinic.  X-rays reviewed by myself.  X-rays compared to previous imaging.  Imaging showed the presence of distal radius and distal ulna fractures that has been stabilized via plate and screw fixation with routine healing.  Progressive interval healing noted compared to previous imaging.  No hardware complications noted.  No other significant bony abnormalities noted.    Assessment:  Status post right distal radius and distal ulna fractures ORIFs    Plan:  1. I discussed with Bossman Duke that he has progressed very well in the postoperative course.  Discussed the best course of action this time is to wear the splint for heavier activity only for the next 2 weeks, at which time we can discontinue the splint return to normal activity as tolerated.  He verbally agreed with the treatment plan.      2. I would like him follow up in clinic on a PRN basis.  He was instructed to contact the clinic for any problems or concerns in the interim.

## 2025-08-07 ENCOUNTER — LAB VISIT (OUTPATIENT)
Dept: LAB | Facility: HOSPITAL | Age: 70
End: 2025-08-07
Attending: NURSE PRACTITIONER
Payer: MEDICARE

## 2025-08-07 ENCOUNTER — OFFICE VISIT (OUTPATIENT)
Dept: GASTROENTEROLOGY | Facility: CLINIC | Age: 70
End: 2025-08-07
Payer: MEDICARE

## 2025-08-07 VITALS — HEIGHT: 70 IN | WEIGHT: 286.81 LBS | BODY MASS INDEX: 41.06 KG/M2

## 2025-08-07 DIAGNOSIS — R79.89 ELEVATED TSH: ICD-10-CM

## 2025-08-07 DIAGNOSIS — R14.0 BLOATING SYMPTOM: ICD-10-CM

## 2025-08-07 DIAGNOSIS — K59.09 CHRONIC CONSTIPATION: Primary | ICD-10-CM

## 2025-08-07 DIAGNOSIS — E78.5 HYPERLIPIDEMIA, UNSPECIFIED HYPERLIPIDEMIA TYPE: ICD-10-CM

## 2025-08-07 DIAGNOSIS — E11.65 TYPE 2 DIABETES MELLITUS WITH HYPERGLYCEMIA, WITHOUT LONG-TERM CURRENT USE OF INSULIN: ICD-10-CM

## 2025-08-07 DIAGNOSIS — K59.09 CHRONIC CONSTIPATION: ICD-10-CM

## 2025-08-07 DIAGNOSIS — Z79.02 ANTIPLATELET OR ANTITHROMBOTIC LONG-TERM USE: ICD-10-CM

## 2025-08-07 LAB
CHOLEST SERPL-MCNC: 145 MG/DL (ref 120–199)
CHOLEST/HDLC SERPL: 3.3 {RATIO} (ref 2–5)
HDLC SERPL-MCNC: 44 MG/DL (ref 40–75)
HDLC SERPL: 30.3 % (ref 20–50)
LDLC SERPL CALC-MCNC: 79.2 MG/DL (ref 63–159)
NONHDLC SERPL-MCNC: 101 MG/DL
TRIGL SERPL-MCNC: 109 MG/DL (ref 30–150)
TSH SERPL-ACNC: 3.4 UIU/ML (ref 0.4–4)

## 2025-08-07 PROCEDURE — 1101F PT FALLS ASSESS-DOCD LE1/YR: CPT | Mod: CPTII,S$GLB,, | Performed by: NURSE PRACTITIONER

## 2025-08-07 PROCEDURE — 3008F BODY MASS INDEX DOCD: CPT | Mod: CPTII,S$GLB,, | Performed by: NURSE PRACTITIONER

## 2025-08-07 PROCEDURE — 99214 OFFICE O/P EST MOD 30 MIN: CPT | Mod: S$GLB,,, | Performed by: NURSE PRACTITIONER

## 2025-08-07 PROCEDURE — 3044F HG A1C LEVEL LT 7.0%: CPT | Mod: CPTII,S$GLB,, | Performed by: NURSE PRACTITIONER

## 2025-08-07 PROCEDURE — 1159F MED LIST DOCD IN RCRD: CPT | Mod: CPTII,S$GLB,, | Performed by: NURSE PRACTITIONER

## 2025-08-07 PROCEDURE — 36415 COLL VENOUS BLD VENIPUNCTURE: CPT | Mod: PO

## 2025-08-07 PROCEDURE — 80061 LIPID PANEL: CPT

## 2025-08-07 PROCEDURE — 99999 PR PBB SHADOW E&M-EST. PATIENT-LVL III: CPT | Mod: PBBFAC,,, | Performed by: NURSE PRACTITIONER

## 2025-08-07 PROCEDURE — 84443 ASSAY THYROID STIM HORMONE: CPT

## 2025-08-07 PROCEDURE — 3288F FALL RISK ASSESSMENT DOCD: CPT | Mod: CPTII,S$GLB,, | Performed by: NURSE PRACTITIONER

## 2025-08-07 PROCEDURE — 1160F RVW MEDS BY RX/DR IN RCRD: CPT | Mod: CPTII,S$GLB,, | Performed by: NURSE PRACTITIONER

## 2025-08-07 PROCEDURE — 1126F AMNT PAIN NOTED NONE PRSNT: CPT | Mod: CPTII,S$GLB,, | Performed by: NURSE PRACTITIONER

## 2025-09-03 ENCOUNTER — TELEPHONE (OUTPATIENT)
Dept: GASTROENTEROLOGY | Facility: CLINIC | Age: 70
End: 2025-09-03
Payer: MEDICARE

## (undated) DEVICE — DRAPE U SPLIT SHEET 54X76IN

## (undated) DEVICE — SUT VICRYL 3-0 27 SH

## (undated) DEVICE — DRAPE THREE-QTR REINF 53X77IN

## (undated) DEVICE — SPLINT PLASTER FAST SET 5X30IN

## (undated) DEVICE — HANDLE SURG LIGHT NONRIGID

## (undated) DEVICE — NDL SPINAL 25GX3.5 SPINOCAN

## (undated) DEVICE — BLADE #15 STERILE CARBON

## (undated) DEVICE — TRAY NERVE BLOCK

## (undated) DEVICE — DRAPE C ARM 42 X 120 10/BX

## (undated) DEVICE — BANDAGE ELAS SOFTWRAP ST 4X5YD

## (undated) DEVICE — CORD BIPOLAR 12 FOOT

## (undated) DEVICE — TOWEL OR DISP STRL BLUE 4/PK

## (undated) DEVICE — GLOVE BIOGEL PI MICRO SZ 8

## (undated) DEVICE — SPONGE COTTON TRAY 4X4IN

## (undated) DEVICE — APPLICATOR CHLORAPREP ORN 26ML

## (undated) DEVICE — APPLICATOR CHLORAPREP CLR 10.5

## (undated) DEVICE — ALCOHOL 70% ISOP RUBBING 4OZ

## (undated) DEVICE — GLOVE PROTEXIS LTX MICRO  7.5

## (undated) DEVICE — BIT DRILL QC 1.8X110MM

## (undated) DEVICE — TOURNIQUET SB QC DP 18X4IN

## (undated) DEVICE — DRESSING XEROFORM NONADH 1X8IN

## (undated) DEVICE — GLOVE 7.5 PROTEXIS PI MICRO

## (undated) DEVICE — DRAPE HALF SURGICAL 40X58IN

## (undated) DEVICE — GOWN X-LG STERILE BACK

## (undated) DEVICE — FORCEP STRAIGHT DISP

## (undated) DEVICE — PAD CAST SPECIALIST STRL 4

## (undated) DEVICE — DRAPE STERI-DRAPE 1000 17X11IN

## (undated) DEVICE — Device

## (undated) DEVICE — BANDAGE ESMARK LATEX FREE 4INX

## (undated) DEVICE — CANNULA VENOM 20G 10X100MM

## (undated) DEVICE — SEE MEDLINE ITEM 152622

## (undated) DEVICE — DRAPE HAND STERILE

## (undated) DEVICE — KIT SAHARA DRAPE DRAW/LIFT

## (undated) DEVICE — BANDAGE ELASTIC 3X5 VELCRO ST

## (undated) DEVICE — SCREW STRDRV REC T8 2.4X30 SS
Type: IMPLANTABLE DEVICE | Site: WRIST | Status: NON-FUNCTIONAL
Removed: 2025-05-27

## (undated) DEVICE — BIT DRILL QC 2X100MM SS STRL

## (undated) DEVICE — GLOVE SENSICARE PI ALOE 8

## (undated) DEVICE — PADDING CAST 3 X 4YD

## (undated) DEVICE — STRAP OR TABLE 5IN X 72IN

## (undated) DEVICE — GLOVE PROTEXIS LTX MICRO 8

## (undated) DEVICE — DRESSING GAUZE XEROFORM 5X9

## (undated) DEVICE — BIT DRILL CALIB 30MM 2.0X110MM

## (undated) DEVICE — SOL IRR 0.9% NACL 500ML PB

## (undated) DEVICE — PAD ELECTROSURGICAL PAT PLATE

## (undated) DEVICE — SUT ETHILON 3-0 PS2 18 BLK

## (undated) DEVICE — SOL SOD CHLORIDE 0.9% 10ML

## (undated) DEVICE — BOWL STERILE LARGE 32OZ